# Patient Record
Sex: FEMALE | Race: WHITE | NOT HISPANIC OR LATINO | Employment: PART TIME | URBAN - METROPOLITAN AREA
[De-identification: names, ages, dates, MRNs, and addresses within clinical notes are randomized per-mention and may not be internally consistent; named-entity substitution may affect disease eponyms.]

---

## 2018-01-22 ENCOUNTER — OFFICE VISIT CONVERTED (OUTPATIENT)
Dept: CARDIOLOGY | Facility: CLINIC | Age: 73
End: 2018-01-22
Attending: INTERNAL MEDICINE

## 2018-01-22 ENCOUNTER — CONVERSION ENCOUNTER (OUTPATIENT)
Dept: CARDIOLOGY | Facility: CLINIC | Age: 73
End: 2018-01-22

## 2018-07-25 ENCOUNTER — OFFICE VISIT CONVERTED (OUTPATIENT)
Dept: CARDIOLOGY | Facility: CLINIC | Age: 73
End: 2018-07-25
Attending: INTERNAL MEDICINE

## 2019-02-21 ENCOUNTER — HOSPITAL ENCOUNTER (OUTPATIENT)
Dept: OTHER | Facility: HOSPITAL | Age: 74
Discharge: HOME | End: 2019-02-21
Attending: INTERNAL MEDICINE

## 2019-02-21 LAB — ERYTHROCYTE [SEDIMENTATION RATE] IN BLOOD: 6 MM/H (ref 0–30)

## 2019-02-24 LAB
C3 SERPL-MCNC: 153 MG/DL (ref 88–201)
C4 SERPL-MCNC: 24 MG/DL (ref 10–40)
CONV ANA IGG BY ELISA: NORMAL
RHEUMATOID FACT SERPL-ACNC: <10 IU/ML (ref 0–14)

## 2019-05-08 ENCOUNTER — OFFICE VISIT CONVERTED (OUTPATIENT)
Dept: CARDIOLOGY | Facility: CLINIC | Age: 74
End: 2019-05-08
Attending: INTERNAL MEDICINE

## 2019-05-22 ENCOUNTER — CONVERSION ENCOUNTER (OUTPATIENT)
Dept: CARDIOLOGY | Facility: CLINIC | Age: 74
End: 2019-05-22
Attending: INTERNAL MEDICINE

## 2019-11-11 ENCOUNTER — OFFICE VISIT CONVERTED (OUTPATIENT)
Dept: CARDIOLOGY | Facility: CLINIC | Age: 74
End: 2019-11-11
Attending: NURSE PRACTITIONER

## 2019-11-11 ENCOUNTER — CONVERSION ENCOUNTER (OUTPATIENT)
Dept: CARDIOLOGY | Facility: CLINIC | Age: 74
End: 2019-11-11

## 2020-04-06 ENCOUNTER — HOSPITAL ENCOUNTER (OUTPATIENT)
Dept: ULTRASOUND IMAGING | Facility: HOSPITAL | Age: 75
Discharge: HOME | End: 2020-04-06
Attending: NURSE PRACTITIONER

## 2020-04-09 ENCOUNTER — OFFICE VISIT CONVERTED (OUTPATIENT)
Dept: ONCOLOGY | Facility: HOSPITAL | Age: 75
End: 2020-04-09
Attending: INTERNAL MEDICINE

## 2020-04-09 ENCOUNTER — HOSPITAL ENCOUNTER (OUTPATIENT)
Dept: ONCOLOGY | Facility: HOSPITAL | Age: 75
Discharge: HOME | End: 2020-04-09
Attending: INTERNAL MEDICINE

## 2020-04-09 ENCOUNTER — OFFICE VISIT CONVERTED (OUTPATIENT)
Dept: OTHER | Facility: HOSPITAL | Age: 75
End: 2020-04-09
Attending: SURGERY

## 2020-04-13 LAB — ERBB2 GENE DP TISS QL FISH: NORMAL

## 2020-04-15 ENCOUNTER — HOSPITAL ENCOUNTER (OUTPATIENT)
Dept: MRI IMAGING | Facility: HOSPITAL | Age: 75
Discharge: HOME | End: 2020-04-15
Attending: SURGERY

## 2020-04-15 LAB
CREAT BLD-MCNC: 0.7 MG/DL (ref 0.6–1.4)
GFR SERPLBLD BASED ON 1.73 SQ M-ARVRAT: >60 ML/MIN/{1.73_M2}

## 2020-04-16 ENCOUNTER — OFFICE VISIT CONVERTED (OUTPATIENT)
Dept: ONCOLOGY | Facility: HOSPITAL | Age: 75
End: 2020-04-16
Attending: INTERNAL MEDICINE

## 2020-04-21 ENCOUNTER — HOSPITAL ENCOUNTER (OUTPATIENT)
Dept: PERIOP | Facility: HOSPITAL | Age: 75
Setting detail: HOSPITAL OUTPATIENT SURGERY
Discharge: HOME | End: 2020-04-21
Attending: SURGERY

## 2020-04-29 ENCOUNTER — OFFICE VISIT CONVERTED (OUTPATIENT)
Dept: ONCOLOGY | Facility: HOSPITAL | Age: 75
End: 2020-04-29
Attending: NURSE PRACTITIONER

## 2020-04-30 ENCOUNTER — OFFICE VISIT CONVERTED (OUTPATIENT)
Dept: ONCOLOGY | Facility: HOSPITAL | Age: 75
End: 2020-04-30
Attending: INTERNAL MEDICINE

## 2020-04-30 ENCOUNTER — HOSPITAL ENCOUNTER (OUTPATIENT)
Dept: ONCOLOGY | Facility: HOSPITAL | Age: 75
Discharge: HOME | End: 2020-04-30
Attending: INTERNAL MEDICINE

## 2020-04-30 ENCOUNTER — OFFICE VISIT CONVERTED (OUTPATIENT)
Dept: SURGERY | Facility: CLINIC | Age: 75
End: 2020-04-30
Attending: SURGERY

## 2020-04-30 ENCOUNTER — CONVERSION ENCOUNTER (OUTPATIENT)
Dept: SURGERY | Facility: CLINIC | Age: 75
End: 2020-04-30

## 2020-04-30 LAB
ALBUMIN SERPL-MCNC: 4 G/DL (ref 3.5–5)
ALBUMIN/GLOB SERPL: 1.4 {RATIO} (ref 1.4–2.6)
ALP SERPL-CCNC: 59 U/L (ref 43–160)
ALT SERPL-CCNC: 13 U/L (ref 10–40)
ANION GAP SERPL CALC-SCNC: 10 MMOL/L (ref 8–19)
AST SERPL-CCNC: 18 U/L (ref 15–50)
BASOPHILS # BLD AUTO: 0.03 10*3/UL (ref 0–0.2)
BASOPHILS NFR BLD AUTO: 0.6 % (ref 0–3)
BILIRUB SERPL-MCNC: 0.43 MG/DL (ref 0.2–1.3)
BUN SERPL-MCNC: 17 MG/DL (ref 5–25)
BUN/CREAT SERPL: 27 {RATIO} (ref 6–20)
CALCIUM SERPL-MCNC: 10.1 MG/DL (ref 8.7–10.4)
CALCIUM SPEC-SCNC: 10.1 MG/DL (ref 8.7–10.4)
CHLORIDE SERPL-SCNC: 102 MMOL/L (ref 99–111)
CONV ABS IMM GRAN: 0.01 10*3/UL (ref 0–0.54)
CONV CO2: 30 MMOL/L (ref 22–32)
CONV EOSINOPHILS PERCENT BY MANUAL COUNT: 2.4 % (ref 0–7)
CONV IMMATURE GRAN: 0.2 % (ref 0–0.4)
CONV TOTAL PROTEIN: 6.8 G/DL (ref 6.3–8.2)
CREAT UR-MCNC: 0.64 MG/DL (ref 0.5–0.9)
EOSINOPHIL # BLD MANUAL: 0.12 10*3/UL (ref 0–0.7)
ERYTHROCYTE [DISTWIDTH] IN BLOOD BY AUTOMATED COUNT: 12.4 % (ref 11.5–14.5)
ERYTHROCYTE [DISTWIDTH] IN BLOOD BY AUTOMATED COUNT: 44.1 FL
GFR SERPLBLD BASED ON 1.73 SQ M-ARVRAT: >60 ML/MIN/{1.73_M2}
GLOBULIN UR ELPH-MCNC: 2.8 G/DL (ref 2–3.5)
GLUCOSE SERPL-MCNC: 99 MG/DL (ref 65–99)
HBA1C MFR BLD: 13.2 G/DL (ref 12–16)
HCT VFR BLD AUTO: 40.5 % (ref 37–47)
LYMPHOCYTES # BLD AUTO: 1.45 10*3/UL (ref 1–5)
LYMPHOCYTES NFR BLD AUTO: 28.9 % (ref 20–45)
MCH RBC QN AUTO: 31.2 PG (ref 27–31)
MCHC RBC AUTO-ENTMCNC: 32.6 G/DL (ref 33–37)
MCV RBC AUTO: 95.7 FL (ref 81–99)
MONOCYTES # BLD AUTO: 0.44 10*3/UL (ref 0.2–1.2)
MONOCYTES NFR BLD MANUAL: 8.8 % (ref 3–10)
NEUTROPHILS # BLD AUTO: 2.97 10*3/UL (ref 2–8)
NEUTROPHILS NFR BLD MANUAL: 59.1 % (ref 30–85)
OSMOLALITY SERPL CALC.SUM OF ELEC: 288 MOSM/KG (ref 273–304)
PLATELET # BLD AUTO: 223 10*3/UL (ref 130–400)
PMV BLD AUTO: 9.3 FL (ref 7.4–10.4)
POTASSIUM SERPL-SCNC: 3.9 MMOL/L (ref 3.5–5.3)
RBC MORPH BLD: 4.23 10*6/UL (ref 4.2–5.4)
SODIUM SERPL-SCNC: 138 MMOL/L (ref 135–147)
WBC # BLD AUTO: 5.02 10*3/UL (ref 4.8–10.8)

## 2020-05-01 ENCOUNTER — HOSPITAL ENCOUNTER (OUTPATIENT)
Dept: OTHER | Facility: HOSPITAL | Age: 75
Setting detail: RECURRING SERIES
Discharge: STILL ADMITTED OR ADMINISTRATIVE | End: 2020-07-10
Attending: INTERNAL MEDICINE

## 2020-05-18 ENCOUNTER — HOSPITAL ENCOUNTER (OUTPATIENT)
Dept: CARDIOLOGY | Facility: HOSPITAL | Age: 75
Discharge: HOME | End: 2020-05-18
Attending: INTERNAL MEDICINE

## 2020-05-22 ENCOUNTER — OFFICE VISIT CONVERTED (OUTPATIENT)
Dept: ONCOLOGY | Facility: HOSPITAL | Age: 75
End: 2020-05-22
Attending: INTERNAL MEDICINE

## 2020-05-22 LAB
ALBUMIN SERPL-MCNC: 3.7 G/DL (ref 3.5–5)
ALBUMIN/GLOB SERPL: 1.7 {RATIO} (ref 1.4–2.6)
ALP SERPL-CCNC: 54 U/L (ref 43–160)
ALT SERPL-CCNC: 28 U/L (ref 10–40)
ANION GAP SERPL CALC-SCNC: 10 MMOL/L (ref 8–19)
AST SERPL-CCNC: 23 U/L (ref 15–50)
BASOPHILS # BLD AUTO: 0.05 10*3/UL (ref 0–0.2)
BASOPHILS NFR BLD AUTO: 1.1 % (ref 0–3)
BILIRUB SERPL-MCNC: 0.52 MG/DL (ref 0.2–1.3)
BUN SERPL-MCNC: 13 MG/DL (ref 5–25)
BUN/CREAT SERPL: 20 {RATIO} (ref 6–20)
CALCIUM SERPL-MCNC: 9.9 MG/DL (ref 8.7–10.4)
CALCIUM SPEC-SCNC: 9.7 MG/DL (ref 8.7–10.4)
CHLORIDE SERPL-SCNC: 100 MMOL/L (ref 99–111)
CONV ABS IMM GRAN: 0.02 10*3/UL (ref 0–0.54)
CONV CO2: 30 MMOL/L (ref 22–32)
CONV EOSINOPHILS PERCENT BY MANUAL COUNT: 1.5 % (ref 0–7)
CONV IMMATURE GRAN: 0.4 % (ref 0–0.4)
CONV TOTAL PROTEIN: 5.9 G/DL (ref 6.3–8.2)
CREAT UR-MCNC: 0.65 MG/DL (ref 0.5–0.9)
EOSINOPHIL # BLD MANUAL: 0.07 10*3/UL (ref 0–0.7)
ERYTHROCYTE [DISTWIDTH] IN BLOOD BY AUTOMATED COUNT: 13.9 % (ref 11.5–14.5)
ERYTHROCYTE [DISTWIDTH] IN BLOOD BY AUTOMATED COUNT: 47.6 FL
GFR SERPLBLD BASED ON 1.73 SQ M-ARVRAT: >60 ML/MIN/{1.73_M2}
GLOBULIN UR ELPH-MCNC: 2.2 G/DL (ref 2–3.5)
GLUCOSE SERPL-MCNC: 101 MG/DL (ref 65–99)
HBA1C MFR BLD: 10.8 G/DL (ref 12–16)
HCT VFR BLD AUTO: 32.7 % (ref 37–47)
LYMPHOCYTES # BLD AUTO: 0.94 10*3/UL (ref 1–5)
LYMPHOCYTES NFR BLD AUTO: 20.6 % (ref 20–45)
MCH RBC QN AUTO: 31.7 PG (ref 27–31)
MCHC RBC AUTO-ENTMCNC: 33 G/DL (ref 33–37)
MCV RBC AUTO: 95.9 FL (ref 81–99)
MONOCYTES # BLD AUTO: 0.47 10*3/UL (ref 0.2–1.2)
MONOCYTES NFR BLD MANUAL: 10.3 % (ref 3–10)
NEUTROPHILS # BLD AUTO: 3.02 10*3/UL (ref 2–8)
NEUTROPHILS NFR BLD MANUAL: 66.1 % (ref 30–85)
OSMOLALITY SERPL CALC.SUM OF ELEC: 284 MOSM/KG (ref 273–304)
PLATELET # BLD AUTO: 245 10*3/UL (ref 130–400)
PMV BLD AUTO: 9.1 FL (ref 7.4–10.4)
POTASSIUM SERPL-SCNC: 3.4 MMOL/L (ref 3.5–5.3)
RBC MORPH BLD: 3.41 10*6/UL (ref 4.2–5.4)
SODIUM SERPL-SCNC: 137 MMOL/L (ref 135–147)
WBC # BLD AUTO: 4.57 10*3/UL (ref 4.8–10.8)

## 2020-05-27 ENCOUNTER — OFFICE VISIT CONVERTED (OUTPATIENT)
Dept: CARDIOLOGY | Facility: CLINIC | Age: 75
End: 2020-05-27
Attending: INTERNAL MEDICINE

## 2020-06-12 ENCOUNTER — OFFICE VISIT CONVERTED (OUTPATIENT)
Dept: ONCOLOGY | Facility: HOSPITAL | Age: 75
End: 2020-06-12
Attending: INTERNAL MEDICINE

## 2020-06-12 ENCOUNTER — HOSPITAL ENCOUNTER (OUTPATIENT)
Dept: OTHER | Facility: HOSPITAL | Age: 75
Discharge: HOME | End: 2020-06-12
Attending: INTERNAL MEDICINE

## 2020-06-12 LAB
ALBUMIN SERPL-MCNC: 3.9 G/DL (ref 3.5–5)
ALBUMIN/GLOB SERPL: 2 {RATIO} (ref 1.4–2.6)
ALP SERPL-CCNC: 59 U/L (ref 43–160)
ALT SERPL-CCNC: 23 U/L (ref 10–40)
ANION GAP SERPL CALC-SCNC: 8 MMOL/L (ref 8–19)
AST SERPL-CCNC: 23 U/L (ref 15–50)
BASOPHILS # BLD AUTO: 0.03 10*3/UL (ref 0–0.2)
BASOPHILS NFR BLD AUTO: 0.6 % (ref 0–3)
BILIRUB SERPL-MCNC: 0.55 MG/DL (ref 0.2–1.3)
BNP SERPL-MCNC: 703 PG/ML (ref 0–900)
BUN SERPL-MCNC: 17 MG/DL (ref 5–25)
BUN/CREAT SERPL: 27 {RATIO} (ref 6–20)
CALCIUM SERPL-MCNC: 9.9 MG/DL (ref 8.7–10.4)
CALCIUM SPEC-SCNC: 9.8 MG/DL (ref 8.7–10.4)
CHLORIDE SERPL-SCNC: 99 MMOL/L (ref 99–111)
CONV ABS IMM GRAN: 0.01 10*3/UL (ref 0–0.54)
CONV CO2: 29 MMOL/L (ref 22–32)
CONV EOSINOPHILS PERCENT BY MANUAL COUNT: 0.2 % (ref 0–7)
CONV IMMATURE GRAN: 0.2 % (ref 0–0.4)
CONV TOTAL PROTEIN: 5.9 G/DL (ref 6.3–8.2)
CREAT UR-MCNC: 0.62 MG/DL (ref 0.5–0.9)
EOSINOPHIL # BLD MANUAL: 0.01 10*3/UL (ref 0–0.7)
ERYTHROCYTE [DISTWIDTH] IN BLOOD BY AUTOMATED COUNT: 15.8 % (ref 11.5–14.5)
ERYTHROCYTE [DISTWIDTH] IN BLOOD BY AUTOMATED COUNT: 56.4 FL
GFR SERPLBLD BASED ON 1.73 SQ M-ARVRAT: >60 ML/MIN/{1.73_M2}
GLOBULIN UR ELPH-MCNC: 2 G/DL (ref 2–3.5)
GLUCOSE SERPL-MCNC: 105 MG/DL (ref 65–99)
HBA1C MFR BLD: 10.1 G/DL (ref 12–16)
HCT VFR BLD AUTO: 31 % (ref 37–47)
LYMPHOCYTES # BLD AUTO: 0.87 10*3/UL (ref 1–5)
LYMPHOCYTES NFR BLD AUTO: 18.4 % (ref 20–45)
MCH RBC QN AUTO: 32.2 PG (ref 27–31)
MCHC RBC AUTO-ENTMCNC: 32.6 G/DL (ref 33–37)
MCV RBC AUTO: 98.7 FL (ref 81–99)
MONOCYTES # BLD AUTO: 0.61 10*3/UL (ref 0.2–1.2)
MONOCYTES NFR BLD MANUAL: 12.9 % (ref 3–10)
NEUTROPHILS # BLD AUTO: 3.21 10*3/UL (ref 2–8)
NEUTROPHILS NFR BLD MANUAL: 67.7 % (ref 30–85)
OSMOLALITY SERPL CALC.SUM OF ELEC: 278 MOSM/KG (ref 273–304)
PLATELET # BLD AUTO: 209 10*3/UL (ref 130–400)
PMV BLD AUTO: 9.2 FL (ref 7.4–10.4)
POTASSIUM SERPL-SCNC: 3.2 MMOL/L (ref 3.5–5.3)
RBC MORPH BLD: 3.14 10*6/UL (ref 4.2–5.4)
SODIUM SERPL-SCNC: 133 MMOL/L (ref 135–147)
WBC # BLD AUTO: 4.74 10*3/UL (ref 4.8–10.8)

## 2020-07-02 ENCOUNTER — OFFICE VISIT CONVERTED (OUTPATIENT)
Dept: ONCOLOGY | Facility: HOSPITAL | Age: 75
End: 2020-07-02
Attending: INTERNAL MEDICINE

## 2020-07-02 LAB
ALBUMIN SERPL-MCNC: 3.7 G/DL (ref 3.5–5)
ALBUMIN/GLOB SERPL: 1.9 {RATIO} (ref 1.4–2.6)
ALP SERPL-CCNC: 62 U/L (ref 43–160)
ALT SERPL-CCNC: 20 U/L (ref 10–40)
ANION GAP SERPL CALC-SCNC: 10 MMOL/L (ref 8–19)
AST SERPL-CCNC: 23 U/L (ref 15–50)
BASOPHILS # BLD AUTO: 0.04 10*3/UL (ref 0–0.2)
BASOPHILS NFR BLD AUTO: 1 % (ref 0–3)
BILIRUB SERPL-MCNC: 0.53 MG/DL (ref 0.2–1.3)
BUN SERPL-MCNC: 13 MG/DL (ref 5–25)
BUN/CREAT SERPL: 25 {RATIO} (ref 6–20)
CALCIUM SERPL-MCNC: 9.8 MG/DL (ref 8.7–10.4)
CALCIUM SPEC-SCNC: 9.6 MG/DL (ref 8.7–10.4)
CHLORIDE SERPL-SCNC: 98 MMOL/L (ref 99–111)
CONV ABS IMM GRAN: 0.01 10*3/UL (ref 0–0.54)
CONV CO2: 27 MMOL/L (ref 22–32)
CONV EOSINOPHILS PERCENT BY MANUAL COUNT: 0 % (ref 0–7)
CONV IMMATURE GRAN: 0.2 % (ref 0–0.4)
CONV TOTAL PROTEIN: 5.7 G/DL (ref 6.3–8.2)
CREAT UR-MCNC: 0.53 MG/DL (ref 0.5–0.9)
EOSINOPHIL # BLD MANUAL: 0 10*3/UL (ref 0–0.7)
ERYTHROCYTE [DISTWIDTH] IN BLOOD BY AUTOMATED COUNT: 16.9 % (ref 11.5–14.5)
ERYTHROCYTE [DISTWIDTH] IN BLOOD BY AUTOMATED COUNT: 61.2 FL
GFR SERPLBLD BASED ON 1.73 SQ M-ARVRAT: >60 ML/MIN/{1.73_M2}
GLOBULIN UR ELPH-MCNC: 2 G/DL (ref 2–3.5)
GLUCOSE SERPL-MCNC: 102 MG/DL (ref 65–99)
HBA1C MFR BLD: 9.4 G/DL (ref 12–16)
HCT VFR BLD AUTO: 29.1 % (ref 37–47)
LYMPHOCYTES # BLD AUTO: 1.01 10*3/UL (ref 1–5)
LYMPHOCYTES NFR BLD AUTO: 24 % (ref 20–45)
MCH RBC QN AUTO: 32.3 PG (ref 27–31)
MCHC RBC AUTO-ENTMCNC: 32.3 G/DL (ref 33–37)
MCV RBC AUTO: 100 FL (ref 81–99)
MONOCYTES # BLD AUTO: 0.59 10*3/UL (ref 0.2–1.2)
MONOCYTES NFR BLD MANUAL: 14 % (ref 3–10)
NEUTROPHILS # BLD AUTO: 2.56 10*3/UL (ref 2–8)
NEUTROPHILS NFR BLD MANUAL: 60.8 % (ref 30–85)
OSMOLALITY SERPL CALC.SUM OF ELEC: 274 MOSM/KG (ref 273–304)
PLATELET # BLD AUTO: 195 10*3/UL (ref 130–400)
PMV BLD AUTO: 9.3 FL (ref 7.4–10.4)
POTASSIUM SERPL-SCNC: 3.4 MMOL/L (ref 3.5–5.3)
RBC MORPH BLD: 2.91 10*6/UL (ref 4.2–5.4)
SODIUM SERPL-SCNC: 132 MMOL/L (ref 135–147)
WBC # BLD AUTO: 4.21 10*3/UL (ref 4.8–10.8)

## 2020-07-24 ENCOUNTER — HOSPITAL ENCOUNTER (OUTPATIENT)
Dept: OTHER | Facility: HOSPITAL | Age: 75
Discharge: HOME | End: 2020-07-24
Attending: INTERNAL MEDICINE

## 2020-07-24 ENCOUNTER — HOSPITAL ENCOUNTER (OUTPATIENT)
Dept: OTHER | Facility: HOSPITAL | Age: 75
Setting detail: RECURRING SERIES
Discharge: STILL ADMITTED OR ADMINISTRATIVE | End: 2020-07-28
Attending: INTERNAL MEDICINE

## 2020-07-24 ENCOUNTER — OFFICE VISIT CONVERTED (OUTPATIENT)
Dept: ONCOLOGY | Facility: HOSPITAL | Age: 75
End: 2020-07-24
Attending: INTERNAL MEDICINE

## 2020-07-24 LAB
ALBUMIN SERPL-MCNC: 3.5 G/DL (ref 3.5–5)
ALBUMIN/GLOB SERPL: 2.2 {RATIO} (ref 1.4–2.6)
ALP SERPL-CCNC: 62 U/L (ref 43–160)
ALT SERPL-CCNC: 17 U/L (ref 10–40)
ANION GAP SERPL CALC-SCNC: 12 MMOL/L (ref 8–19)
AST SERPL-CCNC: 23 U/L (ref 15–50)
BASOPHILS # BLD AUTO: 0.05 10*3/UL (ref 0–0.2)
BASOPHILS NFR BLD AUTO: 1.2 % (ref 0–3)
BILIRUB SERPL-MCNC: 0.45 MG/DL (ref 0.2–1.3)
BUN SERPL-MCNC: 28 MG/DL (ref 5–25)
BUN/CREAT SERPL: 30 {RATIO} (ref 6–20)
CALCIUM SERPL-MCNC: 9.8 MG/DL (ref 8.7–10.4)
CHLORIDE SERPL-SCNC: 97 MMOL/L (ref 99–111)
CONV ABS IMM GRAN: 0.01 10*3/UL (ref 0–0.54)
CONV CO2: 26 MMOL/L (ref 22–32)
CONV EOSINOPHILS PERCENT BY MANUAL COUNT: 0.2 % (ref 0–7)
CONV IMMATURE GRAN: 0.2 % (ref 0–0.4)
CONV TOTAL PROTEIN: 5.1 G/DL (ref 6.3–8.2)
CREAT UR-MCNC: 0.93 MG/DL (ref 0.5–0.9)
EOSINOPHIL # BLD MANUAL: 0.01 10*3/UL (ref 0–0.7)
ERYTHROCYTE [DISTWIDTH] IN BLOOD BY AUTOMATED COUNT: 17.6 % (ref 11.5–14.5)
ERYTHROCYTE [DISTWIDTH] IN BLOOD BY AUTOMATED COUNT: 66.1 FL
GFR SERPLBLD BASED ON 1.73 SQ M-ARVRAT: >60 ML/MIN/{1.73_M2}
GLOBULIN UR ELPH-MCNC: 1.6 G/DL (ref 2–3.5)
GLUCOSE SERPL-MCNC: 102 MG/DL (ref 65–99)
HBA1C MFR BLD: 9.2 G/DL (ref 12–16)
HCT VFR BLD AUTO: 29.3 % (ref 37–47)
LYMPHOCYTES # BLD AUTO: 1.22 10*3/UL (ref 1–5)
LYMPHOCYTES NFR BLD AUTO: 28.2 % (ref 20–45)
MCH RBC QN AUTO: 32.1 PG (ref 27–31)
MCHC RBC AUTO-ENTMCNC: 31.4 G/DL (ref 33–37)
MCV RBC AUTO: 102.1 FL (ref 81–99)
MONOCYTES # BLD AUTO: 0.55 10*3/UL (ref 0.2–1.2)
MONOCYTES NFR BLD MANUAL: 12.7 % (ref 3–10)
NEUTROPHILS # BLD AUTO: 2.49 10*3/UL (ref 2–8)
NEUTROPHILS NFR BLD MANUAL: 57.5 % (ref 30–85)
OSMOLALITY SERPL CALC.SUM OF ELEC: 278 MOSM/KG (ref 273–304)
PLATELET # BLD AUTO: 185 10*3/UL (ref 130–400)
PMV BLD AUTO: 9.7 FL (ref 7.4–10.4)
POTASSIUM SERPL-SCNC: 4.1 MMOL/L (ref 3.5–5.3)
RBC MORPH BLD: 2.87 10*6/UL (ref 4.2–5.4)
SODIUM SERPL-SCNC: 131 MMOL/L (ref 135–147)
WBC # BLD AUTO: 4.33 10*3/UL (ref 4.8–10.8)

## 2020-07-29 ENCOUNTER — HOSPITAL ENCOUNTER (OUTPATIENT)
Dept: MRI IMAGING | Facility: HOSPITAL | Age: 75
Discharge: HOME | End: 2020-07-29
Attending: INTERNAL MEDICINE

## 2020-07-31 ENCOUNTER — HOSPITAL ENCOUNTER (OUTPATIENT)
Dept: OTHER | Facility: HOSPITAL | Age: 75
Discharge: HOME | End: 2020-07-31
Attending: INTERNAL MEDICINE

## 2020-07-31 ENCOUNTER — OFFICE VISIT CONVERTED (OUTPATIENT)
Dept: ONCOLOGY | Facility: HOSPITAL | Age: 75
End: 2020-07-31
Attending: INTERNAL MEDICINE

## 2020-08-05 ENCOUNTER — OFFICE VISIT CONVERTED (OUTPATIENT)
Dept: CARDIOLOGY | Facility: CLINIC | Age: 75
End: 2020-08-05
Attending: INTERNAL MEDICINE

## 2020-08-13 ENCOUNTER — OFFICE VISIT CONVERTED (OUTPATIENT)
Dept: SURGERY | Facility: CLINIC | Age: 75
End: 2020-08-13
Attending: SURGERY

## 2020-08-13 ENCOUNTER — HOSPITAL ENCOUNTER (OUTPATIENT)
Dept: CARDIOLOGY | Facility: HOSPITAL | Age: 75
Discharge: HOME | End: 2020-08-13
Attending: INTERNAL MEDICINE

## 2020-08-13 ENCOUNTER — HOSPITAL ENCOUNTER (OUTPATIENT)
Dept: PREADMISSION TESTING | Facility: HOSPITAL | Age: 75
Discharge: HOME | End: 2020-08-13
Attending: SURGERY

## 2020-08-15 LAB — SARS-COV-2 RNA SPEC QL NAA+PROBE: NOT DETECTED

## 2020-08-18 ENCOUNTER — HOSPITAL ENCOUNTER (OUTPATIENT)
Dept: PERIOP | Facility: HOSPITAL | Age: 75
Setting detail: HOSPITAL OUTPATIENT SURGERY
Discharge: HOME | End: 2020-08-18
Attending: SURGERY

## 2020-08-26 ENCOUNTER — OFFICE VISIT CONVERTED (OUTPATIENT)
Dept: SURGERY | Facility: CLINIC | Age: 75
End: 2020-08-26
Attending: SURGERY

## 2020-09-08 ENCOUNTER — HOSPITAL ENCOUNTER (OUTPATIENT)
Dept: PHYSICAL THERAPY | Facility: CLINIC | Age: 75
Setting detail: RECURRING SERIES
Discharge: STILL ADMITTED OR ADMINISTRATIVE | End: 2021-01-10
Attending: SURGERY

## 2020-09-10 ENCOUNTER — HOSPITAL ENCOUNTER (OUTPATIENT)
Dept: RADIATION ONCOLOGY | Facility: HOSPITAL | Age: 75
Setting detail: RECURRING SERIES
Discharge: STILL ADMITTED OR ADMINISTRATIVE | End: 2020-12-02
Attending: RADIOLOGY

## 2020-09-24 ENCOUNTER — OFFICE VISIT CONVERTED (OUTPATIENT)
Dept: RADIATION ONCOLOGY | Facility: HOSPITAL | Age: 75
End: 2020-09-24
Attending: INTERNAL MEDICINE

## 2020-09-24 ENCOUNTER — HOSPITAL ENCOUNTER (OUTPATIENT)
Dept: OTHER | Facility: HOSPITAL | Age: 75
Discharge: HOME | End: 2020-09-24
Attending: INTERNAL MEDICINE

## 2020-09-28 ENCOUNTER — OFFICE VISIT CONVERTED (OUTPATIENT)
Dept: CARDIOLOGY | Facility: CLINIC | Age: 75
End: 2020-09-28
Attending: INTERNAL MEDICINE

## 2020-09-28 ENCOUNTER — CONVERSION ENCOUNTER (OUTPATIENT)
Dept: CARDIOLOGY | Facility: CLINIC | Age: 75
End: 2020-09-28

## 2020-09-29 ENCOUNTER — HOSPITAL ENCOUNTER (OUTPATIENT)
Dept: OTHER | Facility: HOSPITAL | Age: 75
Setting detail: RECURRING SERIES
Discharge: HOME | End: 2020-12-28
Attending: INTERNAL MEDICINE

## 2020-09-29 LAB
ALBUMIN SERPL-MCNC: 3.9 G/DL (ref 3.5–5)
ALBUMIN/GLOB SERPL: 1.9 {RATIO} (ref 1.4–2.6)
ALP SERPL-CCNC: 69 U/L (ref 43–160)
ALT SERPL-CCNC: 6 U/L (ref 10–40)
ANION GAP SERPL CALC-SCNC: 11 MMOL/L (ref 8–19)
AST SERPL-CCNC: 14 U/L (ref 15–50)
BASOPHILS # BLD AUTO: 0.02 10*3/UL (ref 0–0.2)
BASOPHILS NFR BLD AUTO: 0.7 % (ref 0–3)
BILIRUB SERPL-MCNC: 0.76 MG/DL (ref 0.2–1.3)
BUN SERPL-MCNC: 12 MG/DL (ref 5–25)
BUN/CREAT SERPL: 18 {RATIO} (ref 6–20)
CALCIUM SERPL-MCNC: 9.8 MG/DL (ref 8.7–10.4)
CHLORIDE SERPL-SCNC: 103 MMOL/L (ref 99–111)
CONV ABS IMM GRAN: 0.01 10*3/UL (ref 0–0.54)
CONV CO2: 29 MMOL/L (ref 22–32)
CONV EOSINOPHILS PERCENT BY MANUAL COUNT: 3.9 % (ref 0–7)
CONV IMMATURE GRAN: 0.3 % (ref 0–0.4)
CONV TOTAL PROTEIN: 6 G/DL (ref 6.3–8.2)
CREAT UR-MCNC: 0.65 MG/DL (ref 0.5–0.9)
EOSINOPHIL # BLD MANUAL: 0.12 10*3/UL (ref 0–0.7)
ERYTHROCYTE [DISTWIDTH] IN BLOOD BY AUTOMATED COUNT: 13.7 % (ref 11.5–14.5)
ERYTHROCYTE [DISTWIDTH] IN BLOOD BY AUTOMATED COUNT: 53 FL
GFR SERPLBLD BASED ON 1.73 SQ M-ARVRAT: >60 ML/MIN/{1.73_M2}
GLOBULIN UR ELPH-MCNC: 2.1 G/DL (ref 2–3.5)
GLUCOSE SERPL-MCNC: 95 MG/DL (ref 65–99)
HBA1C MFR BLD: 9.9 G/DL (ref 12–16)
HCT VFR BLD AUTO: 31.6 % (ref 37–47)
LYMPHOCYTES # BLD AUTO: 0.85 10*3/UL (ref 1–5)
LYMPHOCYTES NFR BLD AUTO: 27.8 % (ref 20–45)
MCH RBC QN AUTO: 32.7 PG (ref 27–31)
MCHC RBC AUTO-ENTMCNC: 31.3 G/DL (ref 33–37)
MCV RBC AUTO: 104.3 FL (ref 81–99)
MONOCYTES # BLD AUTO: 0.29 10*3/UL (ref 0.2–1.2)
MONOCYTES NFR BLD MANUAL: 9.5 % (ref 3–10)
NEUTROPHILS # BLD AUTO: 1.77 10*3/UL (ref 2–8)
NEUTROPHILS NFR BLD MANUAL: 57.8 % (ref 30–85)
OSMOLALITY SERPL CALC.SUM OF ELEC: 288 MOSM/KG (ref 273–304)
PATHOLOGY REVIEW: NORMAL
PLATELET # BLD AUTO: 135 10*3/UL (ref 130–400)
PMV BLD AUTO: 9.5 FL (ref 7.4–10.4)
POTASSIUM SERPL-SCNC: 3.8 MMOL/L (ref 3.5–5.3)
RBC MORPH BLD: 3.03 10*6/UL (ref 4.2–5.4)
SODIUM SERPL-SCNC: 139 MMOL/L (ref 135–147)
WBC # BLD AUTO: 3.06 10*3/UL (ref 4.8–10.8)

## 2020-10-20 ENCOUNTER — OFFICE VISIT CONVERTED (OUTPATIENT)
Dept: RADIATION ONCOLOGY | Facility: HOSPITAL | Age: 75
End: 2020-10-20
Attending: INTERNAL MEDICINE

## 2020-10-20 LAB
ALBUMIN SERPL-MCNC: 3.7 G/DL (ref 3.5–5)
ALBUMIN/GLOB SERPL: 1.6 {RATIO} (ref 1.4–2.6)
ALP SERPL-CCNC: 89 U/L (ref 43–160)
ALT SERPL-CCNC: 7 U/L (ref 10–40)
ANION GAP SERPL CALC-SCNC: 8 MMOL/L (ref 8–19)
AST SERPL-CCNC: 19 U/L (ref 15–50)
BASOPHILS # BLD AUTO: 0.02 10*3/UL (ref 0–0.2)
BASOPHILS NFR BLD AUTO: 0.7 % (ref 0–3)
BILIRUB SERPL-MCNC: 0.81 MG/DL (ref 0.2–1.3)
BUN SERPL-MCNC: 16 MG/DL (ref 5–25)
BUN/CREAT SERPL: 22 {RATIO} (ref 6–20)
CALCIUM SERPL-MCNC: 9.7 MG/DL (ref 8.7–10.4)
CHLORIDE SERPL-SCNC: 101 MMOL/L (ref 99–111)
CONV ABS IMM GRAN: 0 10*3/UL (ref 0–0.54)
CONV CO2: 30 MMOL/L (ref 22–32)
CONV EOSINOPHILS PERCENT BY MANUAL COUNT: 8.6 % (ref 0–7)
CONV IMMATURE GRAN: 0 % (ref 0–0.4)
CONV TOTAL PROTEIN: 6 G/DL (ref 6.3–8.2)
CREAT UR-MCNC: 0.72 MG/DL (ref 0.5–0.9)
EOSINOPHIL # BLD MANUAL: 0.26 10*3/UL (ref 0–0.7)
ERYTHROCYTE [DISTWIDTH] IN BLOOD BY AUTOMATED COUNT: 14.5 % (ref 11.5–14.5)
ERYTHROCYTE [DISTWIDTH] IN BLOOD BY AUTOMATED COUNT: 55 FL
GFR SERPLBLD BASED ON 1.73 SQ M-ARVRAT: >60 ML/MIN/{1.73_M2}
GLOBULIN UR ELPH-MCNC: 2.3 G/DL (ref 2–3.5)
GLUCOSE SERPL-MCNC: 120 MG/DL (ref 65–99)
HBA1C MFR BLD: 10.2 G/DL (ref 12–16)
HCT VFR BLD AUTO: 32.7 % (ref 37–47)
LYMPHOCYTES # BLD AUTO: 0.62 10*3/UL (ref 1–5)
LYMPHOCYTES NFR BLD AUTO: 20.6 % (ref 20–45)
MCH RBC QN AUTO: 32.2 PG (ref 27–31)
MCHC RBC AUTO-ENTMCNC: 31.2 G/DL (ref 33–37)
MCV RBC AUTO: 103.2 FL (ref 81–99)
MONOCYTES # BLD AUTO: 0.3 10*3/UL (ref 0.2–1.2)
MONOCYTES NFR BLD MANUAL: 10 % (ref 3–10)
NEUTROPHILS # BLD AUTO: 1.81 10*3/UL (ref 2–8)
NEUTROPHILS NFR BLD MANUAL: 60.1 % (ref 30–85)
OSMOLALITY SERPL CALC.SUM OF ELEC: 284 MOSM/KG (ref 273–304)
PLATELET # BLD AUTO: 95 10*3/UL (ref 130–400)
PMV BLD AUTO: 9.9 FL (ref 7.4–10.4)
POTASSIUM SERPL-SCNC: 3.1 MMOL/L (ref 3.5–5.3)
RBC MORPH BLD: 3.17 10*6/UL (ref 4.2–5.4)
SODIUM SERPL-SCNC: 136 MMOL/L (ref 135–147)
WBC # BLD AUTO: 3.01 10*3/UL (ref 4.8–10.8)

## 2020-11-10 ENCOUNTER — OFFICE VISIT CONVERTED (OUTPATIENT)
Dept: RADIATION ONCOLOGY | Facility: HOSPITAL | Age: 75
End: 2020-11-10
Attending: INTERNAL MEDICINE

## 2020-11-10 LAB
ALBUMIN SERPL-MCNC: 3.5 G/DL (ref 3.5–5)
ALBUMIN/GLOB SERPL: 1.2 {RATIO} (ref 1.4–2.6)
ALP SERPL-CCNC: 96 U/L (ref 43–160)
ALT SERPL-CCNC: 11 U/L (ref 10–40)
ANION GAP SERPL CALC-SCNC: 13 MMOL/L (ref 8–19)
AST SERPL-CCNC: 28 U/L (ref 15–50)
BASOPHILS # BLD AUTO: 0.03 10*3/UL (ref 0–0.2)
BASOPHILS NFR BLD AUTO: 1.1 % (ref 0–3)
BILIRUB SERPL-MCNC: 1.36 MG/DL (ref 0.2–1.3)
BILIRUB SERPL-MCNC: 1.42 MG/DL (ref 0.2–1.3)
BUN SERPL-MCNC: 14 MG/DL (ref 5–25)
BUN/CREAT SERPL: 17 {RATIO} (ref 6–20)
CALCIUM SERPL-MCNC: 10.7 MG/DL (ref 8.7–10.4)
CHLORIDE SERPL-SCNC: 98 MMOL/L (ref 99–111)
CONV ABS IMM GRAN: 0 10*3/UL (ref 0–0.2)
CONV BILI, CONJUGATED: 0.5 MG/DL (ref 0–0.6)
CONV CO2: 28 MMOL/L (ref 22–32)
CONV IMMATURE GRAN: 0 % (ref 0–1.8)
CONV TOTAL PROTEIN: 6.5 G/DL (ref 6.3–8.2)
CONV UNCONJUGATED BILIRUBIN: 0.9 MG/DL (ref 0–1.1)
CREAT UR-MCNC: 0.83 MG/DL (ref 0.5–0.9)
DEPRECATED RDW RBC AUTO: 54.6 FL (ref 36.4–46.3)
EOSINOPHIL # BLD AUTO: 0.37 10*3/UL (ref 0–0.7)
EOSINOPHIL # BLD AUTO: 13.3 % (ref 0–7)
ERYTHROCYTE [DISTWIDTH] IN BLOOD BY AUTOMATED COUNT: 15.1 % (ref 11.7–14.4)
GFR SERPLBLD BASED ON 1.73 SQ M-ARVRAT: >60 ML/MIN/{1.73_M2}
GLOBULIN UR ELPH-MCNC: 3 G/DL (ref 2–3.5)
GLUCOSE SERPL-MCNC: 88 MG/DL (ref 65–99)
HCT VFR BLD AUTO: 36 % (ref 37–47)
HGB BLD-MCNC: 11.4 G/DL (ref 12–16)
LYMPHOCYTES # BLD AUTO: 0.62 10*3/UL (ref 1–5)
LYMPHOCYTES NFR BLD AUTO: 22.2 % (ref 20–45)
MCH RBC QN AUTO: 31.5 PG (ref 27–31)
MCHC RBC AUTO-ENTMCNC: 31.7 G/DL (ref 33–37)
MCV RBC AUTO: 99.4 FL (ref 81–99)
MONOCYTES # BLD AUTO: 0.4 10*3/UL (ref 0.2–1.2)
MONOCYTES NFR BLD AUTO: 14.3 % (ref 3–10)
NEUTROPHILS # BLD AUTO: 1.37 10*3/UL (ref 2–8)
NEUTROPHILS NFR BLD AUTO: 49.1 % (ref 30–85)
NRBC CBCN: 0 % (ref 0–0.7)
OSMOLALITY SERPL CALC.SUM OF ELEC: 280 MOSM/KG (ref 273–304)
PLATELET # BLD AUTO: 64 10*3/UL (ref 130–400)
PMV BLD AUTO: 11 FL (ref 9.4–12.3)
POTASSIUM SERPL-SCNC: 3.6 MMOL/L (ref 3.5–5.3)
RBC # BLD AUTO: 3.62 10*6/UL (ref 4.2–5.4)
SODIUM SERPL-SCNC: 135 MMOL/L (ref 135–147)
WBC # BLD AUTO: 2.79 10*3/UL (ref 4.8–10.8)

## 2020-11-16 ENCOUNTER — CONVERSION ENCOUNTER (OUTPATIENT)
Dept: CARDIOLOGY | Facility: CLINIC | Age: 75
End: 2020-11-16
Attending: INTERNAL MEDICINE

## 2020-11-17 ENCOUNTER — OFFICE VISIT CONVERTED (OUTPATIENT)
Dept: RADIATION ONCOLOGY | Facility: HOSPITAL | Age: 75
End: 2020-11-17
Attending: NURSE PRACTITIONER

## 2020-11-17 LAB
ALBUMIN SERPL-MCNC: 3.4 G/DL (ref 3.5–5)
ALBUMIN/GLOB SERPL: 1.4 {RATIO} (ref 1.4–2.6)
ALP SERPL-CCNC: 87 U/L (ref 43–160)
ALT SERPL-CCNC: 7 U/L (ref 10–40)
ANION GAP SERPL CALC-SCNC: 8 MMOL/L (ref 8–19)
APPEARANCE UR: CLEAR
AST SERPL-CCNC: 21 U/L (ref 15–50)
BASOPHILS # BLD AUTO: 0.02 10*3/UL (ref 0–0.2)
BASOPHILS NFR BLD AUTO: 1 % (ref 0–3)
BILIRUB SERPL-MCNC: 0.91 MG/DL (ref 0.2–1.3)
BILIRUB UR QL: ABNORMAL
BUN SERPL-MCNC: 12 MG/DL (ref 5–25)
BUN/CREAT SERPL: 17 {RATIO} (ref 6–20)
CALCIUM SERPL-MCNC: 10.7 MG/DL (ref 8.7–10.4)
CHLORIDE SERPL-SCNC: 105 MMOL/L (ref 99–111)
COLOR UR: ABNORMAL
CONV ABS IMM GRAN: 0 10*3/UL (ref 0–0.54)
CONV BACTERIA: NEGATIVE
CONV CO2: 27 MMOL/L (ref 22–32)
CONV COLLECTION SOURCE (UA): ABNORMAL
CONV CRYSTALS: ABNORMAL /[HPF]
CONV EOSINOPHILS PERCENT BY MANUAL COUNT: 10.9 % (ref 0–7)
CONV IMMATURE GRAN: 0 % (ref 0–0.4)
CONV TOTAL PROTEIN: 5.8 G/DL (ref 6.3–8.2)
CONV UROBILINOGEN IN URINE BY AUTOMATED TEST STRIP: 1 {EHRLICHU}/DL (ref 0.1–1)
CREAT UR-MCNC: 0.7 MG/DL (ref 0.5–0.9)
EOSINOPHIL # BLD MANUAL: 0.22 10*3/UL (ref 0–0.7)
ERYTHROCYTE [DISTWIDTH] IN BLOOD BY AUTOMATED COUNT: 15.5 % (ref 11.5–14.5)
ERYTHROCYTE [DISTWIDTH] IN BLOOD BY AUTOMATED COUNT: 57.1 FL
FERRITIN SERPL-MCNC: 60 NG/ML (ref 10–200)
FOLATE SERPL-MCNC: 11.5 NG/ML (ref 4.8–20)
GFR SERPLBLD BASED ON 1.73 SQ M-ARVRAT: >60 ML/MIN/{1.73_M2}
GLOBULIN UR ELPH-MCNC: 2.4 G/DL (ref 2–3.5)
GLUCOSE SERPL-MCNC: 105 MG/DL (ref 65–99)
GLUCOSE UR QL: NEGATIVE MG/DL
HBA1C MFR BLD: 11.1 G/DL (ref 12–16)
HCT VFR BLD AUTO: 36.1 % (ref 37–47)
HGB UR QL STRIP: NEGATIVE
IRON SATN MFR SERPL: 17 % (ref 20–55)
IRON SERPL-MCNC: 69 UG/DL (ref 60–170)
KETONES UR QL STRIP: NEGATIVE MG/DL
LEUKOCYTE ESTERASE UR QL STRIP: ABNORMAL
LYMPHOCYTES # BLD AUTO: 0.62 10*3/UL (ref 1–5)
LYMPHOCYTES NFR BLD AUTO: 30.7 % (ref 20–45)
MCH RBC QN AUTO: 30.9 PG (ref 27–31)
MCHC RBC AUTO-ENTMCNC: 30.7 G/DL (ref 33–37)
MCV RBC AUTO: 100.6 FL (ref 81–99)
MONOCYTES # BLD AUTO: 0.26 10*3/UL (ref 0.2–1.2)
MONOCYTES NFR BLD MANUAL: 12.9 % (ref 3–10)
MUCOUS THREADS URNS QL MICRO: ABNORMAL
NEUTROPHILS # BLD AUTO: 0.9 10*3/UL (ref 2–8)
NEUTROPHILS NFR BLD MANUAL: 44.5 % (ref 30–85)
NITRITE UR QL STRIP: NEGATIVE
OSMOLALITY SERPL CALC.SUM OF ELEC: 282 MOSM/KG (ref 273–304)
PH UR STRIP.AUTO: 5.5 [PH] (ref 5–8)
PLATELET # BLD AUTO: 66 10*3/UL (ref 130–400)
PMV BLD AUTO: 11.2 FL (ref 7.4–10.4)
POTASSIUM SERPL-SCNC: 3.7 MMOL/L (ref 3.5–5.3)
PROT UR QL: ABNORMAL MG/DL
RBC #/AREA URNS HPF: ABNORMAL /[HPF]
RBC MORPH BLD: 3.59 10*6/UL (ref 4.2–5.4)
SODIUM SERPL-SCNC: 136 MMOL/L (ref 135–147)
SP GR UR: 1.04 (ref 1–1.03)
SQUAMOUS SPT QL MICRO: ABNORMAL /[HPF]
T4 FREE SERPL-MCNC: 1.1 NG/DL (ref 0.9–1.8)
TIBC SERPL-MCNC: 402 UG/DL (ref 245–450)
TRANSFERRIN SERPL-MCNC: 281 MG/DL (ref 250–380)
TSH SERPL-ACNC: 3.2 M[IU]/L (ref 0.27–4.2)
VIT B12 SERPL-MCNC: 468 PG/ML (ref 211–911)
WBC # BLD AUTO: 2.02 10*3/UL (ref 4.8–10.8)
WBC #/AREA URNS HPF: ABNORMAL /[HPF]

## 2020-12-01 ENCOUNTER — OFFICE VISIT CONVERTED (OUTPATIENT)
Dept: RADIATION ONCOLOGY | Facility: HOSPITAL | Age: 75
End: 2020-12-01
Attending: INTERNAL MEDICINE

## 2020-12-01 LAB
ALBUMIN SERPL-MCNC: 3.2 G/DL (ref 3.5–5)
ALBUMIN/GLOB SERPL: 1.4 {RATIO} (ref 1.4–2.6)
ALP SERPL-CCNC: 102 U/L (ref 43–160)
ALT SERPL-CCNC: 4 U/L (ref 10–40)
ANION GAP SERPL CALC-SCNC: 8 MMOL/L (ref 8–19)
AST SERPL-CCNC: 18 U/L (ref 15–50)
BASOPHILS # BLD AUTO: 0.02 10*3/UL (ref 0–0.2)
BASOPHILS NFR BLD AUTO: 1.2 % (ref 0–3)
BILIRUB SERPL-MCNC: 0.96 MG/DL (ref 0.2–1.3)
BUN SERPL-MCNC: 14 MG/DL (ref 5–25)
BUN/CREAT SERPL: 22 {RATIO} (ref 6–20)
CALCIUM SERPL-MCNC: 10.2 MG/DL (ref 8.7–10.4)
CHLORIDE SERPL-SCNC: 106 MMOL/L (ref 99–111)
CONV ABS IMM GRAN: 0 10*3/UL (ref 0–0.54)
CONV CO2: 27 MMOL/L (ref 22–32)
CONV EOSINOPHILS PERCENT BY MANUAL COUNT: 6.4 % (ref 0–7)
CONV IMMATURE GRAN: 0 % (ref 0–0.4)
CONV TOTAL PROTEIN: 5.5 G/DL (ref 6.3–8.2)
CREAT UR-MCNC: 0.64 MG/DL (ref 0.5–0.9)
EOSINOPHIL # BLD MANUAL: 0.11 10*3/UL (ref 0–0.7)
ERYTHROCYTE [DISTWIDTH] IN BLOOD BY AUTOMATED COUNT: 16.8 % (ref 11.5–14.5)
ERYTHROCYTE [DISTWIDTH] IN BLOOD BY AUTOMATED COUNT: 61 FL
GFR SERPLBLD BASED ON 1.73 SQ M-ARVRAT: >60 ML/MIN/{1.73_M2}
GLOBULIN UR ELPH-MCNC: 2.3 G/DL (ref 2–3.5)
GLUCOSE SERPL-MCNC: 88 MG/DL (ref 65–99)
HBA1C MFR BLD: 10.4 G/DL (ref 12–16)
HCT VFR BLD AUTO: 33 % (ref 37–47)
LYMPHOCYTES # BLD AUTO: 0.57 10*3/UL (ref 1–5)
LYMPHOCYTES NFR BLD AUTO: 32.9 % (ref 20–45)
MCH RBC QN AUTO: 31.5 PG (ref 27–31)
MCHC RBC AUTO-ENTMCNC: 31.5 G/DL (ref 33–37)
MCV RBC AUTO: 100 FL (ref 81–99)
MONOCYTES # BLD AUTO: 0.24 10*3/UL (ref 0.2–1.2)
MONOCYTES NFR BLD MANUAL: 13.9 % (ref 3–10)
NEUTROPHILS # BLD AUTO: 0.79 10*3/UL (ref 2–8)
NEUTROPHILS NFR BLD MANUAL: 45.6 % (ref 30–85)
OSMOLALITY SERPL CALC.SUM OF ELEC: 284 MOSM/KG (ref 273–304)
PLATELET # BLD AUTO: 63 10*3/UL (ref 130–400)
PMV BLD AUTO: 10 FL (ref 7.4–10.4)
POTASSIUM SERPL-SCNC: 3.6 MMOL/L (ref 3.5–5.3)
RBC MORPH BLD: 3.3 10*6/UL (ref 4.2–5.4)
SODIUM SERPL-SCNC: 137 MMOL/L (ref 135–147)
WBC # BLD AUTO: 1.73 10*3/UL (ref 4.8–10.8)

## 2020-12-22 ENCOUNTER — OFFICE VISIT CONVERTED (OUTPATIENT)
Dept: RADIATION ONCOLOGY | Facility: HOSPITAL | Age: 75
End: 2020-12-22
Attending: INTERNAL MEDICINE

## 2020-12-22 LAB
BASOPHILS # BLD AUTO: 0.02 10*3/UL (ref 0–0.2)
BASOPHILS NFR BLD AUTO: 1.2 % (ref 0–3)
CONV ABS IMM GRAN: 0 10*3/UL (ref 0–0.54)
CONV EOSINOPHILS PERCENT BY MANUAL COUNT: 3.6 % (ref 0–7)
CONV IMMATURE GRAN: 0 % (ref 0–0.4)
EOSINOPHIL # BLD MANUAL: 0.06 10*3/UL (ref 0–0.7)
ERYTHROCYTE [DISTWIDTH] IN BLOOD BY AUTOMATED COUNT: 18.3 % (ref 11.5–14.5)
ERYTHROCYTE [DISTWIDTH] IN BLOOD BY AUTOMATED COUNT: 69.1 FL
HBA1C MFR BLD: 9.2 G/DL (ref 12–16)
HCT VFR BLD AUTO: 30.1 % (ref 37–47)
LYMPHOCYTES # BLD AUTO: 0.42 10*3/UL (ref 1–5)
LYMPHOCYTES NFR BLD AUTO: 25.3 % (ref 20–45)
MCH RBC QN AUTO: 31 PG (ref 27–31)
MCHC RBC AUTO-ENTMCNC: 30.6 G/DL (ref 33–37)
MCV RBC AUTO: 101.3 FL (ref 81–99)
MONOCYTES # BLD AUTO: 0.21 10*3/UL (ref 0.2–1.2)
MONOCYTES NFR BLD MANUAL: 12.7 % (ref 3–10)
NEUTROPHILS # BLD AUTO: 0.95 10*3/UL (ref 2–8)
NEUTROPHILS NFR BLD MANUAL: 57.2 % (ref 30–85)
PLATELET # BLD AUTO: 67 10*3/UL (ref 130–400)
PMV BLD AUTO: 11.2 FL (ref 7.4–10.4)
RBC MORPH BLD: 2.97 10*6/UL (ref 4.2–5.4)
WBC # BLD AUTO: 1.66 10*3/UL (ref 4.8–10.8)

## 2020-12-29 ENCOUNTER — HOSPITAL ENCOUNTER (OUTPATIENT)
Dept: OTHER | Facility: HOSPITAL | Age: 75
Setting detail: RECURRING SERIES
Discharge: HOME | End: 2021-03-29
Attending: INTERNAL MEDICINE

## 2020-12-29 ENCOUNTER — OFFICE VISIT CONVERTED (OUTPATIENT)
Dept: RADIATION ONCOLOGY | Facility: HOSPITAL | Age: 75
End: 2020-12-29
Attending: INTERNAL MEDICINE

## 2020-12-29 LAB
ALBUMIN SERPL-MCNC: 3.2 G/DL (ref 3.5–5)
ALBUMIN/GLOB SERPL: 1.3 {RATIO} (ref 1.4–2.6)
ALP SERPL-CCNC: 160 U/L (ref 43–160)
ALT SERPL-CCNC: 7 U/L (ref 10–40)
ANION GAP SERPL CALC-SCNC: 6 MMOL/L (ref 8–19)
AST SERPL-CCNC: 18 U/L (ref 15–50)
BASOPHILS # BLD AUTO: 0.02 10*3/UL (ref 0–0.2)
BASOPHILS NFR BLD AUTO: 0.8 % (ref 0–3)
BILIRUB SERPL-MCNC: 1.49 MG/DL (ref 0.2–1.3)
BUN SERPL-MCNC: 19 MG/DL (ref 5–25)
BUN/CREAT SERPL: 22 {RATIO} (ref 6–20)
CALCIUM SERPL-MCNC: 9.7 MG/DL (ref 8.7–10.4)
CHLORIDE SERPL-SCNC: 100 MMOL/L (ref 99–111)
CONV ABS IMM GRAN: 0 10*3/UL (ref 0–0.54)
CONV CO2: 31 MMOL/L (ref 22–32)
CONV EOSINOPHILS PERCENT BY MANUAL COUNT: 2.5 % (ref 0–7)
CONV IMMATURE GRAN: 0 % (ref 0–0.4)
CONV TOTAL PROTEIN: 5.7 G/DL (ref 6.3–8.2)
CREAT UR-MCNC: 0.87 MG/DL (ref 0.5–0.9)
EOSINOPHIL # BLD MANUAL: 0.06 10*3/UL (ref 0–0.7)
ERYTHROCYTE [DISTWIDTH] IN BLOOD BY AUTOMATED COUNT: 18.5 % (ref 11.5–14.5)
ERYTHROCYTE [DISTWIDTH] IN BLOOD BY AUTOMATED COUNT: 69.1 FL
GFR SERPLBLD BASED ON 1.73 SQ M-ARVRAT: >60 ML/MIN/{1.73_M2}
GLOBULIN UR ELPH-MCNC: 2.5 G/DL (ref 2–3.5)
GLUCOSE SERPL-MCNC: 111 MG/DL (ref 65–99)
HBA1C MFR BLD: 9.2 G/DL (ref 12–16)
HCT VFR BLD AUTO: 29.4 % (ref 37–47)
LYMPHOCYTES # BLD AUTO: 0.43 10*3/UL (ref 1–5)
LYMPHOCYTES NFR BLD AUTO: 17.7 % (ref 20–45)
MCH RBC QN AUTO: 31.6 PG (ref 27–31)
MCHC RBC AUTO-ENTMCNC: 31.3 G/DL (ref 33–37)
MCV RBC AUTO: 101 FL (ref 81–99)
MONOCYTES # BLD AUTO: 0.27 10*3/UL (ref 0.2–1.2)
MONOCYTES NFR BLD MANUAL: 11.1 % (ref 3–10)
NEUTROPHILS # BLD AUTO: 1.65 10*3/UL (ref 2–8)
NEUTROPHILS NFR BLD MANUAL: 67.9 % (ref 30–85)
OSMOLALITY SERPL CALC.SUM OF ELEC: 281 MOSM/KG (ref 273–304)
PLATELET # BLD AUTO: 72 10*3/UL (ref 130–400)
PMV BLD AUTO: 10.9 FL (ref 7.4–10.4)
POTASSIUM SERPL-SCNC: 3.3 MMOL/L (ref 3.5–5.3)
RBC MORPH BLD: 2.91 10*6/UL (ref 4.2–5.4)
SODIUM SERPL-SCNC: 134 MMOL/L (ref 135–147)
WBC # BLD AUTO: 2.43 10*3/UL (ref 4.8–10.8)

## 2021-01-19 ENCOUNTER — OFFICE VISIT CONVERTED (OUTPATIENT)
Dept: RADIATION ONCOLOGY | Facility: HOSPITAL | Age: 76
End: 2021-01-19
Attending: INTERNAL MEDICINE

## 2021-01-19 LAB
ALBUMIN SERPL-MCNC: 3.3 G/DL (ref 3.5–5)
ALBUMIN/GLOB SERPL: 1.4 {RATIO} (ref 1.4–2.6)
ALP SERPL-CCNC: 180 U/L (ref 43–160)
ALT SERPL-CCNC: 7 U/L (ref 10–40)
ANION GAP SERPL CALC-SCNC: 8 MMOL/L (ref 8–19)
AST SERPL-CCNC: 21 U/L (ref 15–50)
BASOPHILS # BLD AUTO: 0.02 10*3/UL (ref 0–0.2)
BASOPHILS NFR BLD AUTO: 1 % (ref 0–3)
BILIRUB SERPL-MCNC: 1.56 MG/DL (ref 0.2–1.3)
BUN SERPL-MCNC: 24 MG/DL (ref 5–25)
BUN/CREAT SERPL: 31 {RATIO} (ref 6–20)
CALCIUM SERPL-MCNC: 10.3 MG/DL (ref 8.7–10.4)
CHLORIDE SERPL-SCNC: 100 MMOL/L (ref 99–111)
CONV ABS IMM GRAN: 0 10*3/UL (ref 0–0.54)
CONV CO2: 28 MMOL/L (ref 22–32)
CONV EOSINOPHILS PERCENT BY MANUAL COUNT: 4.5 % (ref 0–7)
CONV IMMATURE GRAN: 0 % (ref 0–0.4)
CONV TOTAL PROTEIN: 5.7 G/DL (ref 6.3–8.2)
CREAT UR-MCNC: 0.78 MG/DL (ref 0.5–0.9)
EOSINOPHIL # BLD MANUAL: 0.09 10*3/UL (ref 0–0.7)
ERYTHROCYTE [DISTWIDTH] IN BLOOD BY AUTOMATED COUNT: 18.1 % (ref 11.5–14.5)
ERYTHROCYTE [DISTWIDTH] IN BLOOD BY AUTOMATED COUNT: 67 FL
FERRITIN SERPL-MCNC: 45 NG/ML (ref 10–200)
FOLATE SERPL-MCNC: 11.2 NG/ML (ref 4.8–20)
GFR SERPLBLD BASED ON 1.73 SQ M-ARVRAT: >60 ML/MIN/{1.73_M2}
GLOBULIN UR ELPH-MCNC: 2.4 G/DL (ref 2–3.5)
GLUCOSE SERPL-MCNC: 108 MG/DL (ref 65–99)
HBA1C MFR BLD: 8.9 G/DL (ref 12–16)
HCT VFR BLD AUTO: 28.8 % (ref 37–47)
IRON SATN MFR SERPL: 8 % (ref 20–55)
IRON SERPL-MCNC: 42 UG/DL (ref 60–170)
LYMPHOCYTES # BLD AUTO: 0.44 10*3/UL (ref 1–5)
LYMPHOCYTES NFR BLD AUTO: 21.8 % (ref 20–45)
MCH RBC QN AUTO: 31.3 PG (ref 27–31)
MCHC RBC AUTO-ENTMCNC: 30.9 G/DL (ref 33–37)
MCV RBC AUTO: 101.4 FL (ref 81–99)
MONOCYTES # BLD AUTO: 0.27 10*3/UL (ref 0.2–1.2)
MONOCYTES NFR BLD MANUAL: 13.4 % (ref 3–10)
NEUTROPHILS # BLD AUTO: 1.2 10*3/UL (ref 2–8)
NEUTROPHILS NFR BLD MANUAL: 59.3 % (ref 30–85)
OSMOLALITY SERPL CALC.SUM OF ELEC: 279 MOSM/KG (ref 273–304)
PLATELET # BLD AUTO: 58 10*3/UL (ref 130–400)
PMV BLD AUTO: 11.2 FL (ref 7.4–10.4)
POTASSIUM SERPL-SCNC: 3.9 MMOL/L (ref 3.5–5.3)
RBC MORPH BLD: 2.84 10*6/UL (ref 4.2–5.4)
SODIUM SERPL-SCNC: 132 MMOL/L (ref 135–147)
TIBC SERPL-MCNC: 496 UG/DL (ref 245–450)
TRANSFERRIN SERPL-MCNC: 347 MG/DL (ref 250–380)
VIT B12 SERPL-MCNC: 595 PG/ML (ref 211–911)
WBC # BLD AUTO: 2.02 10*3/UL (ref 4.8–10.8)

## 2021-01-25 ENCOUNTER — OFFICE VISIT CONVERTED (OUTPATIENT)
Dept: CARDIOLOGY | Facility: CLINIC | Age: 76
End: 2021-01-25
Attending: INTERNAL MEDICINE

## 2021-01-27 ENCOUNTER — OFFICE VISIT CONVERTED (OUTPATIENT)
Dept: SURGERY | Facility: CLINIC | Age: 76
End: 2021-01-27
Attending: SURGERY

## 2021-02-08 ENCOUNTER — HOSPITAL ENCOUNTER (OUTPATIENT)
Dept: PREADMISSION TESTING | Facility: HOSPITAL | Age: 76
Discharge: HOME | End: 2021-02-08
Attending: SURGERY

## 2021-02-09 LAB — SARS-COV-2 RNA SPEC QL NAA+PROBE: NOT DETECTED

## 2021-02-18 ENCOUNTER — OFFICE VISIT CONVERTED (OUTPATIENT)
Dept: RADIATION ONCOLOGY | Facility: HOSPITAL | Age: 76
End: 2021-02-18
Attending: INTERNAL MEDICINE

## 2021-03-15 ENCOUNTER — CONVERSION ENCOUNTER (OUTPATIENT)
Dept: CARDIOLOGY | Facility: CLINIC | Age: 76
End: 2021-03-15

## 2021-03-15 ENCOUNTER — OFFICE VISIT CONVERTED (OUTPATIENT)
Dept: CARDIOLOGY | Facility: CLINIC | Age: 76
End: 2021-03-15
Attending: INTERNAL MEDICINE

## 2021-03-22 ENCOUNTER — HOSPITAL ENCOUNTER (OUTPATIENT)
Dept: PREADMISSION TESTING | Facility: HOSPITAL | Age: 76
Discharge: HOME | End: 2021-03-22
Attending: SURGERY

## 2021-03-25 LAB — SARS-COV-2 RNA SPEC QL NAA+PROBE: NOT DETECTED

## 2021-03-26 ENCOUNTER — HOSPITAL ENCOUNTER (OUTPATIENT)
Dept: PERIOP | Facility: HOSPITAL | Age: 76
Setting detail: HOSPITAL OUTPATIENT SURGERY
Discharge: HOME | End: 2021-03-26
Attending: SURGERY

## 2021-04-01 ENCOUNTER — OFFICE VISIT CONVERTED (OUTPATIENT)
Dept: SURGERY | Facility: CLINIC | Age: 76
End: 2021-04-01
Attending: SURGERY

## 2021-05-11 NOTE — PROCEDURES
"   Procedure Note      Patient Name: Yareli Phillips   Patient ID: [id number]   Sex: Female   YOB: 1945    Primary Care Provider: Evangelina HOLLEY    Visit Date: November 16, 2020    Provider: Amos Vazquez MD   Location: [facility]   Location Address: [address] [place] [address], [address]   GINGER Foreman  [id number]   Location Phone: [phone]          FINAL REPORT   TRANSTHORACIC ECHOCARDIOGRAM REPORT    Diagnosis: Congestive heart failure, shortness of breath.   Height: 5'6\" Weight: 237 B/P: 136/92 BSA: 2.2   Tech: BNS   MEASUREMENTS:  RVID (Diastole) : RVID. (NORMAL: 0.7 to 2.4 cm max)   LVID (Systole): 4.1 cm (Diastole): 5.3 cm . (NORMAL: 3.7 - 5.4 cm)   Posterior Wall Thickness (Diastole): 0.9 cm. (NORMAL: 0.8 - 1.1 cm)   Septal Thickness (Diastole): 0.8 cm. (NORMAL: 0.7 - 1.2 cm)   LAID (Systole): 4.0 cm. (NORMAL: 1.9 - 3.8 cm)   Aortic Root Diameter (Diastole): 3.1 cm. (NORMAL: 2.0 - 3.7 cm)   COMMENTS:  The patient underwent 2-D, M-Mode, and Doppler examination, including pulse-wave, continuous-wave, and color-flow analysis; the study is technically adequate.   FINDINGS:  MITRAL VALVE: Mild fibrocalcific changes noted.   AORTIC VALVE: Normal with three cusps. Normal central closure. No evidence of any obstruction.   TRICUSPID VALVE: Normal.   PULMONIC VALVE: Normal.   LEFT ATRIUM: Enlarged. No masses seen. LA volume is 42 mL/m2.   AORTIC ROOT: Normal in size and motion.   LEFT VENTRICLE: The left ventricular chamber size is normal. The left ventricular wall thickness is normal. The left ventricular systolic function is reduced with an estimated EF of 45%. No significant regional wall motion abnormalities are identified.   RIGHT ATRIUM: Normal.   RIGHT VENTRICLE: Normal size and function.   PERICARDIUM: No effusion.   INFERIOR VENA CAVA: Diameter is 2.4 cm with less than 50% reduction with inspiration.   DOPPLER: Pulse-wave, continuous wave, and color-flow Doppler evaluation was performed. E/A " ratio is Afib. DT= 127 msec. IVRT is 67 msec. E/E' is 8. There is moderate mitral valve regurgitation present. There is tricuspid regurgitation with estimated pulmonary artery systolic pressure of 45 mmHg.   Faxed: 11/17/2020      CONCLUSION:  1.  Left ventricular chamber size is normal. The left ventricular wall thickness is normal. The left ventricular        systolic function is reduced with an estimated EF of 45%. No significant regional wall motion abnormalities        are identified.   2.  Moderate mitral valve regurgitation.   3.  Tricuspid regurgitation with estimated pulmonary artery systolic pressure of 45 mmHg.   4.  The LV function is slightly worse compared to echocardiogram of August 2020.    cc:  MD Amos Schultz MD, [facility]  PM/pap    This note was transcribed by Evie Palafox.  pap/pm  The above service was transcribed by Evie Palafox, and I attest to the accuracy of the note.  PM                 Electronically Signed by: Ekta Palafox-, Other -Author on November 17, 2020 02:53:50 PM  Electronically Co-signed by: Amos Vazquez MD -Reviewer on November 21, 2020 05:34:51 PM

## 2021-05-11 NOTE — H&P
History and Physical      Patient Name: Yareli Phillips   Patient ID: [id number]   Sex: Female   YOB: 1945    Primary Care Provider: Evangelina HOLLEY    Visit Date: August 26, 2020    Provider: Selena Dempsey MD   Location: [facility]   Location Address: [address] [place] [address]  [id number]   Location Phone: [phone]          Chief Complaint  · Follow Up Surgery      History Of Present Illness  Yareli Phillips is a 75 year old /White female who is here today for follow up of: breast conserving therapy with sentinel node.   She is doing well-status post surgery. Pathology was negative margins and negative nodes.       Past Medical History  Breast cancer         Past Surgical History  Port Placement         Medication List  amlodipine 2.5 mg oral tablet; carvedilol 25 mg oral tablet; Centrum Silver oral; Citracal + D3 (calcium phos) 250 mg calcium- 500 unit oral tablet,chewable; Eliquis 5 mg oral tablet; Fiber Therapy 500 mg oral tablet; gabapentin 300 mg oral capsule; glucosamine-chondroitin oral; hydrochlorothiazide 12.5 mg oral tablet; hydrochlorothiazide 12.5 mg oral capsule; lisinopril-hydrochlorothiazide 10-12.5 mg oral tablet; metoprolol succinate 50 mg oral tablet extended release 24 hr; triamcinolone acetonide 0.1 % topical cream         Allergy List  NO KNOWN DRUG ALLERGIES       Allergies Reconciled  Social History  Tobacco (Never)         Review of Systems  · Constitutional  o Denies  o : fever, chills  · Eyes  o Denies  o : yellowish discoloration of the eyes, eye pain  · HENT  o Denies  o : difficulty swallowing, hoarseness  · Breasts  o * See HPI  · Cardiovascular  o Denies  o : chest pain on exertion, irregular heart beats  · Respiratory  o Denies  o : shortness of breath, cough  · Gastrointestinal  o Denies  o : nausea, vomiting, diarrhea, constipation  · Integument  o Admits  o : see HPI for surgical incision healing  · Neurologic  o Denies  o :  "tingling or numbness, loss of balance  · Musculoskeletal  o Denies  o : joint pain, back pain  · Endocrine  o Denies  o : weight gain, weight loss  · All Others Negative      Vitals  Date Time BP Position Site L\R Cuff Size HR RR TEMP (F) WT  HT  BMI kg/m2 BSA m2 O2 Sat HC       08/26/2020 10:42 AM       15  232lbs 16oz 5'  6\" 37.61 2.22           Physical Examination  · Constitutional  o Appearance  o : well developed/well nourished patient in no apparent distress  · Respiratory  o Inspection of Chest  o : equal breaths bilaterally  · Gastrointestinal  o Abdominal Examination  o : soft/nontender, nondistended, no organomegaly appreciated  · Post Surgical Incision  o Surgical wound  o : healing well, no erythema          Assessment  · Postoperative Exam Following Surgery     V67.00/Z09      Plan  · Orders  o RADIATION ONCOLOGY CONSULT (RADON) - V67.00/Z09 - 09/02/2020  · Medications  o Medications have been Reconciled  o Transition of Care or Provider Policy  · Instructions  o Return to office with any issues.  o Consult rad onc for therapy  o F/U in 4 weeks.            Electronically Signed by: Selena Dempsey MD -Author on August 26, 2020 10:53:39 AM  "

## 2021-05-11 NOTE — H&P
History and Physical      Patient Name: Yareli Phillips   Patient ID: [id number]   Sex: Female   YOB: 1945    Primary Care Provider: Evangelina HOLLEY   Referring Provider: Evangelina HOLLEY    Visit Date: April 30, 2020    Provider: Selena Dempsey MD   Location: [facility]   Location Address: [address] [place] [address]  [id number]   Location Phone: [phone]          Chief Complaint  · Follow Up Surgery      History Of Present Illness  Yareli Phillips is a 74 year old /White female who is here today for follow up of: Central Venous Access Catheter Placement -Right Side.   She is doing well-status post surgery.       Past Surgical History  Port Placement         Medication List  amlodipine 2.5 mg oral tablet; Centrum Silver oral; Citracal + D3 (calcium phos) 250 mg calcium- 500 unit oral tablet,chewable; Eliquis 5 mg oral tablet; Fiber Therapy 500 mg oral tablet; gabapentin 300 mg oral capsule; glucosamine-chondroitin oral; hydrochlorothiazide 12.5 mg oral tablet; hydrochlorothiazide 12.5 mg oral capsule; lisinopril-hydrochlorothiazide 10-12.5 mg oral tablet; metoprolol succinate 50 mg oral tablet extended release 24 hr; triamcinolone acetonide 0.1 % topical cream         Allergy List  NO KNOWN DRUG ALLERGIES       Allergies Reconciled  Social History  Tobacco (Never)         Review of Systems  · Constitutional  o Denies  o : fever, chills  · Eyes  o Denies  o : yellowish discoloration of the eyes, eye pain  · HENT  o Denies  o : difficulty swallowing, hoarseness  · Cardiovascular  o Denies  o : chest pain on exertion, irregular heart beats  · Respiratory  o Denies  o : shortness of breath, cough  · Gastrointestinal  o Denies  o : nausea, vomiting, diarrhea, constipation  · Integument  o Admits  o : see HPI for surgical incision healing  · Neurologic  o Denies  o : tingling or numbness, loss of balance  · Musculoskeletal  o Denies  o : joint pain, back  "pain  · Endocrine  o Denies  o : weight gain, weight loss  · All Others Negative      Vitals  Date Time BP Position Site L\R Cuff Size HR RR TEMP (F) WT  HT  BMI kg/m2 BSA m2 O2 Sat HC       04/30/2020 09:01 AM       14  238lbs 0oz 5'  6\" 38.41 2.24           Physical Examination  · Constitutional  o Appearance  o : well developed/well nourished patient in no apparent distress  · Respiratory  o Inspection of Chest  o : equal breaths bilaterally  · Gastrointestinal  o Abdominal Examination  o : soft/nontender, nondistended, no organomegaly appreciated  · Post Surgical Incision  o Surgical wound  o : healing well, no erythema          Assessment  · Postoperative Exam Following Surgery     V67.00/Z09      Plan  · Medications  o Medications have been Reconciled  o Transition of Care or Provider Policy  · Instructions  o Return to office with any issues.  o F/U PRN            Electronically Signed by: Selena Dempsey MD -Author on April 30, 2020 09:09:09 AM  "

## 2021-05-11 NOTE — PROCEDURES
"   Procedure Note      Patient Name: Yareli Phillips   Patient ID: [id number]   Sex: Female   YOB: 1945    Primary Care Provider: Evangelina HOLLEY   Referring Provider: Evangelina HOLLEY    Visit Date: August 5, 2020    Provider: Amos Vazquez MD   Location: [facility]   Location Address: [address] [place] [address], [address]   GINGER Foreman  [id number]   Location Phone: [phone]          FINAL REPORT   TRANSTHORACIC ECHOCARDIOGRAM REPORT    Diagnosis: Atrial Fibrillation   Height: 5'6\" Weight: 223 B/P: 90/62 BSA: 2.1   Tech: StemSaveTW   MEASUREMENTS:  RVID (Diastole) : RVID. (NORMAL: 0.7 to 2.4 cm max)   LVID (Systole): 3.8 cm (Diastole): 5.5 cm . (NORMAL: 3.7 - 5.4 cm)   Posterior Wall Thickness (Diastole): 1.0 cm. (NORMAL: 0.8 - 1.1 cm)   Septal Thickness (Diastole): 1.0 cm. (NORMAL: 0.7 - 1.2 cm)   LAID (Systole): 3.6 cm. (NORMAL: 1.9 - 3.8 cm)   Aortic Root Diameter (Diastole): 2.9 cm. (NORMAL: 2.0 - 3.7 cm)   COMMENTS:  COMMENT: The patient underwent 2-D, M-Mode, and Doppler examination, including pulse-wave, continuous-wave, and color-flow Doppler analysis; the study is technically adequate. The following was observed:   FINDINGS:  MITRAL VALVE: Mild fibrocalcific changes noted.   AORTIC VALVE: Normal with three cusps. Normal central closure. No evidence of any obstruction.   TRICUSPID VALVE: Normal.   PULMONIC VALVE: Normal.   LEFT ATRIUM: Enlarged, no masses seen. LA volume is 37 mL/m2.   AORTIC ROOT: Normal in size and motion.   LEFT VENTRICLE: The left ventricular chamber size is borderline enlarged. The left ventricular wall thickness is normal. The left ventricular systolic function is mildly reduced with an estimated ejection fraction of 50%. No significant regional wall motion abnormalities are identified.   RIGHT ATRIUM: Enlarged.   RIGHT VENTRICLE: Prominent.   PERICARDIUM: No effusion.   INFERIOR VENA CAVA: Diameter is 2.1 cm with less than 50% reduction with inspiration. "   DOPPLER: Pulse-wave, continuous wave, and color-flow Doppler evaluation was performed. E/A ratio is AFIB. DT= 173 msec. IVRT is 67 msec. E/e' is 10. There is moderate mitral valve regurgitation present. There is tricuspid regurgitation with estimated pulmonary artery systolic pressure of 45 mmHg.   Faxed: 08/06/2020      CONCLUSION:  1.  Mildly enlarged left ventricular chamber size with mild reduction in left ventricular systolic function with an        estimated ejection fraction of 50% with mild global hypokinesis.   2.  Moderate mitral valve regurgitation.   3.  Tricuspid regurgitation with mild pulmonary artery hypertension.  Estimated pulmonary artery systolic        pressure of 45 mmHg.       Amos Vazquez MD, FACC  PM/pap    This note was transcribed by Evie Palafox.  pap/pm  The above service was transcribed by Evie Palafox, and I attest to the accuracy of the note.  PM                 Electronically Signed by: Ekta Palafox-, Other -Author on August 6, 2020 10:31:07 AM  Electronically Co-signed by: Amos Vazquez MD -Reviewer on August 6, 2020 01:58:01 PM

## 2021-05-11 NOTE — H&P
History and Physical      Patient Name: Yareli Phillips   Patient ID: [id number]   Sex: Female   YOB: 1945    Primary Care Provider: Evangelina HOLLEY    Visit Date: August 13, 2020    Provider: Selena Dempsey MD   Location: [facility]   Location Address: [address] [place] [address]  [id number]   Location Phone: [phone]          Chief Complaint  · Breast Cancer  · Pre-Surgical History and Physical Examination for [facility]  · Consents for Surgery      History Of Present Illness  Yareli Phillips is a 75 year old /White female who is referred from Jocelyn Camp MD ;   Palpation-guided or Image Guided needle biopsy:    * Image guided needle biospy performed. Results from the needle guided biopsy are IDC with ER/KY + and her 2 + with ki 55%.          She has completed several of her chemotherapy sessions but is no longer able to tolerate them so a followup MRI was done and lesion had decreased in size from 3.2 to 2.8 and she still has  an area that needs to be excised about 1 cm anterior to the mass.  She would like to try for a lumpectomy.  We discussed all surgery options. We will leave the port for Herceptin therapy.       Past Medical History  Breast cancer         Past Surgical History  Port Placement         Medication List  amlodipine 2.5 mg oral tablet; Centrum Silver oral; Citracal + D3 (calcium phos) 250 mg calcium- 500 unit oral tablet,chewable; Eliquis 5 mg oral tablet; Fiber Therapy 500 mg oral tablet; gabapentin 300 mg oral capsule; glucosamine-chondroitin oral; hydrochlorothiazide 12.5 mg oral tablet; hydrochlorothiazide 12.5 mg oral capsule; lisinopril-hydrochlorothiazide 10-12.5 mg oral tablet; metoprolol succinate 50 mg oral tablet extended release 24 hr; triamcinolone acetonide 0.1 % topical cream         Allergy List  NO KNOWN DRUG ALLERGIES       Allergies Reconciled  Social History  Tobacco (Never)         Review of  "Systems  · Constitutional  o Denies  o : fever, chills  · Breasts  o * See HPI  · Cardiovascular  o Denies  o : chest pain, cardiac murmurs, irregular heart beats, dyspnea on exertion  · Integument  o Denies  o : rash, itching, new skin lesions  · Heme-Lymph  o Denies  o : easy bleeding, easy bruising, lymph node enlargement or tenderness      Vitals  Date Time BP Position Site L\R Cuff Size HR RR TEMP (F) WT  HT  BMI kg/m2 BSA m2 O2 Sat        08/13/2020 08:27 AM       16  222lbs 16oz 5'  6\" 35.99 2.17           Physical Examination  · Constitutional  o Appearance  o : A well-nourished, well-developed patient who ambulates without difficulty, Alert and Oriented X3.  · Respiratory  o Respiratory Effort  o : breathing unlabored  o Inspection of Chest  o : breaths equal bilaterally  · Breasts  o Inspection of Breasts  o : developmental state normal for age  o Palpation of Breasts, Axillae  o : no masses or tenderness noted on palpation, biopsy site well healed, no LAD          Assessment  · Malignant neoplasm of upper-outer quadrant of left breast in female, estrogen receptor positive       Malignant neoplasm of upper-outer quadrant of left female breast     174.4/C50.412  Estrogen receptor positive status [ER+]     174.4/Z17.0  · Preoperative Examination     V72.83      Plan  · Orders  o General Surgery Order (GENOR) - - 08/13/2020  o Lymphedema Clinic Consult (Order_PT/OT Consult for Bioimpedence Lymph fluid analysis,Pre-op and retest Post-op, every 3 mo. Yr. 1-3, 6 mo. Yr. 4-5, annually year 6+) (LYMCL) - - 08/13/2020  o Needle localization, percutaneous, for wire placement, 1st lesion, mammographic guidance [facility] ([id number]) - 174.4/C50.412, 174.4/Z17.0 - 08/18/2020  o Needle localization, percutaneous, for wire placement, 1st lesion, ultrasound guidance [facility] ([id number]) - 174.4/C50.412, 174.4/Z17.0 - 08/18/2020  o [facility] Pre-Op Covid-19 Screening ([id number]) - 174.4/C50.412, 174.4/Z17.0 - 08/13/2020  o General Surgery Order " (GENOR) - 174.4/C50.412, 174.4/Z17.0 - 08/18/2020  o [place] Node(Left) Identification (Detection) [[id number]] ([id number]) - 174.4/C50.412, 174.4/Z17.0 - 08/18/2020  · Medications  o Medications have been Reconciled  o Transition of Care or Provider Policy  · Instructions  o DISCUSSION:  o The patient has a primary breast cancer confirmed by biopsy. I have reviewed the radiographic studies and pathology report. I have recommended a surgical procedure to the patient as a option in treatment. Other options were discussed in detail with ample time to answer questions.  o PLAN:  o Schedule operation  o ****Surgical Treatment Addendum****  o Breast Conserving Therapy: Offered  o [place] node dissection offfered and will perform as a part of surgery.  o Reconstructive/Plastice Surgery referral offered prior to surgery and patient declined. Reason: bct  o Handouts Provided-Pre-Procedure Instructions including date and time and location of procedure.  o ****Surgical Orders****  o ****Patient Status****  o RISK AND BENEFITS:  o Consent for surgery: Given these options, the patient has verbally expressed an understanding of the risks of surgery and finds these risks acceptable. We will proceed with surgery as soon as possible.  o Possible risks, complications, benefits, and alternatives to surgical or invasive procedure have been explained to patient and/or legal guardian.  o O.R. PREP: Per protocol  o IV: Per Anesthesia  o Please sign permit for: Left breast needle localized lumpectomy with sentinel node identification and biopsy  o Kefzol 2 grams IV on call to OR.  o The above History and Physical Examination has been completed within 30 days of admission.            Electronically Signed by: Selena Dempsey MD -Author on August 13, 2020 08:47:41 AM

## 2021-05-11 NOTE — PROCEDURES
"   Procedure Note      Patient Name: Yareli Phillips   Patient ID: [id number]   Sex: Female   YOB: 1945    Primary Care Provider: Evangelina HOLLEY    Visit Date: March 15, 2021    Provider: Amos Vazquez MD   Location: [facility]   Location Address: [address] [place] [address], [address]   GINGER Foreman  [id number]   Location Phone: [phone]          FINAL REPORT   TRANSTHORACIC ECHOCARDIOGRAM REPORT    Diagnosis: CHF (Congestive Heart Failure)   Height: 5'6\" Weight: 201 B/P: 116/54 BSA: 2.0   Tech: BNS   MEASUREMENTS:  RVID (Diastole) : RVID. (NORMAL: 0.7 to 2.4 cm max)   LVID (Systole): 3.3 cm (Diastole): 4.3 cm . (NORMAL: 3.7 - 5.4 cm)   Posterior Wall Thickness (Diastole): 0.9 cm. (NORMAL: 0.8 - 1.1 cm)   Septal Thickness (Diastole): 1.0 cm. (NORMAL: 0.7 - 1.2 cm)   LAID (Systole): 4.2 cm. (NORMAL: 1.9 - 3.8 cm)   Aortic Root Diameter (Diastole): 3.1 cm. (NORMAL: 2.0 - 3.7 cm)   COMMENTS:  The patient underwent 2-D, M-Mode, and Doppler examination, including pulse-wave, continuous-wave, and color-flow analysis; the study is technically adequate.   FINDINGS:  MITRAL VALVE: Mild mitral valve prolapse with mild mitral valve regurgitation noted.   AORTIC VALVE: Normal with three cusps. Normal central closure. No evidence of any obstruction.   TRICUSPID VALVE: Normal.   PULMONIC VALVE: Normal.   LEFT ATRIUM: Enlarged, no masses seen. LA volume is 45 mL/m2.   AORTIC ROOT: Normal in size and motion.   LEFT VENTRICLE: The left ventricular chamber size is normal. The left ventricular wall thickness is normal. The left ventricular systolic function is normal with an estimated EF of 55%. No significant regional wall motion abnormalities are identified.   RIGHT ATRIUM: Enlarged.   RIGHT VENTRICLE: Normal size and function.   PERICARDIUM: No significant effusion noted.   INFERIOR VENA CAVA: Diameter is 2.4 cm with less than 50% reduction with inspiration.   DOPPLER: Pulse-wave, continuous wave, and color-flow " Doppler evaluation was performed. DT= 239 msec. IVRT is 56 msec. E/E' is 10. There is moderate mitral valve regurgitation present. There is tricuspid regurgitation with estimated pulmonary artery systolic pressure of 45 mmHg.   Faxed: 03/19/2021      CONCLUSION:  1.  Left ventricular chamber size is normal. The left ventricular wall thickness is normal. The left ventricular        systolic function is normal with an estimated EF of 55%. No significant regional wall motion abnormalities        are identified.   2.  Left ventricular diastolic dysfunction.  3.  Moderate mitral valve regurgitation with significant left atrial enlargement.  4.  Tricuspid regurgitation with estimated pulmonary artery systolic pressure of 45 mmHg with right atrial        enlargement.  5.  Compared to the previous echocardiogram, the LV function is slightly improved and the mitral regurgitation        and pulmonary hypertension is similar.      Amos Vazquez MD, [facility]  PM/pap                   Electronically Signed by: Ekta Palafox-, Other -Author on March 19, 2021 09:48:30 AM  Electronically Co-signed by: Amos Vazquez MD -Reviewer on March 19, 2021 02:51:33 PM

## 2021-05-11 NOTE — H&P
History and Physical      Patient Name: Yareli Phillips   Patient ID: [id number]   Sex: Female   YOB: 1945    Primary Care Provider: Evangelina HOLLEY    Visit Date: April 1, 2021    Provider: Selena Dempsey MD   Location: [facility]   Location Address: [address] [place] [address]  [id number]   Location Phone: [phone]          Chief Complaint  · Follow Up Surgery      History Of Present Illness  Yareli Phillips is a 75 year old /White female who is here today for follow up of: port removal.   She is doing well-status post surgery.       Past Medical History  Breast cancer         Past Surgical History  Port Placement         Medication List  amlodipine 2.5 mg oral tablet; carvedilol 25 mg oral tablet; Centrum Silver oral; Citracal + D3 (calcium phos) 250 mg calcium- 500 unit oral tablet,chewable; Eliquis 5 mg oral tablet; Fiber Therapy 500 mg oral tablet; gabapentin 300 mg oral capsule; glucosamine-chondroitin oral; hydrochlorothiazide 12.5 mg oral tablet; hydrochlorothiazide 12.5 mg oral capsule; Lasix 40 mg oral tablet; lisinopril-hydrochlorothiazide 10-12.5 mg oral tablet; losartan 25 mg oral tablet; metoprolol succinate 50 mg oral tablet extended release 24 hr; spironolactone 25 mg oral tablet; triamcinolone acetonide 0.1 % topical cream         Allergy List  NO KNOWN DRUG ALLERGIES       Allergies Reconciled  Family Medical History  *No Known Family History         Social History  Tobacco (Never)         Review of Systems  · Constitutional  o Denies  o : fever, chills  · Eyes  o Denies  o : yellowish discoloration of the eyes, eye pain  · HENT  o Denies  o : difficulty swallowing, hoarseness  · Cardiovascular  o Denies  o : chest pain on exertion, irregular heart beats  · Respiratory  o Denies  o : shortness of breath, cough  · Gastrointestinal  o Denies  o : nausea, vomiting, diarrhea, constipation  · Integument  o Admits  o : see HPI for surgical  incision healing  · Neurologic  o Denies  o : tingling or numbness, loss of balance  · Musculoskeletal  o Denies  o : joint pain, back pain  · Endocrine  o Denies  o : weight gain, weight loss  · All Others Negative      Physical Examination  · Constitutional  o Appearance  o : well developed/well nourished patient in no apparent distress  · Respiratory  o Inspection of Chest  o : equal breaths bilaterally  · Gastrointestinal  o Abdominal Examination  o : soft/nontender, nondistended, no organomegaly appreciated  · Post Surgical Incision  o Surgical wound  o : healing well, no erythema          Assessment  · Postoperative Exam Following Surgery     V67.00/Z09      Plan  · Medications  o Medications have been Reconciled  o Transition of Care or Provider Policy  · Instructions  o Return to office with any issues.  o F/U PRN            Electronically Signed by: Selena Dempsey MD -Author on April 1, 2021 09:49:33 AM

## 2021-05-11 NOTE — H&P
History and Physical      Patient Name: Yareli Phillips   Patient ID: [id number]   Sex: Female   YOB: 1945    Primary Care Provider: Evangelina HOLLEY   Referring Provider: Evangelina HOLLEY    Visit Date: April 9, 2020    Provider: Selena Dempsey MD   Location: [facility]   Location Address: [facility]  [place]  [id number]   Location Phone: [phone]          Chief Complaint  · Breast Cancer  · Pre-Surgical History and Physical Examination for [facility]  · Consents for Surgery      History Of Present Illness  Yareli Phillips is a 74 year old /White female who is referred from Evangelina HOLLEY ; she had a 2.7 x 2.5 cm mass at 3 oclcock located very posteriorly it was biopsied and returned as:   Palpation-guided or Image Guided needle biopsy:    * Image guided needle biospy performed. Results from the needle guided biopsy are IDC ER/CO moderately positive with Her 2 pending and her ki 67 is 55%.   Breast Cancer Staging:  * T 2 ;N 0 ;M 0       Medication List  amlodipine 2.5 mg oral tablet; Centrum Silver oral; Citracal + D3 (calcium phos) 250 mg calcium- 500 unit oral tablet,chewable; Eliquis 5 mg oral tablet; Fiber Therapy 500 mg oral tablet; gabapentin 300 mg oral capsule; glucosamine-chondroitin oral; hydrochlorothiazide 12.5 mg oral tablet; hydrochlorothiazide 12.5 mg oral capsule; lisinopril-hydrochlorothiazide 10-12.5 mg oral tablet; metoprolol succinate 50 mg oral tablet extended release 24 hr; triamcinolone acetonide 0.1 % topical cream         Social History  Tobacco (Never)         Review of Systems  · Constitutional  o Denies  o : fever, chills  · Breasts  o * See HPI  · Cardiovascular  o Denies  o : chest pain, cardiac murmurs, irregular heart beats, dyspnea on exertion  · Integument  o Denies  o : rash, itching, new skin lesions  · Heme-Lymph  o Denies  o : easy bleeding, easy bruising, lymph node enlargement or  "tenderness      Vitals  Date Time BP Position Site L\R Cuff Size HR RR TEMP (F) WT  HT  BMI kg/m2 BSA m2 O2 Sat HC       04/09/2020 01:43 PM       16  238lbs 0oz 5'  6\" 38.41 2.24           Physical Examination  · Constitutional  o Appearance  o : A well-nourished, well-developed patient who ambulates without difficulty, Alert and Oriented X3.  · Respiratory  o Respiratory Effort  o : breathing unlabored  o Inspection of Chest  o : breaths equal bilaterally  · Breasts  o Inspection of Breasts  o : developmental state normal for age  o Palpation of Breasts, Axillae  o : no masses or tenderness noted on palpation on left, her biopsy site is well healed and has a palpable hematoma, no skin ornipple lesions, no bulky LAD          Assessment  · Malignant neoplasm of upper-outer quadrant of left breast in female, estrogen receptor positive       Malignant neoplasm of upper-outer quadrant of left female breast     174.4/C50.412  Estrogen receptor positive status [ER+]     174.4/Z17.0  · Preoperative Examination     V72.83      Plan  · Orders  o General Surgery Order (GENOR) - - 04/09/2020  o Lymphedema Clinic Consult (Order_PT/OT Consult for Bioimpedence Lymph fluid analysis,Pre-op and retest Post-op, every 3 mo. Yr. 1-3, 6 mo. Yr. 4-5, annually year 6+) (LYMCL) - 174.4/C50.412, 174.4/Z17.0 - 04/09/2020  o MRI Breast Bilateral WITH and WITHOUT Contrast [facility] (05914) - 174.4/C50.412, 174.4/Z17.0 - 04/15/2020  o [place] Node(Left) Identification (Detection) [[id number]] (96033) - 174.4/C50.412, 174.4/Z17.0 - 04/21/2020  o Needle localization, percutaneous, for wire placement, 1st lesion, ultrasound guidance [facility] (00152) - 174.4/C50.412, 174.4/Z17.0 - 04/21/2020  o Needle localization, percutaneous, for wire placement, 1st lesion, mammographic guidance [facility] (63218) - 174.4/C50.412, 174.4/Z17.0 - 04/21/2020  · Medications  o Medications have been Reconciled  o Transition of Care or Provider " Policy  · Instructions  o DISCUSSION:  o The patient has a primary breast cancer confirmed by biopsy. I have reviewed the radiographic studies and pathology report. I have recommended a surgical procedure to the patient as a option in treatment. Other options were discussed in detail with ample time to answer questions.  o PLAN:  o Schedule operation  o ****Surgical Treatment Addendum****  o Breast Conserving Therapy: Offered  o [place] node dissection offfered and will perform as a part of surgery.  o Reconstructive/Plastice Surgery referral offered prior to surgery and patient declined. Reason: wants bct  o Handouts Provided-Pre-Procedure Instructions including date and time and location of procedure.  o ****Surgical Orders****  o ****Patient Status****  o RISK AND BENEFITS:  o Consent for surgery: Given these options, the patient has verbally expressed an understanding of the risks of surgery and finds these risks acceptable. We will proceed with surgery as soon as possible.  o Possible risks, complications, benefits, and alternatives to surgical or invasive procedure have been explained to patient and/or legal guardian.  o O.R. PREP: Per protocol  o IV: Per Anesthesia  o Please sign permit for: Left breast needle localized lumpectomy with sentinel node identification and biopsy  o PRE- OP MEDICATION ORDER:  o Kefzol 2 grams IV on call to OR.  o The above History and Physical Examination has been completed within 30 days of admission.            Electronically Signed by: Selena Dempsey MD -Author on April 9, 2020 02:23:53 PM

## 2021-05-11 NOTE — H&P
"   History and Physical      Patient Name: Yareli Phillips   Patient ID: [id number]   Sex: Female   YOB: 1945    Primary Care Provider: Evangelina HOLLEY    Visit Date: January 27, 2021    Provider: Selena Dempsey MD   Location: [facility]   Location Address: [address] [place] [address]  [id number]   Location Phone: [phone]          Chief Complaint  · Outpatient History & Physical / Surgical Orders  · No longer needs port      History Of Present Illness  Very pleasant lady who presents with a port that is no longer needed. Requeset Port removed.       Past Medical History  Breast cancer         Past Surgical History  Port Placement         Medication List  amlodipine 2.5 mg oral tablet; carvedilol 25 mg oral tablet; Centrum Silver oral; Citracal + D3 (calcium phos) 250 mg calcium- 500 unit oral tablet,chewable; Eliquis 5 mg oral tablet; Fiber Therapy 500 mg oral tablet; gabapentin 300 mg oral capsule; glucosamine-chondroitin oral; hydrochlorothiazide 12.5 mg oral tablet; hydrochlorothiazide 12.5 mg oral capsule; Lasix 40 mg oral tablet; lisinopril-hydrochlorothiazide 10-12.5 mg oral tablet; losartan 25 mg oral tablet; metoprolol succinate 50 mg oral tablet extended release 24 hr; spironolactone 25 mg oral tablet; triamcinolone acetonide 0.1 % topical cream         Allergy List  NO KNOWN DRUG ALLERGIES       Allergies Reconciled  Family Medical History  *No Known Family History         Social History  Tobacco (Never)         Review of Systems  · Cardiovascular  o Denies  o : chest pain on exertion, shortness of breath, lower extremity swelling  · Respiratory  o Denies  o : wheezing, chronic cough, coughing up blood  · Gastrointestinal  o Denies  o : chronic abdominal pain, reflux symptoms, diarrhea      Vitals  Date Time BP Position Site L\R Cuff Size HR RR TEMP (F) WT  HT  BMI kg/m2 BSA m2 O2 Sat FR L/min FiO2 HC       01/27/2021 09:48 AM       16  201lbs 0oz 5'  6\" " 32.44 2.06             Physical Examination  · Constitutional  o Appearance  o : well developed/well nourished patient in no apparent distress  · Respiratory  o Inspection of Chest  o : port present with no evidence of erythema or drainage  o Auscultation of Lungs  o : equal breaths bilaterally          Assessment  · Port-A-Cath in place     V45.89/Z95.828      Plan  · Orders  o [facility] Pre-Op Covid-19 Screening (58854) - V45.89/Z95.828 - 02/08/2021  o PowerPort/Port Removal (91783) - V45.89/Z95.828 - 02/12/2021  · Medications  o Medications have been Reconciled  o Transition of Care or Provider Policy  · Instructions  o [facility]  o Handouts Provided  o PLAN: Proceed with a port removal. Patient understands risks/benefits and is willing to proceed. Understands risk include, but are not limited to, bleeding,infection, damage to surrounding structures.  o ****Surgical Orders****  o RISK AND BENEFITS:  o Consent for surgery: Given these options, the patient has verbally expressed an understanding of the risks of surgery and finds these risks acceptable. We will proceed with surgery as soon as possible.  o PREP: Per protocol  o IV: Per Anesthesia  o Please sign permit for: Port [place]  o The above History and Physical Examination has been completed within 30 days of admission.  o ****Patient Status****  o Kefzol 2 gm iv preop  o Outpatient            Electronically Signed by: Selena Dempsey MD -Author on January 27, 2021 09:59:57 AM

## 2021-05-13 ENCOUNTER — HOSPITAL ENCOUNTER (OUTPATIENT)
Dept: RADIATION ONCOLOGY | Facility: HOSPITAL | Age: 76
Discharge: HOME | End: 2021-05-13
Attending: NURSE PRACTITIONER

## 2021-05-14 VITALS — WEIGHT: 233 LBS | BODY MASS INDEX: 37.45 KG/M2 | RESPIRATION RATE: 15 BRPM | HEIGHT: 66 IN

## 2021-05-14 VITALS
WEIGHT: 237 LBS | HEART RATE: 80 BPM | SYSTOLIC BLOOD PRESSURE: 136 MMHG | BODY MASS INDEX: 38.09 KG/M2 | HEIGHT: 66 IN | DIASTOLIC BLOOD PRESSURE: 92 MMHG

## 2021-05-14 VITALS — HEIGHT: 66 IN | BODY MASS INDEX: 31.34 KG/M2 | WEIGHT: 195 LBS | RESPIRATION RATE: 16 BRPM

## 2021-05-14 VITALS
HEART RATE: 70 BPM | HEIGHT: 66 IN | BODY MASS INDEX: 31.5 KG/M2 | DIASTOLIC BLOOD PRESSURE: 66 MMHG | SYSTOLIC BLOOD PRESSURE: 118 MMHG | WEIGHT: 196 LBS

## 2021-05-14 VITALS
SYSTOLIC BLOOD PRESSURE: 116 MMHG | HEART RATE: 76 BPM | WEIGHT: 201 LBS | BODY MASS INDEX: 32.3 KG/M2 | DIASTOLIC BLOOD PRESSURE: 54 MMHG | HEIGHT: 66 IN

## 2021-05-14 VITALS — BODY MASS INDEX: 32.3 KG/M2 | HEIGHT: 66 IN | RESPIRATION RATE: 16 BRPM | WEIGHT: 201 LBS

## 2021-05-14 NOTE — PROGRESS NOTES
Progress Note      Patient Name: Yareli Phillips   Patient ID: [id number]   Sex: Female   YOB: 1945    Primary Care Provider: Evangelina HOLLEY    Visit Date: January 25, 2021    Provider: Amos Vazquez MD   Location: [facility]   Location Address: [address] [place] [address], [facility]   [place]  [id number]   Location Phone: [phone]          Chief Complaint     Swelling in her feet.   Passing out episode after chemotherapy.  Shortness of breath.       History Of Present Illness  Yareli Phillips is a 75 year old /White female with breast cancer who is undergoing chemotherapy and had a spell of passing out after chemotherapy. Her chemotherapy has been discontinued because of decreasing LV function and side effects from chemotherapy. She is going to be started on anastrozole starting tonight. She states that her shortness of breath is moderate and occurs with mild to moderate exertion and intermittently she will have pedal edema. Her blood pressure is running on the low side.   PAST MEDICAL HISTORY: Chronic atrial fibrillation; chronic congestive heart failure with reduced ejection fraction; hypertension; osteoarthritis; breast cancer.   PSYCHOSOCIAL HISTORY: Denies alcohol and tobacco use.   CURRENT MEDICATIONS: Gabapentin 300 mg daily; Eliquis 5 mg b.i.d.; carvedilol 25 mg b.i.d.; furosemide 40 mg daily; spironolactone 25 mg daily; losartan 25 mg q.h.s.; anastrozole 1 mg daily; Tylenol Arthritis two tablets b.i.d. The dosage and frequency of the medications were reviewed with the patient.      ALLERGIES: No known drug allergies.       Review of Systems  · Cardiovascular  o Admits  o : swelling (feet, ankles, hands), shortness of breath while walking or lying flat  o Denies  o : palpitations (fast, fluttering, or skipping beats), chest pain or angina pectoris   · Respiratory  o Denies  o : chronic or frequent cough      Vitals  Date Time BP Position Site L\R Cuff Size HR RR TEMP (F) WT   "HT  BMI kg/m2 BSA m2 O2 Sat FR L/min FiO2 HC       01/25/2021 01:35 /54 Sitting    76 - R   201lbs 0oz 5'  6\" 32.44 2.06             Physical Examination  · Constitutional  o Appearance  o : Awake, alert, in no acute distress.  · Eyes  o Conjunctivae  o : Conjunctivae normal.  · Ears, Nose, Mouth and Throat  o Oral Cavity  o :   § Oral Mucosa  § : Normal.  · Neck  o Inspection/Palpation  o : No JVD. Good carotid upstroke. No bruits noted.   · Respiratory  o Respiratory  o : Clear to percussion and auscultation. Good respiratory effort.  · Cardiovascular  o Heart  o : PMI not well felt. S1, irregular. S2, normal.. No S3. No S4. 1/6 systolic murmur at the apex. Negative diastolic murmur.  o Peripheral Vascular System  o :   § Extremities  § : Good femoral pulses. Adequate pedal pulses. Trace to 1+ edema.  · Gastrointestinal  o Abdominal Examination  o : Soft. No masses or tenderness felt. No hepatosplenomegaly. Abdominal aorta is not palpable.  · Labs  o Labs  o : Chemistry panel is normal. BUN 31, creatinine 0.94. NT-proBNP has gone down to 732, which is an improvement.           Assessment     1.  Chronic combined congestive heart failure with reduced ejection fraction, class III.  2.  Chronic permanent atrial fibrillation, rate controlled.   3.  Hypertensive heart disease, currently running low blood pressure.          Plan     1.  Make spironolactone 25 mg b.i.d. and keep the furosemide 40 mg once a day.   2.  Reduce the losartan to 25 mg 1/2 tablet at bedtime instead of a whole tablet a day.   3.  Two weeks of blood pressure log starting a week from today.   4.  Obtain BMP and BNP in 1 month.   5.  Office visit in 6-8 weeks with a followup echocardiogram at that time.       Amos Vazquez MD, [facility]  PM/rt             Electronically Signed by: Fiona Onofre-, Other -Author on February 7, 2021 04:12:23 PM  Electronically Co-signed by: Amos Vazquez MD -Reviewer on February 14, 2021 " 09:56:39 PM

## 2021-05-14 NOTE — PROGRESS NOTES
"   Progress Note      Patient Name: Yareli Phillips   Patient ID: [id number]   Sex: Female   YOB: 1945    Primary Care Provider: Evangelina HOLLEY    Visit Date: September 28, 2020    Provider: Amos Vazquez MD   Location: [facility]   Location Address: [address] [place] [address], [address]   [place]  [id number]   Location Phone: [phone]          Chief Complaint  · Pedal edema with episodes of shortness of breath.      History Of Present Illness  Yareli Phillips is a 75 year old /White female who continues to complain of pedal edema. She states it started with her chemotherapy for breast cancer, and diuretics have not seemed to help. She has intermittent shortness of breath, mostly when there is humidity but that does not seem to be any worse. She denies any chest pain. No palpitations, dizziness, syncope, PND, or orthopnea.   PAST MEDICAL HISTORY: Chronic atrial fibrillation; Chronic diastolic heart failure; Hypertension; Osteoarthritis.   PSYCHOSOCIAL HISTORY: Denies alcohol use.   CURRENT MEDICATIONS: Carvedilol 25 mg b.i.d.; spironolactone 25 mg daily; furosemide 40 mg daily; gabapentin 300 mg b.i.d.; meloxicam 15 mg 1/2 pill p.r.n. and she said none in a long time; Eliquis 5 mg b.i.d.; Tylenol 650 mg p.r.n.       Review of Systems  · Cardiovascular  o Admits  o : swelling (feet, ankles, hands), shortness of breath while walking or lying flat  o Denies  o : palpitations (fast, fluttering, or skipping beats), chest pain or angina pectoris   · Respiratory  o Denies  o : chronic or frequent cough, asthma or wheezing      Vitals  Date Time BP Position Site L\R Cuff Size HR RR TEMP (F) WT  HT  BMI kg/m2 BSA m2 O2 Sat FR L/min FiO2 HC       09/28/2020 11:07 /92 Sitting    80 - R   237lbs 0oz 5'  6\" 38.25 2.24             Physical Examination  · Constitutional  o Appearance  o : Awake, alert, obese female, in no acute distress.   · Eyes  o Conjunctivae  o : Conjunctivae " normal.  · Ears, Nose, Mouth and Throat  o Oral Cavity  o :   § Oral Mucosa  § : Normal.  · Neck  o Jugular Veins  o : No JVD. Good carotid upstroke. No bruits noted.  · Respiratory  o Respiratory  o : Good respiratory effort. Clear to percussion and auscultation.  · Cardiovascular  o Heart  o : PMI not well felt. S1, irregular. S2, normal.. No S3. No S4. 1/6 systolic murmur at the apex. Negative diastolic murmur.   o Peripheral Vascular System  o :   § Extremities  § : Good femoral pulses. Adequate pedal pulses. Trace to +1 edema of lower extremities.  · Gastrointestinal  o Abdominal Examination  o : Soft. No tenderness or masses felt. No hepatosplenomegaly. Abdominal aorta is not palpable.  · EKG  o EKG  o : Performed in the office today.  o Indications  o : Atrial arrhythmia.  o Results  o : She is in Afib at a rate of 80. Cannot exclude anteroinferior MI. Prolonged QT interval.  o Comparison  o : Unchanged compared to the EKG of August 2020.   · Labs  o Labs  o : BNP 2100, which is slightly up for her. BUN 17, creatinine 1.4.   · Diagnostic Testing  o Diagnostic Testing  o : VELE 08/13/2020 was reviewed as negative for DVT. She does have some left saphenofemoral incompetence/reflux.           Assessment     1.  Pedal edema.  2.  Shortness of breath, intermittent.  3.  Atrial fibrillation, chronic, rate controlled.    4.  Chronic diastolic heart failure, class II-III.  5.  Hypertension, fair control.  6.  Breast cancer, currently doing radiation therapy and due to start another round of chemotherapy.       Plan     1.  Increase Lasix to 40 mg b.i.d.  2.  Check a BMP and BrNP in 2 weeks.  3.  Follow up in 4 months with labs or earlier if needed.        KISHAN Vance/Amos Vazquez MD, FACC  JF:PM:vm               Electronically Signed by: Lisette Cherry-, Other -Author on October 1, 2020 09:20:51 AM  Electronically Co-signed by: KISHAN Barroso -Reviewer on October 1, 2020 09:54:58  AM  Electronically Co-signed by: Amos Vazquez MD -Reviewer on October 1, 2020 06:56:03 PM

## 2021-05-14 NOTE — PROGRESS NOTES
Progress Note      Patient Name: Yareli Phillpis   Patient ID: [id number]   Sex: Female   YOB: 1945    Primary Care Provider: Evangelina HOLLEY   Referring Provider: Evangelina HOLLEY    Visit Date: November 11, 2019    Provider: KISAHN Barroso   Location: [facility]   Location Address: [address] [place] [address], [facility]   [place]  [id number]   Location Phone: [phone]          Chief Complaint  · Pedal edema       History Of Present Illness  REFERRING PROVIDER: Evangelina HOLLEY   Yareli Phillips is a 74-year-old /White female who states her pedal edema is better since adding the HCTZ. She denies any chest pain or pressure. No palpitations. She does have shortness of breath sometimes when she walks up the stairs; it is mild and long term for her. She denies any dizziness, syncope, PND or orthopnea. Cardiac-wise she is feeing better. She lost 4 pounds since her last visit.   PAST MEDICAL HISTORY: Positive for chronic atrial fibrillation, chronic diastolic heart failure, hypertension and osteoarthritis.   FAMILY HISTORY: Positive for diabetes, hypertension and heart disease.   PSYCHO/SOCIAL HISTORY: Positive for mood changes and depression. She never used alcohol or tobacco.   CURRENT MEDICATIONS: include Eliquis 5 mg b.i.d.; Amlodipine 2.5 mg daily; Lisinopril/HCT 10/12.5 mg daily; Gabapentin 300 mg b.i.d.; HCTZ 12.5 mg 5 days a week; Metoprolol ER 50 mg 1-1/2 tablets daily. The dosage and frequency of the medications were reviewed with the patient.       Review of Systems  · Cardiovascular  o Admits  o : swelling (feet, ankles, hands)  o Denies  o : palpitations (fast, fluttering, or skipping beats), shortness of breath while walking or lying flat, chest pain or angina pectoris   · Respiratory  o Denies  o : chronic or frequent cough, asthma or wheezing      Vitals  Date Time BP Position Site L\R Cuff Size HR RR TEMP (F) WT  HT  BMI kg/m2 BSA m2 O2 Sat HC      "  11/11/2019 11:01 /84 Sitting    92 - R   226lbs 0oz 5'  6\" 36.48 2.18           Physical Examination  · Constitutional  o Appearance  o : Awake, alert, in no acute distress, obese female.  · Eyes  o Conjunctivae  o : Conjunctivae normal.  · Ears, Nose, Mouth and Throat  o Oral Cavity  o :   § Oral Mucosa  § : Normal  · Neck  o Thyroid  o : No thyromegaly.  o Jugular Veins  o : No JVD. Good carotid upstroke. No bruits noted.  · Respiratory  o Respiratory  o : Clear to percussion and auscultation. Good respiratory effort.  · Cardiovascular  o Heart  o : PMI is not well felt. S1 irregular. S2 normal.. No S3. No S4. There is a 1/6 systolic murmur at the apex. Negative diastolic murmurs.  o Peripheral Vascular System  o :   § Extremities  § : Good femoral pulses, good pedal pulses, no pedal edema  · Gastrointestinal  o Abdominal Examination  o : Soft, obese. No masses or tenderness felt. No hepatosplenomegaly. Abdominal aorta not palpable.     Labs:  , TRG 78, HDL 64, LDL 80 without any statins.  Potassium was 4.1.           Assessment     1.  Pedal edema improving.  2.  Chronic atrial fibrillation.  3.  Chronic diastolic heart failure, Stage II, stable.  4.  Obesity.  5.  Shortness of breath stable.       Plan     1.  Continue current medications.  2.  Will follow up in 6 months or earlier if needed.    Gregoria roland/belkis           This note was transcribed by Izzy George.  belkis/deonte  The above service was transcribed by Izzy George, and I attest to the accuracy of the note.  DEONTE                 Electronically Signed by: Izzy George-, -Author on November 14, 2019 06:58:42 AM  Electronically Co-signed by: KISHAN Barroso -Reviewer on November 14, 2019 09:45:29 AM  "

## 2021-05-14 NOTE — PROGRESS NOTES
Progress Note      Patient Name: Yareli Phillips   Patient ID: [id number]   Sex: Female   YOB: 1945    Primary Care Provider: Evangelina HOLLEY   Referring Provider: Evangelina HOLLEY    Visit Date: May 27, 2020    Provider: Amos Vazquez MD   Location: [facility]   Location Address: [address] [place] [address], [address]   GINGER Foreman  [id number]   Location Phone: [phone]          Chief Complaint  · Follow-up of shortness of breath   · Breast cancer treatment      History Of Present Illness  REFERRING PROVIDER: Evangelina HOLLEY and Jocelyn Camp MD   Yareli Phillips is a 74-year-old female with a history of chronic permanent atrial fibrillation and hypertension, who has had moderate shortness of breath on mild to moderate exertion. She has been found to have breast cancer and is undergoing chemotherapy and may need surgery. Dr. Camp, the oncologist, called me to adjust her medications. Her blood pressure has been running on the low side. She denies chest pain, dizziness or syncope.   PAST MEDICAL HISTORY: Positive for chronic atrial fibrillation, chronic diastolic heart failure, hypertension and osteoarthritis.   FAMILY HISTORY: Positive for diabetes, hypertension and heart disease.   PSYCHO/SOCIAL HISTORY: No history of mood changes or depression. She never used alcohol or tobacco.   CURRENT MEDICATIONS: include Metoprolol ER 50 mg 1-1/2 tablets at night; Gabapentin 300 mg b.i.d; Lisinopril/HCT 10/12.5 mg 2 tablets daily; Eliquis 5 mg b.i.d. Her Amlodipine and extra HCTZ have been on hold. The dosage and frequency of the medications were reviewed with the patient.       Review of Systems  · Cardiovascular  o Admits  o : palpitations (fast, fluttering, or skipping beats), swelling (feet, ankles, hands), shortness of breath while walking or lying flat  o Denies  o : chest pain or angina pectoris   · Respiratory  o Denies  o : chronic or frequent cough, asthma or  "wheezing      Vitals  Date Time BP Position Site L\R Cuff Size HR RR TEMP (F) WT  HT  BMI kg/m2 BSA m2 O2 Sat HC       05/27/2020 12:55 /60 Sitting    99 - R   229lbs 0oz 5'  6\" 36.96 2.2           Physical Examination  · Constitutional  o Appearance  o : Awake, alert, in no acute distress, obese female.  · Eyes  o Conjunctivae  o : Conjunctivae normal.  · Ears, Nose, Mouth and Throat  o Oral Cavity  o :   § Oral Mucosa  § : Normal  · Neck  o Thyroid  o : No thyromegaly.  o Jugular Veins  o : No JVD. Good carotid upstroke. No bruits noted.  · Respiratory  o Respiratory  o : Clear to percussion and auscultation. Good respiratory effort.  · Cardiovascular  o Heart  o : PMI is not well felt. S1 irregular. S2 normal.. No S3. No S4. There is a 1/6 systolic murmur at the apex. Negative diastolic murmurs.  o Peripheral Vascular System  o :   § Extremities  § : Good femoral pulses, good pedal pulses, no pedal edema  · Gastrointestinal  o Abdominal Examination  o : Soft, obese. No masses or tenderness felt. No hepatosplenomegaly. Abdominal aorta not palpable.     EKG was performed in the office today.  Indication:       atrial fibrillation.  Results:          atrial fibrillation with moderate ventricular response of 92 beats per minute, PVCs, incomplete                        left bundle branch block.  Comparison:    Compared to the previous EKG, the heart rate is slightly faster.    Her most recent CBC:  Hemoglobin 10.8, slightly on the low side; normal white count; normal platelets.  Chemistry profile is normal, except for slightly low potassium of 3.4.    Recent echocardiogram done at [facility] on 05/18/2020 revealed mildly reduced left ventricular ejection fraction 45-50% with mild left ventricular enlargement and bi-atrial enlargement with mild mitral valve regurgitation.           Assessment     1.  Chronic permanent atrial fibrillation, with moderately rapid ventricular response.  2.  Hypertensive " heart disease, with slight decrease in left ventricular ejection fraction.  3.  Chronic diastolic heart failure, Class II to III.  4.  Breast cancer, underdoing chemotherapy with potential cardiotoxic drugs.       Plan     1.  Discontinue the Metoprolol ER.  2.  Start Carvedilol 12.5 mg b.i.d.  3.  Reduce the Lisinopril/HCT to the following schedule:  If her systolic is 120 or higher, she will take 1-1/2 of       the Lisinopril/HCT, and if it is less than 120, she will take only 1.    4.  She will get a CMP and BrNP in 2 weeks and office visit in August with an echocardiogram at that time to       assess left ventricular function.  5.  She will maintain a blood pressure log for 2 weeks starting Sunday.  6.  Further management decisions contingent upon the response to the above changes.    Amos Vazquez M.D., [facility]  pmm/dmd           This note was transcribed by Izzy George.  dmd/pmm  The above service was transcribed by Izzy George, and I attest to the accuracy of the note.  PMM               Electronically Signed by: Izzy George-, -Author on June 4, 2020 06:02:30 AM  Electronically Co-signed by: Amos Vazquez MD -Reviewer on June 20, 2020 10:02:28 PM

## 2021-05-14 NOTE — PROGRESS NOTES
"   Progress Note      Patient Name: Yareli Phillips   Patient ID: [id number]   Sex: Female   YOB: 1945    Primary Care Provider: Evangelina HOLLEY    Visit Date: March 15, 2021    Provider: Amos Vazquez MD   Location: [facility]   Location Address: [address] [place] [address], [address]   GINGER Foreman  [id number]   Location Phone: [phone]          Chief Complaint     Followup of CHF.       History Of Present Illness  REFERRING PROVIDER: Evangelina HOLLEY   Yareli Phillips is a 75 year old /White female undergoing chemotherapy for her breast cancer who had some changes in her medication last visit. She is feeling a lot better. Her shortness of breath is present, but improved. She denies chest pain, palpitations, dizziness, or syncope.   PAST MEDICAL HISTORY: Breast cancer; Chronic atrial fibrillation; Chronic combined congestive heart failure with reduced ejection fraction; Hypertension; Osteoarthritis.   PSYCHOSOCIAL HISTORY: Denies alcohol or tobacco use.   CURRENT MEDICATIONS: Eliquis 5 mg b.i.d.; carvedilol 25 mg b.i.d.; spironolactone 25 mg b.i.d.; losartan 25 mg 1/2 tablet at night; gabapentin 300 mg daily; anastrozole 1 mg daily; furosemide 40 mg b.i.d.; Tylenol Arthritis, etc.      ALLERGIES: Demerol.       Review of Systems  · Cardiovascular  o Admits  o : swelling (feet, ankles, hands), shortness of breath while walking or lying flat  o Denies  o : palpitations (fast, fluttering, or skipping beats), chest pain or angina pectoris   · Respiratory  o Denies  o : chronic or frequent cough      Vitals  Date Time BP Position Site L\R Cuff Size HR RR TEMP (F) WT  HT  BMI kg/m2 BSA m2 O2 Sat FR L/min FiO2 HC       03/15/2021 12:51 /66 Sitting    70 - R   195lbs 16oz 5'  6\" 31.63 2.03             Physical Examination  · Constitutional  o Appearance  o : Awake, alert, in no acute distress.  · Respiratory  o Respiratory  o : Clear to percussion and auscultation. Good respiratory " effort.  · Cardiovascular  o Heart  o : PMI not well felt. S1, irregular. S2, normal. No S3. No S4. 1/6 systolic murmur at the apex. Negative diastolic murmur.   o Peripheral Vascular System  o :   § Extremities  § : Good femoral pulses. Adequate pedal pulses. Trace to 1+ edema.  · Gastrointestinal  o Abdominal Examination  o : Soft. No masses or tenderness felt. No hepatosplenomegaly. Abdominal aorta is not palpable.  · Labs  o Labs  o : CBC is normal, except for a slightly low hemoglobin of 10.3. Chemistry panel is normal. HDL 37, LDL 60, triglycerides 43. NT-proBNP was 1050.  · Echocardiogram  o Echocardiogram  o : Echocardiogram results were reviewed with the patient. Her LV function is slightly better compared to November 2020.          Assessment     1.  Congestive heart failure, chronic combined with mild reduction in left ventricular systolic function, improved.  2.  Hypertension, controlled.  3.  Chronic atrial fibrillation with rate control.  4.  Hyperlipidemia, at goal with dietary management.       Plan     1.  She will continue the losartan 25 mg half-a-tablet at bedtime.  2.  She will continue the carvedilol, Eliquis, and spironolactone at their current dosage.  3.  Encouraged her to walk as much as possible.  4.  Follow up in 6-8 months with CBC, chemistry panel, CMP, proBNP, and thyroid panel just before her next        visit.        Amos Vazquez MD, [facility]  PM:vm             Electronically Signed by: Lisette Cherry-, Other -Author on March 20, 2021 10:28:25 AM  Electronically Co-signed by: Amos Vazquez MD -Reviewer on March 22, 2021 07:47:14 AM

## 2021-05-14 NOTE — PROGRESS NOTES
Progress Note      Patient Name: Yareli Phillips   Patient ID: [id number]   Sex: Female   YOB: 1945    Primary Care Provider: Evangelina HOLLEY   Referring Provider: Evangelina HOLLEY    Visit Date: August 5, 2020    Provider: Amos Vazquez MD   Location: [facility]   Location Address: [address] [place] [address], [address]   GINGER Foreman  [id number]   Location Phone: [phone]          Chief Complaint  · Leg pain      History Of Present Illness  REFERRING PROVIDER: Evangelina HOLLEY   Yareli Phillips is a 75-year-old white female who complains of pain in her lower legs. She said it started when she was on chemotherapy, but she is off the chemotherapy now and continues to complain of swelling. Now she is having pain in the back of her right leg. She was given Lasix 20 mg for the last five days with no improvement. She gets short of breath. It is mild to moderate with moderate exertion. That is slightly worse. Denies any chest pain, palpitations, dizziness, syncope, PND, or orthopnea. She states her blood pressures are not low when she is at home, but does not have any readings for us. She is anticipating having surgery for her breast cancer in the near future. She had bleeding hemorrhoids when she was on chemotherapy but that has resolved now.   PAST MEDICAL HISTORY: Chronic atrial fibrillation; chronic diastolic heart failure; hypertension; osteoarthritis.   FAMILY HISTORY: Positive for diabetes, hypertension, and heart disease.   PSYCHOSOCIAL HISTORY: No history of mood change or depression. She does not drink alcohol and does not use tobacco.   CURRENT MEDICATIONS: include Eliquis 5 mg b.i.d.; Lisinopril/HCTZ 20/25 mg daily; Carvedilol 12.5 mg b.i.d.; Lasix 20 mg which she has just completed; Gabapentin 300 mg b.i.d.; potassium 10 mEq b.i.d.; Prochlorperazine 10 mg p.r.n.; Meloxicam 15 mg 1/2 tablet maybe once a week. The dosage and frequency of the medications were reviewed with  "the patient.       Review of Systems  · Cardiovascular  o Admits  o : swelling (feet, ankles, hands), shortness of breath while walking or lying flat  o Denies  o : palpitations (fast, fluttering, or skipping beats), chest pain or angina pectoris   · Respiratory  o Admits  o : asthma or wheezing  o Denies  o : chronic or frequent cough      Vitals  Date Time BP Position Site L\R Cuff Size HR RR TEMP (F) WT  HT  BMI kg/m2 BSA m2 O2 Sat        08/05/2020 10:20 AM 90/62 Sitting    80 - R   222lbs 16oz 5'  6\" 35.99 2.17           Physical Examination  · Constitutional  o Appearance  o : Awake, alert, in no acute distress, obese female, ambulates with a cane.   · Eyes  o Conjunctivae  o : Conjunctivae normal.  · Ears, Nose, Mouth and Throat  o Oral Cavity  o :   § Oral Mucosa  § : Normal.  · Neck  o Jugular Veins  o : No JVD. Good carotid upstroke. No bruits noted.  · Respiratory  o Respiratory  o : Good respiratory effort. Clear to percussion and auscultation.  · Cardiovascular  o Heart  o : PMI is not well felt. S1 irregular. S2 normal.. No S3. No S4. 1/6 systolic murmur at the apex. Negative diastolic murmur.  o Peripheral Vascular System  o :   § Extremities  § : Good femoral and adequate pedal pulses. Trace to +1 pedal edema in the lower legs with negative erythema on the right.  · Gastrointestinal  o Abdominal Examination  o : Soft. No tenderness or masses felt. No hepatosplenomegaly. Abdominal aorta is not palpable.  · EKG  o EKG  o : Reviewed.   o Indications  o : Atrial fibrillation.  o Results  o : In atrial fibrillation, rate of 77. Cannot rule out previous myocardial infarction, age indeterminate.   o Comparison  o : Essentially unchanged from EKG of May 2020.  · Labs  o Labs  o : On 07/30/2020, sodium 134, total cholesterol 132, triglyceride 133, LDL 62, HDL 43, BNP 1020. She had labs on 07/24/2020 where her hemoglobin was 9.2 with hematocrit of 293.     Echocardiogram was ordered and reviewed. "           Assessment     1.  Pedal edema.  2.  Shortness of breath.   3.  Chronic atrial fibrillation.   4.  Chronic diastolic heart failure, stage II.   5.  Mitral valve regurgitation.   6.  Shortness of breath.  7.  Anemia.       Plan     1.  Stop Lisinopril/HCTZ.   2.  Increase Carvedilol to 25 mg b.i.d.  3.  Start Lasix 40 mg once a day.   4.  Check a BMP, BRNP, and a CBC in two weeks.   5.  Will do a VELE to evaluate for possible DVT.  6.  Follow up in six weeks.       KISHAN Vance/Amos Vazquez MD, [facility]  DEONTE/KENNY/juliana    This note was transcribed by Evie Palafox. DEONTE/KENNY/juliana  The above service was transcribed by Evie Palafox on my behalf and I attest to the accuracy of the note.  DEONTE/PM             Electronically Signed by: Ekta Palafox-, Other -Author on August 6, 2020 01:29:12 PM  Electronically Co-signed by: KISHAN Barroso -Reviewer on August 7, 2020 09:38:14 AM  Electronically Co-signed by: Amos Vazquez MD -Reviewer on August 9, 2020 04:33:54 PM

## 2021-05-15 VITALS — RESPIRATION RATE: 16 BRPM | HEIGHT: 66 IN | BODY MASS INDEX: 35.84 KG/M2 | WEIGHT: 223 LBS

## 2021-05-15 VITALS — HEIGHT: 66 IN | WEIGHT: 238 LBS | BODY MASS INDEX: 38.25 KG/M2 | RESPIRATION RATE: 14 BRPM

## 2021-05-15 VITALS
WEIGHT: 223 LBS | SYSTOLIC BLOOD PRESSURE: 90 MMHG | DIASTOLIC BLOOD PRESSURE: 62 MMHG | HEIGHT: 66 IN | HEART RATE: 80 BPM | BODY MASS INDEX: 35.84 KG/M2

## 2021-05-15 VITALS
DIASTOLIC BLOOD PRESSURE: 80 MMHG | WEIGHT: 230 LBS | HEIGHT: 66 IN | BODY MASS INDEX: 36.96 KG/M2 | HEART RATE: 78 BPM | SYSTOLIC BLOOD PRESSURE: 134 MMHG

## 2021-05-15 VITALS
SYSTOLIC BLOOD PRESSURE: 105 MMHG | HEART RATE: 99 BPM | WEIGHT: 229 LBS | BODY MASS INDEX: 36.8 KG/M2 | HEIGHT: 66 IN | DIASTOLIC BLOOD PRESSURE: 60 MMHG

## 2021-05-15 VITALS
WEIGHT: 226 LBS | DIASTOLIC BLOOD PRESSURE: 84 MMHG | HEIGHT: 66 IN | HEART RATE: 92 BPM | SYSTOLIC BLOOD PRESSURE: 108 MMHG | BODY MASS INDEX: 36.32 KG/M2

## 2021-05-15 VITALS — WEIGHT: 238 LBS | RESPIRATION RATE: 16 BRPM | BODY MASS INDEX: 38.25 KG/M2 | HEIGHT: 66 IN

## 2021-05-16 VITALS
WEIGHT: 230 LBS | DIASTOLIC BLOOD PRESSURE: 84 MMHG | BODY MASS INDEX: 38.32 KG/M2 | HEART RATE: 80 BPM | SYSTOLIC BLOOD PRESSURE: 122 MMHG | HEIGHT: 65 IN

## 2021-05-16 VITALS
BODY MASS INDEX: 38.25 KG/M2 | DIASTOLIC BLOOD PRESSURE: 72 MMHG | WEIGHT: 238 LBS | HEART RATE: 72 BPM | SYSTOLIC BLOOD PRESSURE: 100 MMHG | HEIGHT: 66 IN

## 2021-05-28 VITALS
TEMPERATURE: 98 F | SYSTOLIC BLOOD PRESSURE: 117 MMHG | OXYGEN SATURATION: 99 % | WEIGHT: 230.6 LBS | BODY MASS INDEX: 35.12 KG/M2 | DIASTOLIC BLOOD PRESSURE: 83 MMHG | SYSTOLIC BLOOD PRESSURE: 109 MMHG | HEIGHT: 66 IN | SYSTOLIC BLOOD PRESSURE: 86 MMHG | BODY MASS INDEX: 37.75 KG/M2 | RESPIRATION RATE: 20 BRPM | RESPIRATION RATE: 18 BRPM | SYSTOLIC BLOOD PRESSURE: 140 MMHG | TEMPERATURE: 98.1 F | HEART RATE: 83 BPM | RESPIRATION RATE: 18 BRPM | HEART RATE: 85 BPM | HEART RATE: 80 BPM | DIASTOLIC BLOOD PRESSURE: 61 MMHG | SYSTOLIC BLOOD PRESSURE: 98 MMHG | BODY MASS INDEX: 38.25 KG/M2 | WEIGHT: 217.59 LBS | WEIGHT: 223.99 LBS | TEMPERATURE: 97.9 F | BODY MASS INDEX: 37.22 KG/M2 | RESPIRATION RATE: 18 BRPM | OXYGEN SATURATION: 97 % | TEMPERATURE: 96.6 F | DIASTOLIC BLOOD PRESSURE: 64 MMHG | BODY MASS INDEX: 36.29 KG/M2 | DIASTOLIC BLOOD PRESSURE: 67 MMHG | RESPIRATION RATE: 17 BRPM | SYSTOLIC BLOOD PRESSURE: 104 MMHG | HEART RATE: 78 BPM | OXYGEN SATURATION: 97 % | WEIGHT: 233.91 LBS | WEIGHT: 238 LBS | OXYGEN SATURATION: 96 % | OXYGEN SATURATION: 98 % | TEMPERATURE: 97.3 F | OXYGEN SATURATION: 99 % | BODY MASS INDEX: 36.15 KG/M2 | HEART RATE: 88 BPM | RESPIRATION RATE: 18 BRPM | DIASTOLIC BLOOD PRESSURE: 57 MMHG | TEMPERATURE: 97 F | WEIGHT: 224.87 LBS | DIASTOLIC BLOOD PRESSURE: 37 MMHG | HEART RATE: 128 BPM

## 2021-05-28 VITALS
OXYGEN SATURATION: 97 % | RESPIRATION RATE: 18 BRPM | DIASTOLIC BLOOD PRESSURE: 67 MMHG | TEMPERATURE: 97.6 F | HEART RATE: 76 BPM | BODY MASS INDEX: 31.49 KG/M2 | SYSTOLIC BLOOD PRESSURE: 117 MMHG | WEIGHT: 195.11 LBS

## 2021-05-28 VITALS
RESPIRATION RATE: 18 BRPM | WEIGHT: 216.05 LBS | BODY MASS INDEX: 35.94 KG/M2 | TEMPERATURE: 98.4 F | HEART RATE: 104 BPM | SYSTOLIC BLOOD PRESSURE: 94 MMHG | TEMPERATURE: 98.3 F | TEMPERATURE: 97.1 F | OXYGEN SATURATION: 97 % | SYSTOLIC BLOOD PRESSURE: 96 MMHG | WEIGHT: 203.93 LBS | OXYGEN SATURATION: 97 % | RESPIRATION RATE: 18 BRPM | WEIGHT: 231.26 LBS | WEIGHT: 193.34 LBS | RESPIRATION RATE: 20 BRPM | HEART RATE: 72 BPM | RESPIRATION RATE: 18 BRPM | WEIGHT: 222.66 LBS | DIASTOLIC BLOOD PRESSURE: 69 MMHG | SYSTOLIC BLOOD PRESSURE: 131 MMHG | DIASTOLIC BLOOD PRESSURE: 54 MMHG | BODY MASS INDEX: 31.21 KG/M2 | BODY MASS INDEX: 33.91 KG/M2 | DIASTOLIC BLOOD PRESSURE: 84 MMHG | TEMPERATURE: 96.7 F | HEART RATE: 76 BPM | SYSTOLIC BLOOD PRESSURE: 119 MMHG | DIASTOLIC BLOOD PRESSURE: 43 MMHG | TEMPERATURE: 98.1 F | WEIGHT: 227.07 LBS | OXYGEN SATURATION: 95 % | OXYGEN SATURATION: 99 % | SYSTOLIC BLOOD PRESSURE: 115 MMHG | DIASTOLIC BLOOD PRESSURE: 49 MMHG | SYSTOLIC BLOOD PRESSURE: 88 MMHG | DIASTOLIC BLOOD PRESSURE: 60 MMHG | TEMPERATURE: 97.8 F | TEMPERATURE: 96.5 F | OXYGEN SATURATION: 96 % | SYSTOLIC BLOOD PRESSURE: 113 MMHG | RESPIRATION RATE: 20 BRPM | RESPIRATION RATE: 20 BRPM | DIASTOLIC BLOOD PRESSURE: 52 MMHG | HEART RATE: 77 BPM | BODY MASS INDEX: 37.33 KG/M2 | OXYGEN SATURATION: 98 % | HEART RATE: 69 BPM | HEART RATE: 86 BPM | WEIGHT: 210.1 LBS | BODY MASS INDEX: 34.87 KG/M2 | HEART RATE: 94 BPM | RESPIRATION RATE: 24 BRPM | BODY MASS INDEX: 32.91 KG/M2 | OXYGEN SATURATION: 95 % | BODY MASS INDEX: 36.65 KG/M2

## 2021-05-28 NOTE — PROGRESS NOTES
Patient: YANDEL PHILLIPS     Acct: [id number]     Report: #[id number]  UNIT #: [id number]     : 1945    Encounter Date:2020  PRIMARY CARE: WILLIAM ROSS  ***Signed***  --------------------------------------------------------------------------------------------------------------------  NURSE INTAKE      Visit Type      Established Patient Visit            Chief Complaint      BREAST CANCER (HERE FOR MRI RESULTS)            Referring Provider/Copies To      Referring Provider:  ASHLIE DE LA TORRE MD      Primary Care Provider:  WILLIAM ROSS      Copies To:   WILLIAM ROSS            History and Present Illness      Past Oncology Illness History      Ms. Phillips is a 74-year-old female who was referred to me by Dr. Simon at     the patient was diagnosed with invasive ductal carcinoma of the left breast.            The breast mass was initially noted on screening mammogram.  Diagnostic mammo    gram and ultrasound confirmed a 2.7 x 2.5 cm mass at the 3 o'clock position.      She underwent biopsy on 2020.  Pathology was positive for invasive ductal     carcinoma, estrogen receptor positive (75%, moderate), progesterone receptor     positive (40%, moderate) HER-2 equivocal (2+ by IHC), HER-2 FISH positive, Ki-67    55%.            ECHO Global Hypokinesis 45-50%            Cycle 1 of neoadjuvant TCH on 2020 -AUC of 4      Cycle 2 of TCH on 20 (60mg/kg Taxol)       Cycle 3 on 2020      Cycle 4 on 2020 - further chemotherapy was halted due to patient     intolerance.            Breast MRI on 2020: IMPRESSION:      Decreased size of mass in the outer central left breast, consistent with     biopsy-proven malignancy.  Recommend surgical/oncologic management.             Amount of non mass enhancement surrounding the mass has decreased with a     persistent 5 mm focus of non mass enhancement 1 cm anterior to the mass.  If the    patient has breast conserving surgery, wide  anterior margin could be obtained or    MRI guided biopsy could be performed to evaluate for possible multifocal     disease.            HPI - Oncology Interim      Patient comes in today to discuss the plan for future treatment.  She was with     her sister-in-law.  Both describe how she had a terrible time the last few weeks    after her fourth cycle of chemotherapy.  She continues to not tolerate it well     despite dose reductions.  She has laid in bed for significant amount of time.      She also has some worsening swelling in her feet and increased pain.  She feels     he is more short of breath but this may be due to fatigue.            She has an appointment with her cardiologist next Wednesday.  He recently     doubled her lisinopril and plans to get an echo.            ECOG Performance Status      1            Most Recent Lab Findings      Laboratory Tests      20 08:51            20 08:55            PAST, FAMILY   Past Medical History      Past Medical History:  Heart Attack, Hypertension      Hematology/Oncology (F):  Breast Cancer            Past Surgical History      VAD Placement            Hernia repair            Family History            Father  from bone marrow cancer, paternal aunt with leukemia      Mother diagnosed with breast cancer at age 68      Sister diagnosed with colon cancer            Social History      Marital Status:        Lives independently:  No            Tobacco Use      Tobacco status:  Never smoker            Alcohol Use      Alcohol intake:  None            Substance Use      Substance use:  Denies use            REVIEW OF SYSTEMS      General:  Admits: Fatigue;          Denies: Appetite Change, Fever, Night Sweats, Weight Gain, Weight Loss      Other      FINGER NAIL CHANGES      Eye:  Denies Blurred Vision, Denies Corrective Lenses, Denies Diplopia, Denies     Vision Changes      ENT:  Denies Headache, Denies Hearing Loss, Denies Hoarseness, Denies  Sore     Throat      Cardiovascular:  Denies Chest Pain, Denies Palpitations      Other      SWELLING IN LEGS AND FEET, THIGHS - PAIN      Respiratory:  Admits: Shortness of Air;          Denies: Cough, Coughing Blood, Productive Cough, Wheezing      Gastrointestinal:  Denies Bloody Stools, Denies Constipation, Denies Diarrhea,     Denies Nausea/Vomiting, Denies Problem Swallowing, Denies Unable to Control     Bowels      Genitourinary:  Denies Blood in Urine, Denies Incontinence, Denies Painful     Urination      Musculoskeletal:  Denies Back Pain, Denies Muscle Pain, Denies Painful Joints      Integumentary:  Denies Itching, Denies Lesions, Denies Rash      Neurologic:  Denies Dizziness, Denies Numbness\Tingling, Denies Seizures      Psychiatric:  Denies Anxiety, Denies Depression      Endocrine:  Denies Cold Intolerance, Denies Heat Intolerance      Hematologic/Lymphatic:  Denies Bruising, Denies Bleeding, Denies Enlarged Lymph     Nodes      Reproductive:  Denies: Menopause, Heavy Periods, Pregnant, Still Menstruating            VITAL SIGNS AND SCORES      Vitals      Weight 223 lbs 15.798 oz / 101.6 kg      Temperature 96.6 F / 35.89 C - Temporal      Pulse 88      Respirations 18      Blood Pressure 104/61 Sitting, Right Arm      Pulse Oximetry 98%, ROOM AIR            Pain Score      Experiencing any pain?:  Yes (SWELLING IN LEGS AND FEET, THIGHS - PAIN)      Pain Scale Used:  Numerical      Pain Intensity:  9            Fatigue Score      Experiencing any fatigue?:  Yes      Fatigue (0-10 scale):  5            EXAM      General: Alert, cooperative, no acute distress, well-appearing for age and     condition      Eyes: Anicteric sclera, PERRLA      HEENT: Oropharynx clear, no exudates      Respiratory: CTAB, normal respiratory effort      Abdomen: Normal active bowel sounds, no tenderness, no distention      Cardiovascular: RRR, no murmur, trace peripheral edema      Skin: Normal tone, no rash, no lesions       Psychiatric: Appropriate affect, intact judgment      Neurologic: No focal sensory or motor deficits, no weakness, numbness, dizziness      Musculoskeletal: Normal muscle strength, normal muscle tone      Extremities: No clubbing, cyanosis, or deformities            PREVENTION      Hx Influenza Vaccination:  Yes      Date Influenza Vaccine Given:  Oct 2, 2019      Influenza Vaccine Declined:  No      2 or More Falls in Past Year?:  No      Fall Past Year with Injury?:  No      Hx Pneumococcal Vaccination:  Yes      Encouraged to follow-up with:  PCP regarding preventative exams.      Chart initiated by      NITISH COLEMAN MA            ALLERGY/MEDS      Allergies      Coded Allergies:             MEPERIDINE (Verified  Allergy, Unknown, HIVES , 7/31/20)           MORPHINE (Verified  Allergy, Unknown, NAUSEA /PASSED OUT, 7/31/20)            Medications      Last Reconciled on 8/9/20 21:58 by ARABLELA FISH      Furosemide (Furosemide) 20 Mg Tablet      20 MG PO QDAY for 5 Days, #5 TAB 0 Refills         Prov: ARABELLA FISH         7/31/20       Lisinopril-HCTZ 20-25 Mg (Lisinopril-HCTZ 20-25 Mg) 1 Each Tablet      1 TAB PO QDAY, #30 TAB 0 Refills         Reported         7/31/20       Carvedilol (Carvedilol) 12.5 Mg Tablet      12.5 MG PO BID, #60 TAB 0 Refills         Reported         7/2/20       Dexamethasone (Decadron) 4 Mg Tablet      8 MG PO QDAY, #6 TAB 4 Refills         Prov: ARABELLA FISH         6/12/20       Potassium Chloride (K-Dur*) 10 Meq Tab.er.prt      20 MEQ PO QDAY, #60 TAB 1 Refill         Prov: ARABELLA FISH         6/12/20       Multivitamin/Iron/Folic Acid (CENTRUM COMPLETE MULTIVIT TAB) Unknown Strength     Tablet      PO QDAY, #30 TAB 0 Refills         Reported         5/1/20       Prochlorperazine Maleate (Prochlorperazine Maleate) 10 Mg Tab      10 MG PO Q6H PRN for NAUSEA AND/OR VOMITING, #60 TAB 3 Refills         Prov: ARABELLA FISH         4/24/20       Ondansetron Odt (ONDANSETRON  ODT) 8 Mg Tab.rapdis      8 MG PO Q8H PRN for NAUSEA, #30 TAB.RAPDIS 4 Refills         Prov: ARABELLA FISH         4/24/20       HYDROcodone-Acetaminophen 5-325 Mg (HYDROcodone-Acetaminophen 5-325 Mg) 1 Each     Tablet      2 TAB PO Q4H PRN for PAIN, #20 TAB         Prov: ASHLIE DE LA TORRE MD         4/21/20       Calcium Citrate (Citracal) 200 Mg Tablet      200 MG PO QDAY for 30 Days, #30 TAB         Reported         4/10/20       Glucosamine/Msm/Chondroitin A (Condrolite) 1 Tab Tablet      1 TAB PO QDAY for 30 Days, #30 TAB         Reported         4/10/20       Metoprolol Succinate (Metoprolol Succinate) 50 Mg Tab.er.24h      75 MG PO HS, #30 TAB.SR.24H 0 Refills         Reported         4/10/20       Gabapentin (Gabapentin) 300 Mg Capsule      300 MG PO HS, #30 CAP 0 Refills         Reported         4/10/20       amLODIPine (amLODIPine) 2.5 Mg Tablet      2.5 MG PO HS, #30 TAB 0 Refills         Reported         4/9/20       Hctz (hydroCHLOROthiazide) 12.5 Mg Capsule      12.5 MG PO QDAY, #30 CAP 0 Refills         Reported         4/9/20       Apixaban (Eliquis) 5 Mg Tablet      5 MG PO BID for 30 Days, #60 TAB         Reported         4/9/20      Medications Reviewed:  Changes made to meds            IMPRESSION/PLAN      Impression      Ms. Phillips is a 75-year-old female with HER-2 positive/hormone receptor positive     breast cancer.            Diagnosis      Notes      New Medications      * Lisinopril-HCTZ 20-25 Mg 1 EACH TABLET: 1 TAB PO QDAY #30      * Furosemide 20 MG TABLET: 20 MG PO QDAY 5 Days #5            Plan      HER-2 positive/hormone receptor positive breast cancer: She completed 4 cycles     of TCH with significant dose reductions but was unable to tolerate further     chemotherapy.  Breast MRI shows slight improvement in the breast mass.  I     recommend the patient proceed to surgical resection with Dr. Simon.  She     will follow-up with me 4 weeks after surgery to discuss further  treatment plan.     She will have an echo next Wednesday at her cardiologist office.  If this is     done then she does not need the echo which was ordered.            Patient Education      Patient Education Provided:  Yes            Electronically signed by ARABELLA FISH  08/09/2020 21:58       Disclaimer: Converted document may not contain table formatting or lab diagrams. Please see [employer] for the authenticated document.

## 2021-05-28 NOTE — PROGRESS NOTES
Patient: YANDEL PHILLIPS     Acct: [id number]     Report: #[id number]  UNIT #: [id number]     : 1945    Encounter Date:2020  PRIMARY CARE: WILLIAM ROSS  ***Signed***  --------------------------------------------------------------------------------------------------------------------  NURSE INTAKE      Visit Type      Established Patient Visit            Chief Complaint      BREAST CANCER; HERE FOR [place]            Referring Provider/Copies To      Referring Provider:  ASHLIE DE LA TORRE MD      Primary Care Provider:  WILLIAM ROSS      Copies To:   WILLIAM ROSS            History and Present Illness      Past Oncology Illness History      Ms. Phillips is a 74-year-old female who was referred to me by Dr. Simon at     the patient was diagnosed with invasive ductal carcinoma of the left breast.            The breast mass was initially noted on screening mammogram.  Diagnostic     mammogram and ultrasound confirmed a 2.7 x 2.5 cm mass at the 3 o'clock     position.  She underwent biopsy on 2020.  Pathology was positive for     invasive ductal carcinoma, estrogen receptor positive (75%, moderate), p    rogesterone receptor positive (40%, moderate) HER-2 equivocal (2+ by IHC), HER-2    FISH positive, Ki-67 55%.            ECHO Global Hypokinesis 45-50%            Cycle 1 of neoadjuvant TCH on 2020 -AUC of 4      Cycle 2 of TCH on 20 (60mg/kg Taxol)       Cycle 3 on 2020      Cycle 4 on 2020            HPI - Oncology Interim      Patient comes in for her fourth cycle of TCH chemotherapy prior to surgery.  She    feels that she is tolerating chemotherapy worse every time.  She is very fatigu    ed.  She has a constant dull headache now.  She also feels that she gets short     of breath and fatigued easily with any activity.  She no longer wants to go to     [facility] because she feels that it would be difficult for her to walk around.      She is lost an additional 5 pounds.  She  also feels that her A. fib is     continuous.  Her heart rate is anywhere from 48-1 32.  At her last cycle of     chemotherapy her gums were bleeding every time she pressure teeth.  This     resolved after about 4 days.  She also had about 4 days of constipation but now     has some diarrhea.  Fortunately she does not complain of any increased     neuropathy but she does asked that I refill her gabapentin which was previously     prescribed by her primary care provider.            ECOG Performance Status      1            Most Recent Lab Findings      Laboratory Tests      20 08:24            PAST, FAMILY   Past Medical History      Past Medical History:  Heart Attack, Hypertension      Hematology/Oncology (F):  Breast Cancer            Past Surgical History      VAD Placement            Hernia repair            Family History            Father  from bone marrow cancer, paternal aunt with leukemia      Mother diagnosed with breast cancer at age 68      Sister diagnosed with colon cancer            Social History      Marital Status:        Lives independently:  No            Tobacco Use      Tobacco status:  Never smoker            Alcohol Use      Alcohol intake:  None            Substance Use      Substance use:  Denies use            REVIEW OF SYSTEMS      General:  Admits: Appetite Change (FORCING SELF TO EAT), Fatigue, Weight Loss;          Denies: Fever, Night Sweats, Weight Gain      Eye:  Denies Blurred Vision, Denies Corrective Lenses, Denies Diplopia, Denies     Vision Changes      Other      BAD WATERY EYES      ENT:  Admits Headache; Denies Hearing Loss, Denies Hoarseness, Denies Sore     Throat      Cardiovascular:  Denies Chest Pain, Denies Palpitations      Respiratory:  Admits: Shortness of Air;          Denies: Cough, Coughing Blood, Productive Cough, Wheezing      Gastrointestinal:  Denies Bloody Stools, Denies Constipation, Denies Diarrhea,     Denies Nausea/Vomiting, Denies Problem  Swallowing, Denies Unable to Control     Bowels      Genitourinary:  Denies Blood in Urine, Denies Incontinence, Denies Painful     Urination      Musculoskeletal:  Denies Back Pain, Denies Muscle Pain; Admits Painful Joints     (HIPS ARE HURTING; DIFFICULT TO WALK)      Integumentary:  Denies Itching, Denies Lesions, Denies Rash      Other      HANDS PEELING AND FEET      Neurologic:  Denies Dizziness, Denies Numbness\Tingling, Denies Seizures      Psychiatric:  Denies Anxiety, Denies Depression      Endocrine:  Denies Cold Intolerance, Denies Heat Intolerance      Hematologic/Lymphatic:  Denies Bruising, Denies Bleeding, Denies Enlarged Lymph     Nodes      Reproductive:  Denies: Menopause, Heavy Periods, Pregnant, Still Menstruating            VITAL SIGNS AND SCORES      Vitals      Weight 224 lbs 13.907 oz / 102.0 kg      Temperature 98.0 F / 36.67 C - Temporal      Pulse 128      Respirations 20      Blood Pressure 109/67 Sitting      Pulse Oximetry 96%, ROOM AIR            Pain Score      Experiencing any pain?:  Yes      Pain Scale Used:  Numerical      Pain Intensity:  6            Fatigue Score      Experiencing any fatigue?:  Yes      Fatigue (0-10 scale):  8            EXAM      General: Alert, cooperative, no acute distress      Eyes: Anicteric sclera, PERRLA      HEENT: Oropharynx clear, no exudates, chemotherapy-induced hair loss      Respiratory: CTAB, normal respiratory effort      Abdomen: Normal active bowel sounds, no tenderness, no distention      Cardiovascular: RRR, no murmur, no peripheral edema      Skin: Normal tone, no rash, no lesions      Psychiatric: Appropriate affect, intact judgment      Neurologic: No focal sensory or motor deficits, no weakness, numbness, dizziness      Musculoskeletal: Normal muscle strength, normal muscle tone      Extremities: No clubbing, cyanosis, or deformities            PREVENTION      Hx Influenza Vaccination:  Yes      Date Influenza Vaccine Given:  Oct 2,  2019      Influenza Vaccine Declined:  No      2 or More Falls Past Year?:  No      Fall Past Year with Injury?:  No      Hx Pneumococcal Vaccination:  Yes      Encouraged to follow-up with:  PCP regarding preventative exams.      Chart initiated by      KYLAH SHERWOOD CMA            ALLERGY/MEDS      Allergies      Coded Allergies:             MEPERIDINE (Verified  Allergy, Unknown, HIVES , 7/2/20)           MORPHINE (Verified  Allergy, Unknown, NAUSEA /PASSED OUT, 7/2/20)            Medications      Last Reconciled on 7/2/20 18:52 by ARABELLA FISH      Carvedilol (Carvedilol) 12.5 Mg Tablet      12.5 MG PO BID, #60 TAB 0 Refills         Reported         7/2/20       Dexamethasone (Decadron) 4 Mg Tablet      8 MG PO QDAY, #6 TAB 4 Refills         Prov: ARABELLA FISH         6/12/20       Potassium Chloride (K-Dur*) 10 Meq Tab.er.prt      20 MEQ PO QDAY, #60 TAB 1 Refill         Prov: ARABELLA FISH         6/12/20       Multivitamin/Iron/Folic Acid (CENTRUM COMPLETE MULTIVIT TAB) Unknown Strength     Tablet      PO QDAY, #30 TAB 0 Refills         Reported         5/1/20       Prochlorperazine Maleate (Prochlorperazine Maleate) 10 Mg Tab      10 MG PO Q6H PRN for NAUSEA AND/OR VOMITING, #60 TAB 3 Refills         Prov: ARABELLA FISH         4/24/20       Ondansetron Odt (ONDANSETRON ODT) 8 Mg Tab.rapdis      8 MG PO Q8H PRN for NAUSEA, #30 TAB.RAPDIS 4 Refills         Prov: ARABELLA FISH         4/24/20       HYDROcodone-Acetaminophen 5-325 Mg (HYDROcodone-Acetaminophen 5-325 Mg) 1 Each     Tablet      2 TAB PO Q4H PRN for PAIN, #20 TAB         Prov: ASHLIE DE LA TORRE MD         4/21/20       Calcium Citrate (Citracal) 200 Mg Tablet      200 MG PO QDAY for 30 Days, #30 TAB         Reported         4/10/20       Glucosamine/Msm/Chondroitin A (Condrolite) 1 Tab Tablet      1 TAB PO QDAY for 30 Days, #30 TAB         Reported         4/10/20       Metoprolol Succinate (Metoprolol Succinate) 50 Mg Tab.er.24h      75 MG  PO HS, #30 TAB.SR.24H 0 Refills         Reported         4/10/20       Gabapentin (Gabapentin) 300 Mg Capsule      300 MG PO HS, #30 CAP 0 Refills         Reported         4/10/20       amLODIPine (amLODIPine) 2.5 Mg Tablet      2.5 MG PO HS, #30 TAB 0 Refills         Reported         4/9/20       Hctz (hydroCHLOROthiazide) 12.5 Mg Capsule      12.5 MG PO QDAY, #30 CAP 0 Refills         Reported         4/9/20       Lisinopril-HCTZ 10-12.5 Mg (Lisinopril-HCTZ 10-12.5 Mg) 1 Each Tablet      1-1.5 TAB PO QDAY, #30 TAB 0 Refills         Reported         4/9/20       Apixaban (Eliquis) 5 Mg Tablet      5 MG PO BID for 30 Days, #60 TAB         Reported         4/9/20      Medications Reviewed:  Changes made to meds            IMPRESSION/PLAN      Impression      Patient is here for her fourth cycle of neoadjuvant chemotherapy with TCH.            Diagnosis      Notes      New Medications      * Carvedilol 12.5 MG TABLET: 12.5 MG PO BID #60      Changed Medications      * Lisinopril-HCTZ 10-12.5 Mg 1 EACH TABLET: 1-1.5 TAB PO QDAY #30         Instructions: TAKE 1.5 TAB IF SYSTOLIC BP IS > 120, TAKE 1 TABLET IF SYSTOLIC       < 120      Discontinued Medications      * Lisinopril-HCTZ 20-25 Mg 1 EACH TABLET: 1 TAB PO QDAY #30            Plan      Hormone receptor positive/HER-2 positive breast cancer: Patient is here for her     fourth cycle of chemotherapy with TCH.  From the beginning her carboplatin was     reduced to an AUC of 4.  After her second cycle I reduced the dose of Taxol as     well to 60 mg per metered squared.  The patient continues to have difficulty     with fatigue and shortness of breath.  We discussed the plan of care going     forward.  The patient really wishes to continue with as much chemotherapy as she    can tolerate because she does not want any risk for this cancer coming back.  I     told her that we would proceed with this for cycle as planned.  The next 2     cycles may be done without  carboplatin if she continues to feel worse.            Chronic neuropathic pain: Patient reports her neuropathy is not significantly     worse.  She does request a refill on her gabapentin which she takes twice a day     at 300 mg.            Patient Education      Patient Education Provided:  Yes            Electronically signed by ARABELLA FISH  07/02/2020 18:52       Disclaimer: Converted document may not contain table formatting or lab diagrams. Please see [employer] for the authenticated document.

## 2021-05-28 NOTE — PROGRESS NOTES
Patient: YANDEL PHILLIPS     Acct: [id number]     Report: #[id number]  UNIT #: [id number]     : 1945    Encounter Date:2020  PRIMARY CARE: WILLIAM ROSS  ***Signed***  --------------------------------------------------------------------------------------------------------------------  NURSE INTAKE      Visit Type      Established Patient Visit            Chief Complaint      BREAST CANCER (HERE FOR [place])            Referring Provider/Copies To      Referring Provider:  ASHLIE DE LA TORRE MD      Primary Care Provider:  WILLIAM ROSS      Copies To:   WILLIAM ROSS            History and Present Illness      Past Oncology Illness History      Ms. Phillips is a 74-year-old female who was referred to me by Dr. Simon at     the patient was diagnosed with invasive ductal carcinoma of the left breast.            The breast mass was initially noted on screening mammogram.  Diagnostic     mammogram and ultrasound confirmed a 2.7 x 2.5 cm mass at the 3 o'clock     position.  She underwent biopsy on 2020.  Pathology was positive for     invasive ductal carcinoma, estrogen receptor positive (75%, moderate),     progesterone receptor positive (40%, moderate) HER-2 equivocal (2+ by IHC), HER-    2 FISH positive, Ki-67 55%.            ECHO Global Hypokinesis 45-50%            Cycle 1 of neoadjuvant TCH on 2020      Cycle 2 of TCH on 20 (60mg/kg Taxol)            HPI - Oncology Interim      Patient states that she has had severe foot pain following chemotherapy.  Feels     like she is walking on rocks.  She was started on gabapentin by her primary care    provider and it helped.  She is increased this from once a day to twice a day.      She also had severe diarrhea for few days following chemotherapy and still     continues to have some loose bowel movements daily.  She is not using Imodium.            I discussed the results of the echo with the patient.  We discussed the risks     and benefits  of proceeding with Herceptin therapy despite some cardiac     dysfunction.            ECOG Performance Status      1            Most Recent Lab Findings      Laboratory Tests      5/22/20 08:44            5/22/20 09:25            REVIEW OF SYSTEMS      General:  Admits: Fatigue;          Denies: Appetite Change, Fever, Night Sweats, Weight Gain, Weight Loss      Eye:  Denies Blurred Vision, Denies Corrective Lenses, Denies Diplopia, Denies     Vision Changes      ENT:  Denies Headache, Denies Hearing Loss, Denies Hoarseness, Denies Sore     Throat      Cardiovascular:  Denies Chest Pain, Denies Palpitations      Respiratory:  Denies: Cough, Coughing Blood, Productive Cough, Shortness of Air,    Wheezing      Gastrointestinal:  Admits Diarrhea; Denies Bloody Stools, Denies Constipation,     Denies Nausea/Vomiting, Denies Problem Swallowing, Denies Unable to Control     Bowels      Genitourinary:  Denies Blood in Urine, Denies Incontinence, Denies Painful     Urination      Musculoskeletal:  Denies Back Pain, Denies Muscle Pain; Admits Painful Joints      Integumentary:  Denies Itching, Denies Lesions, Denies Rash      Neurologic:  Denies Dizziness, Denies Numbness\Tingling, Denies Seizures      Psychiatric:  Denies Anxiety, Denies Depression      Endocrine:  Denies Cold Intolerance, Denies Heat Intolerance      Hematologic/Lymphatic:  Denies Bruising, Denies Bleeding, Denies Enlarged Lymph     Nodes      Reproductive:  Denies: Menopause, Heavy Periods, Pregnant, Still Menstruating            VITAL SIGNS AND SCORES      Pain Score      Experiencing any pain?:  No      Pain Scale Used:  Numerical      Pain Intensity:  0            Fatigue Score      Experiencing any fatigue?:  Yes      Fatigue (0-10 scale):  4            EXAM      General: Alert, cooperative, no acute distress      Eyes: Anicteric sclera, PERRLA      HEENT: Oropharynx clear, no exudates      Respiratory: CTAB, normal respiratory effort      Abdomen:  Normal active bowel sounds, no tenderness, no distention      Cardiovascular: RRR, no murmur, trace lower extremity edema      Skin: Normal tone, no rash, no lesions      Psychiatric: Appropriate affect, intact judgment      Neurologic: No focal sensory or motor deficits, no weakness, numbness, dizziness      Musculoskeletal: Normal muscle strength, normal muscle tone      Extremities: No clubbing, cyanosis, or deformities            PREVENTION      Hx Influenza Vaccination:  Yes      Date Influenza Vaccine Given:  Oct 2, 2019      Influenza Vaccine Declined:  No      2 or More Falls Past Year?:  No      Fall Past Year with Injury?:  No      Hx Pneumococcal Vaccination:  Yes      Encouraged to follow-up with:  PCP regarding preventative exams.      Chart initiated by      NITISH COLEMAN MA            ALLERGY/MEDS      Allergies      Coded Allergies:             MEPERIDINE (Verified  Allergy, Unknown, HIVES , 5/22/20)           MORPHINE (Verified  Allergy, Unknown, NAUSEA /PASSED OUT, 5/22/20)            Medications      Last Reconciled on 5/31/20 23:33 by ARABELLA FISH      Lisinopril-HCTZ 20-25 Mg (Lisinopril-HCTZ 20-25 Mg) 1 Each Tablet      1 TAB PO QDAY, #30 TAB 3 Refills         Prov: ARABELLA FISH         5/22/20       Multivitamin/Iron/Folic Acid (CENTRUM COMPLETE MULTIVIT TAB) Unknown Strength     Tablet      PO QDAY, #30 TAB 0 Refills         Reported         5/1/20       Prochlorperazine Maleate (Prochlorperazine Maleate) 10 Mg Tab      10 MG PO Q6H PRN for NAUSEA AND/OR VOMITING, #60 TAB 3 Refills         Prov: ARABELLA FISH         4/24/20       Ondansetron Odt (ONDANSETRON ODT) 8 Mg Tab.rapdis      8 MG PO Q8H PRN for NAUSEA, #30 TAB.RAPDIS 4 Refills         Prov: ARABELLA FISH         4/24/20       HYDROcodone-Acetaminophen 5-325 Mg (HYDROcodone-Acetaminophen 5-325 Mg) 1 Each     Tablet      2 TAB PO Q4H PRN for PAIN, #20 TAB         Prov: ASHLIE DE LA TORRE MD         4/21/20       Calcium  Citrate (Citracal) 200 Mg Tablet      200 MG PO QDAY for 30 Days, #30 TAB         Reported         4/10/20       Glucosamine/Msm/Chondroitin A (Condrolite) 1 Tab Tablet      1 TAB PO QDAY for 30 Days, #30 TAB         Reported         4/10/20       Metoprolol Succinate (Metoprolol Succinate) 50 Mg Tab.er.24h      75 MG PO HS, #30 TAB.SR.24H 0 Refills         Reported         4/10/20       Gabapentin (Gabapentin) 300 Mg Capsule      300 MG PO HS, #30 CAP 0 Refills         Reported         4/10/20       amLODIPine (amLODIPine) 2.5 Mg Tablet      2.5 MG PO HS, #30 TAB 0 Refills         Reported         4/9/20       Hctz (hydroCHLOROthiazide) 12.5 Mg Capsule      12.5 MG PO QDAY, #30 CAP 0 Refills         Reported         4/9/20       Lisinopril-HCTZ 10-12.5 Mg (Lisinopril-HCTZ 10-12.5 Mg) 1 Each Tablet      1 TAB PO QDAY, #30 TAB 0 Refills         Reported         4/9/20       Apixaban (Eliquis) 5 Mg Tablet      5 MG PO BID for 30 Days, #60 TAB         Reported         4/9/20      Medications Reviewed:  No Changes made to meds            IMPRESSION/PLAN      Impression      74-year-old female with HER-2 positive breast cancer            Diagnosis      High risk medication use - Z79.899            Breast CA - C50.919            Notes      New Medications      * Lisinopril-HCTZ 20-25 Mg 1 EACH TABLET: 1 TAB PO QDAY #30      New Diagnostics      * Echo Complete, 08/18/20         Dx: High risk medication use - Z79.899            Plan      HER-2 positive breast cancer: Patient is here for her second cycle of neoadjuvan    t chemotherapy with TCH.  I initially reduce the dose of the carboplatin to an     AUC of 4.  Today we will also decrease the dose of docetaxel to 60 mg per     metered squared down from 75 due to neuropathic lower extremity pain.  Patient     is also getting Neulasta.  I reviewed her labs and they are adequate to proceed     to chemotherapy.            Foot pain: Likely neuropathic pain secondary to  chemotherapy.  Patient was     started on gabapentin by her primary care provider.  I will decrease the dose of    docetaxel as above.            CHF: Patient's recent echo shows her EF is 45 to 50%.  This puts her at     increased risk for further cardiac complications with Herceptin.  I discussed     this with the patient and we both agreed to proceed with treatment.  On sure     that she gets a repeat echo in 3 months.  I have discussed her case with her     cardiologist, Dr. Vazquez.  We agreed to alter her blood pressure medications.      She will stop amlodipine and stop the individual HCTZ tablet.  She will double     her dose of lisinopril/HCTZ.  This will give her 20 mg of lisinopril a day.  She    will also stay on her dose of metoprolol.  Per Dr. Vazquez she was asked to keep a    recording of her blood pressure 3 times a day and she will follow-up in his     office next week.            Diarrhea: Chemotherapy-induced.  We discussed the use of Imodium.            Hypokalemia: Secondary to diarrhea.  We will give the patient 7 days of oral     potassium chloride.  We will recheck labs when she returns to clinic for her     next cycle.            Patient Education      Patient Education Provided:  Yes            Electronically signed by ARABELLA FISH  05/31/2020 23:34       Disclaimer: Converted document may not contain table formatting or lab diagrams. Please see [employer] for the authenticated document.

## 2021-05-28 NOTE — PROGRESS NOTES
Patient: YANDEL PHILLIPS     Acct: [id number]     Report: #[id number]  UNIT #: [id number]     : 1945    Encounter Date:2020  PRIMARY CARE: WILLIAM ROSS  ***Signed***  --------------------------------------------------------------------------------------------------------------------  NURSE INTAKE      Visit Type      Established Patient Visit            Chief Complaint      BREAST CANCER            Referring Provider/Copies To      Primary Care Provider:  WILLIAM ROSS      Copies To:   WILLIAM ROSS            History and Present Illness      Past Oncology Illness History      Ms. Phillips is a 74-year-old female who was referred to me by Dr. Simon at     the patient was diagnosed with invasive ductal carcinoma of the left breast.            The breast mass was initially noted on screening mammogram.  Diagnostic     mammogram and ultrasound confirmed a 2.7 x 2.5 cm mass at the 3 o'clock     position.  She underwent biopsy on 2020.  Pathology was positive for     invasive ductal carcinoma, estrogen receptor positive (75%, moderate),     progesterone receptor positive (40%, moderate) HER-2 equivocal (2+ by IHC), HER-    2 FISH positive, Ki-67 55%.            ECHO Global Hypokinesis 45-50%            Cycle 1 of neoadjuvant TCH on 2020 -AUC of 4      Cycle 2 of TCH on 20 (60mg/kg Taxol)       Cycle 3 on 2020      Cycle 4 on 2020 - further chemotherapy was halted due to patient     intolerance.            Left breast lumpectomy 2020: Patient had a residual 15 mm tumor, grade 2     with no associated DCIS.  Lymph vascular invasion was present.  Margins are     negative.  4 lymph nodes were examined and all 4 were found to be neck.  Final     stage [id number].            HPI - Oncology Interim      Patient comes in today to follow-up after her lumpectomy.  She tolerated the     procedure fairly well.  There was residual disease present.  She is having some     tenderness  and swelling.  The site appears to have healed well.  She see Yady     and Occupational Therapy on 2020.  She is also has some leg swelling.  [facility], she saw her cardiologist and had a negative Doppler ultrasound.  She is on 2    different heart medications.  She has follow-up with Dr. Vazquez on Monday.  She     is also currently undergoing radiation therapy.            Clinical Staging       [id number]            ECOG Performance Status      1            PAST, FAMILY   Past Medical History      Past Medical History:  Heart Attack, Hypertension      Hematology/Oncology (F):  Breast Cancer            Past Surgical History      VAD Placement            Hernia repair            Family History            Father  from bone marrow cancer, paternal aunt with leukemia      Mother diagnosed with breast cancer at age 68      Sister diagnosed with colon cancer            Social History      Lives independently:  No            Tobacco Use      Tobacco status:  Never smoker            Substance Use      Substance use:  Denies use            REVIEW OF SYSTEMS      General:  Admits: Fatigue;          Denies: Appetite Change, Fever, Night Sweats, Weight Gain, Weight Loss      Eye:  Denies Blurred Vision, Denies Corrective Lenses, Denies Diplopia, Denies     Vision Changes      ENT:  Denies Headache, Denies Hearing Loss, Denies Hoarseness, Denies Sore     Throat      Cardiovascular:  Denies Chest Pain, Denies Palpitations      Other      Lower extremity edema      Respiratory:  Denies: Cough, Coughing Blood, Productive Cough, Shortness of Air,    Wheezing      Gastrointestinal:  Denies Bloody Stools, Denies Constipation, Denies Diarrhea,     Denies Nausea/Vomiting, Denies Problem Swallowing, Denies Unable to Control     Bowels      Genitourinary:  Denies Blood in Urine, Denies Incontinence, Denies Painful     Urination      Musculoskeletal:  Admits Back Pain; Denies Muscle Pain; Admits Painful Joints      Integumentary:   Denies Itching, Denies Lesions, Denies Rash      Neurologic:  Denies Dizziness, Denies Numbness\Tingling, Denies Seizures      Psychiatric:  Denies Anxiety, Denies Depression      Endocrine:  Denies Cold Intolerance, Denies Heat Intolerance      Hematologic/Lymphatic:  Denies Bruising, Denies Bleeding, Denies Enlarged Lymph     Nodes      Reproductive:  Denies: Menopause, Heavy Periods, Pregnant, Still Menstruating            VITAL SIGNS AND SCORES      Vitals      Weight 231 lbs 4.201 oz / 104.9 kg      Temperature 97.1 F / 36.17 C - Temporal      Pulse 86      Respirations 18      Blood Pressure 131/84 Sitting, Right Arm      Pulse Oximetry 99%, ROOM AIR            Pain Score      Experiencing any pain?:  Yes      Pain Scale Used:  Numerical      Pain Intensity:  7            Fatigue Score      Experiencing any fatigue?:  Yes      Fatigue (0-10 scale):  4            EXAM      General: Alert, cooperative, no acute distress      Eyes: Anicteric sclera, PERRLA      Breast: Left breast has well-healed surgical incision site      Respiratory: CTAB, normal respiratory effort      Abdomen: Normal active bowel sounds, no tenderness, no distention      Cardiovascular: RRR, no murmur, no peripheral edema      Skin: Normal tone, no rash, no lesions      Psychiatric: Appropriate affect, intact judgment      Neurologic: No focal sensory or motor deficits, no weakness, numbness, dizziness      Musculoskeletal: Normal muscle strength, normal muscle tone      Extremities: No clubbing, cyanosis, or deformities            PREVENTION      Date Influenza Vaccine Given:  Oct 2, 2019      Influenza Vaccine Declined:  No      2 or More Falls in Past Year?:  No      Fall Past Year with Injury?:  No      Hx Pneumococcal Vaccination:  Yes      Encouraged to follow-up with:  PCP regarding preventative exams.      Chart initiated by      FELECIA MCCULLOUGH            ALLERGY/MEDS      Allergies      Coded Allergies:             MEPERIDINE  (Verified  Allergy, Unknown, HIVES , 9/24/20)           MORPHINE (Verified  Allergy, Unknown, NAUSEA /PASSED OUT, 9/24/20)            Medications      Last Reconciled on 10/4/20 18:25 by ARABELLA FISH      Prochlorperazine Maleate (Prochlorperazine Maleate) 10 Mg Tab      10 MG PO Q6H PRN for NAUSEA AND/OR VOMITING, #60 TAB 5 Refills         Prov: ARABELLA FISH         9/25/20       Ondansetron Odt (ONDANSETRON ODT) 8 Mg Tab.rapdis      8 MG PO Q8H PRN for NAUSEA, #30 TAB.RAPDIS 5 Refills         Prov: ARABELLA FISH         9/25/20       Potassium Chloride (Potassium Chloride) 10 Meq Capsule.er      20 MEQ PO HS, #60 CAP.ER 0 Refills         Reported         8/17/20       Furosemide (Furosemide) 40 Mg Tablet      40 MG PO QDAY, #30 TAB 0 Refills         Reported         8/17/20       Carvedilol (Carvedilol) 25 Mg Tablet      25 MG PO BID, #60 TAB 0 Refills         Reported         8/17/20       Gabapentin (Gabapentin) 300 Mg Capsule      300 MG PO HS, #30 CAP 0 Refills         Reported         4/10/20       Apixaban (Eliquis) 5 Mg Tablet      5 MG PO BID for 30 Days, #60 TAB         Reported         4/9/20      Medications Reviewed:  No Changes made to meds            IMPRESSION/PLAN      Diagnosis      Notes      New Medications      * ONDANSETRON ODT 8 MG TAB.RAPDIS: 8 MG PO Q8H PRN NAUSEA #30      * Prochlorperazine Maleate 10 MG TAB: 10 MG PO Q6H PRN NAUSEA AND/OR VOMITING       #60      Discontinued Medications      * HYDROcodone-Acetaminophen 5-325 Mg 1 EACH TABLET: 2 TAB PO Q4H PRN PAIN #30            Plan      Hormone receptor positive, HER-2 positive left breast cancer: Patient completed     4 cycles of neoadjuvant chemotherapy with TCH.  Did have residual disease after     lumpectomy.  We discussed the risks and benefits of Kadcyla and the patient is     agreeable.            CHF: Patient is followed by Dr. Vazquez who is aware that she has been getting     Herceptin.  She states that he has been doing  periodic echocardiograms and has     had no reduced heart function.  He has changed around her medications.      Fortunately, Kadcyla has less risk of heart failure than Herceptin.            Patient Education      Patient Education Provided:  Yes            Electronically signed by ARABELLA FISH  10/04/2020 18:25       Disclaimer: Converted document may not contain table formatting or lab diagrams. Please see [employer] [facility] for the authenticated document.

## 2021-05-28 NOTE — PROGRESS NOTES
Patient: YANDEL PHILLIPS     Acct: [id number]     Report: #[id number]  UNIT #: [id number]     : 1945    Encounter Date:2020  PRIMARY CARE: WILLIAM ROSS  ***Signed***  --------------------------------------------------------------------------------------------------------------------  NURSE INTAKE      Visit Type      Established Patient Visit            Chief Complaint      BREAST CANCER            Referring Provider/Copies To      Referring Provider:  ASHLIE DE LA TORRE MD      Primary Care Provider:  WILLIAM ROSS      Copies To:   WILLIAM ROSS            History and Present Illness      Past Oncology Illness History      Ms. Phillips is a 74-year-old female who was referred to me by Dr. Simon at     the patient was diagnosed with invasive ductal carcinoma of the left breast.            The breast mass was initially noted on screening mammogram.  Diagnostic     mammogram and ultrasound confirmed a 2.7 x 2.5 cm mass at the 3 o'clock     position.  She underwent biopsy on 2020.  Pathology was positive for     invasive ductal carcinoma, estrogen receptor positive (75%, moderate), progeste    gena receptor positive (40%, moderate) HER-2 equivocal (2+ by IHC), HER-2 FISH     positive, Ki-67 55%.            Cycle 1 of neoadjuvant TCH on 2020            HPI - Oncology Interim      Patient comes in today for cycle 1 of chemotherapy.  We discussed the need for     an echo which is ideally done prior to the start of chemotherapy.  Patient     states that she sees Dr. Vazquez, her local cardiologist.  She had an echo at some    point recently but is unsure when.  We did discuss optimal diet and other     nutritional facts today.  She would like to speak with a nutritionist soon so     that she can get more information.            ECOG Performance Status      0            REVIEW OF SYSTEMS      General:  Denies: Appetite Change, Fatigue, Fever, Night Sweats, Weight Gain,     Weight Loss       Eye:  Denies Blurred Vision, Denies Corrective Lenses, Denies Diplopia, Denies     Vision Changes      ENT:  Denies Headache, Denies Hearing Loss, Denies Hoarseness, Denies Sore     Throat      Cardiovascular:  Denies Chest Pain, Denies Palpitations      Respiratory:  Denies: Cough, Coughing Blood, Productive Cough, Shortness of Air,    Wheezing      Gastrointestinal:  Denies Bloody Stools, Denies Constipation, Denies Diarrhea,     Denies Nausea/Vomiting, Denies Problem Swallowing, Denies Unable to Control     Bowels      Genitourinary:  Denies Blood in Urine, Denies Incontinence, Denies Painful     Urination      Musculoskeletal:  Denies Back Pain, Denies Muscle Pain, Denies Painful Joints      Integumentary:  Denies Itching, Denies Lesions, Denies Rash      Neurologic:  Denies Dizziness, Denies Numbness\Tingling, Denies Seizures      Psychiatric:  Denies Anxiety, Denies Depression      Endocrine:  Denies Cold Intolerance, Denies Heat Intolerance      Hematologic/Lymphatic:  Denies Bruising, Denies Bleeding, Denies Enlarged Lymph     Nodes      Reproductive:  Denies: Menopause, Heavy Periods, Pregnant, Still Menstruating            VITAL SIGNS AND SCORES      Vitals      Weight 233 lbs 14.529 oz / 106.1 kg      Temperature 97.0 F / 36.11 C - Temporal      Pulse 80      Respirations 18      Blood Pressure 140/83 Sitting, Right Arm      Pulse Oximetry 99%, ROOM AIR            Pain Score      Experiencing any pain?:  No      Pain Scale Used:  Numerical      Pain Intensity:  0            Fatigue Score      Experiencing any fatigue?:  No      Fatigue (0-10 scale):  0 (none)            EXAM      General: Alert, cooperative, no acute distress      Eyes: Anicteric sclera, PERRLA      HEENT: Oropharynx clear, no exudates      Respiratory: CTAB, normal respiratory effort      Abdomen: Normal active bowel sounds, no tenderness, no distention      Cardiovascular: RRR, no murmur, no peripheral edema      Skin: Normal tone, no  rash, no lesions      Psychiatric: Appropriate affect, intact judgment      Neurologic: No focal sensory or motor deficits, no weakness, numbness, dizziness      Musculoskeletal: Normal muscle strength, normal muscle tone      Extremities: No clubbing, cyanosis, or deformities            PREVENTION      Hx Influenza Vaccination:  Yes      Date Influenza Vaccine Given:  Oct 2, 2019      2 or More Falls Past Year?:  No      Fall Past Year with Injury?:  No      Hx Pneumococcal Vaccination:  Yes      Encouraged to follow-up with:  PCP regarding preventative exams.      Chart initiated by      NITISH COLEMAN MA            ALLERGY/MEDS      Allergies      Coded Allergies:             MEPERIDINE (Verified  Allergy, Unknown, HIVES , 4/30/20)           MORPHINE (Verified  Allergy, Unknown, NAUSEA /PASSED OUT, 4/30/20)            Medications      Last Reconciled on 5/10/20 22:09 by ARABELLA FISH      Multivitamin/Iron/Folic Acid (CENTRUM COMPLETE MULTIVIT TAB) Unknown Strength     Tablet      PO QDAY, #30 TAB 0 Refills         Reported         5/1/20       Prochlorperazine Maleate (Prochlorperazine Maleate) 10 Mg Tab      10 MG PO Q6H PRN for NAUSEA AND/OR VOMITING, #60 TAB 3 Refills         Prov: ARABELLA FISH         4/24/20       Ondansetron Odt (ONDANSETRON ODT) 8 Mg Tab.rapdis      8 MG PO Q8H PRN for NAUSEA, #30 TAB.RAPDIS 4 Refills         Prov: ARABELLA FISH         4/24/20       HYDROcodone-Acetaminophen 5-325 Mg (HYDROcodone-Acetaminophen 5-325 Mg) 1 Each     Tablet      2 TAB PO Q4H PRN for PAIN, #20 TAB         Prov: ASHLIE DE LA TORRE MD         4/21/20       Calcium Citrate (Citracal) 200 Mg Tablet      200 MG PO QDAY for 30 Days, #30 TAB         Reported         4/10/20       Glucosamine/Msm/Chondroitin A (Condrolite) 1 Tab Tablet      1 TAB PO QDAY for 30 Days, #30 TAB         Reported         4/10/20       Metoprolol Succinate (Metoprolol Succinate) 50 Mg Tab.er.24h      75 MG PO HS, #30 TAB.SR.24H 0  Refills         Reported         4/10/20       Gabapentin (Gabapentin) 300 Mg Capsule      300 MG PO HS, #30 CAP 0 Refills         Reported         4/10/20       amLODIPine (amLODIPine) 2.5 Mg Tablet      2.5 MG PO HS, #30 TAB 0 Refills         Reported         4/9/20       Hctz (hydroCHLOROthiazide) 12.5 Mg Capsule      12.5 MG PO QDAY, #30 CAP 0 Refills         Reported         4/9/20       Lisinopril-HCTZ 10-12.5 Mg (Lisinopril-HCTZ 10-12.5 Mg) 1 Each Tablet      1 TAB PO QDAY, #30 TAB 0 Refills         Reported         4/9/20       Apixaban (Eliquis) 5 Mg Tablet      5 MG PO BID for 30 Days, #60 TAB         Reported         4/9/20      Medications Reviewed:  No Changes made to meds            IMPRESSION/PLAN      Impression      74-year-old female with HER-2 positive breast cancer            Diagnosis      Breast cancer - C50.919            Encounter for chemotherapy management - Z51.11            Notes      New Diagnostics      * CCC CBC With Auto Diff, Routine         Dx: Breast cancer - C50.919      * CCC Comp Metabolic Panel, Routine         Dx: Breast cancer - C50.919      * Echo Follow-Up, SCHEDULED PROCEDURE         Dx: Encounter for chemotherapy management - Z51.11            Plan      HER-2 positive breast cancer: Patient is here for her first cycle of neoadjuvant    TCH.  I reduce the dose of carboplatin to an AUC of 4.  She will also get     Neulasta.  I reviewed the patient's labs and she will proceed to chemotherapy.      However, after review of records from Dr. Vazquez's office her previous ECHO was     more than a year ago.  I will send her for repeat echo at this time and follow-    up with her neck cycle chemotherapy.            Patient Education      Patient Education Provided:  Yes            Electronically signed by ARABELLA FISH  05/10/2020 22:09       Disclaimer: Converted document may not contain table formatting or lab diagrams. Please see [employer] [facility] for the  authenticated document.

## 2021-05-28 NOTE — PROGRESS NOTES
Patient: YANDEL PHILLIPS     Acct: [id number]     Report: #[id number]  UNIT #: [id number]     : 1945    Encounter Date:2020  PRIMARY CARE: WILLIAM ROSS  ***Signed***  --------------------------------------------------------------------------------------------------------------------  NURSE INTAKE      Visit Type      Established Patient Visit            Chief Complaint      BREAST CANCER            Referring Provider/Copies To      Primary Care Provider:  WILLIAM ROSS      Copies To:   WILLIMA ROSS            History and Present Illness      Past Oncology Illness History      Ms. Phillips is a 74-year-old female who was referred to me by Dr. Simon at     the patient was diagnosed with invasive ductal carcinoma of the left breast.            The breast mass was initially noted on screening mammogram.  Diagnostic     mammogram and ultrasound confirmed a 2.7 x 2.5 cm mass at the 3 o'clock     position.  She underwent biopsy on 2020.  Pathology was positive for     invasive ductal carcinoma, estrogen receptor positive (75%, moderate),     progesterone receptor positive (40%, moderate) HER-2 equivocal (2+ by IHC), HER-    2 FISH positive, Ki-67 55%.            ECHO Global Hypokinesis 45-50%            Cycle 1 of neoadjuvant TCH on 2020 -AUC of 4      Cycle 2 of TCH on 20 (60mg/kg Taxol)       Cycle 3 on 2020      Cycle 4 on 2020 - further chemotherapy was halted due to patient     intolerance.            Left breast lumpectomy 2020: Patient had a residual 15 mm tumor, grade 2     with no associated DCIS.  Lymph vascular invasion was present.  Margins are     negative.  4 lymph nodes were examined and all 4 were found to be neck.  Final     stage [id number].            Since the patient had residual disease on lobectomy her treatment was switched     to Kadcyla which she will give her 1 year.  First cycle on 10/20/2020.            HPI - Oncology Interim      1) Left  breast cancer: Mrs. Yareli Phillips presents cycle 3 Kadcyla every 21 days.    She was here last week and Kadcylas was held last week due to low platelet count    of 64,000.             She has repeated lab work today to see if her counts have returned to normal     range. Patient does report she had an echocardiogram with Dr. Vazquez office. Last    echocardiogram in May showed EF of 45-50%. Patient reports she has had some     nausea and constipation. At her last labwork, total bili was elevated at 1.42     and direct and indirect bili were WNL.             Patient reports she has a bone scan scheduled for this Thursday, 20 at     [facility].             2) Thrombocytopenia: Platelet count of 66,000. Denies any bleeding or bruising.             3) Neutropenia: WBC 2.02, ANC of 900. Denies any fevers or chills or productive     cough.            Clinical Staging       [id number]            ECOG Performance Status      1            Most Recent Lab Findings      Laboratory Tests      11/10/20 09:41            PAST, FAMILY   Past Medical History      Past Medical History:  Heart Attack, Hypertension      Hematology/Oncology (F):  Breast Cancer            Past Surgical History      VAD Placement            Hernia repair            Family History            Father  from bone marrow cancer, paternal aunt with leukemia      Mother diagnosed with breast cancer at age 68      Sister diagnosed with colon cancer            Social History      Lives independently:  No            Tobacco Use      Tobacco status:  Never smoker            Substance Use      Substance use:  Denies use            REVIEW OF SYSTEMS      General:  Denies: Appetite Change, Fatigue, Fever, Night Sweats, Weight Gain,     Weight Loss      Eye:  Denies Blurred Vision, Denies Corrective Lenses, Denies Diplopia, Denies     Vision Changes      ENT:  Denies Headache, Denies Hearing Loss, Denies Hoarseness, Denies Sore     Throat       Cardiovascular:  Denies Chest Pain, Denies Palpitations      Respiratory:  Denies: Cough, Coughing Blood, Productive Cough, Shortness of Air,    Wheezing      Gastrointestinal:  Admits Nausea/Vomiting; Denies Bloody Stools, Denies     Constipation, Denies Diarrhea, Denies Problem Swallowing, Denies Unable to     Control Bowels      Other      NAUSEA- NO VOMITING      Genitourinary:  Denies Blood in Urine, Denies Incontinence, Denies Painful     Urination      Musculoskeletal:  Admits Back Pain, Admits Painful Joints      Other      LOWER BACK      Integumentary:  Denies Itching, Denies Lesions, Denies Rash      Neurologic:  Denies Dizziness; Admits Numbness\Tingling; Denies Seizures      Other      FINGERS      Psychiatric:  Denies Anxiety, Denies Depression      Endocrine:  Denies Cold Intolerance, Denies Heat Intolerance      Hematologic/Lymphatic:  Denies Bruising, Denies Bleeding, Denies Enlarged Lymph     Nodes      Reproductive:  Denies: Menopause, Heavy Periods, Pregnant, Still Menstruating            VITAL SIGNS AND SCORES      Vitals      Weight 195 lbs 1.714 oz / 88.5 kg      Temperature 97.6 F / 36.44 C - Temporal      Pulse 76      Respirations 18      Blood Pressure 117/67 Sitting      Pulse Oximetry 97%, RM AIR            Pain Score      Experiencing any pain?:  Yes      Pain Scale Used:  Numerical      Pain Intensity:  8      Pain Comment:        LOWER BACK            Fatigue Score      Experiencing any fatigue?:  Yes      Fatigue (0-10 scale):  2            PT/OT Functional Screening      No needs identified            Speech Functional Screening      No needs identified            Rehab to be Ordered      Type of Referral to be Ordered:  No needs identified            EXAM      General appearance:  in no apparent distress, cooperative, appears stated age.      HEENT: No pallor, no icterus, oral mucosa moist      Neck: Supple, trachea central-not deviated      Lymph nodes: none palpable  peripherally      Cardiovascular: S1-S2 heard, no murmurs, no rubs, no gallops.      Breast exam:      Respiratory: Clear to auscultation bilaterally, no adventitious sounds      Abdomen/gastro: Soft, nontender, no palpable hepatosplenomegaly, bowel sounds     heard      Skin: No lesions, no rashes, no petechiae.      Extremities: No pedal edema, peripheral pulses felt, no clubbing      Musculoskeletal: Normal tone, no rigidity of muscles; range of motion intact      Spine: No deformities      Psychiatric: Alert and oriented x3, appropriate affect, intact judgment      Neurologic: Cranial nerves II through XII grossly intact, no gross neurological     deficits noted.            PREVENTION      Hx Influenza Vaccination:  Yes      Date Influenza Vaccine Given:  Nov 2, 2020      Influenza Vaccine Declined:  No      2 or More Falls in Past Year?:  No      Fall Past Year with Injury?:  No      Hx Pneumococcal Vaccination:  Yes      Encouraged to follow-up with:  PCP regarding preventative exams.      Chart initiated by      BRETT RAND MA            ALLERGY/MEDS      Allergies      Coded Allergies:             MEPERIDINE (Verified  Allergy, Unknown, HIVES , 11/17/20)           MORPHINE (Verified  Allergy, Unknown, NAUSEA /PASSED OUT, 11/17/20)            Medications      Last Reconciled on 11/17/20 14:54 by HOSSEIN PERLA      Spironolactone (Spironolactone*) 25 Mg Tablet      25 MG PO QDAY, #30 TAB 0 Refills         Reported         11/10/20       traMADol (Ultram) 50 Mg Tablet      50 MG PO Q6H PRN for PAIN, TAB 0 Refills         Reported         11/10/20       Potassium Chloride (Potassium Chloride) 10 Meq Capsule.er      20 MEQ PO HS, #60 CAP.ER 0 Refills         Prov: ARABELLA FISH         10/20/20       Prochlorperazine Maleate (Prochlorperazine Maleate) 10 Mg Tab      10 MG PO Q6H PRN for NAUSEA AND/OR VOMITING, #60 TAB 5 Refills         Prov: ARABELLA FISH         9/25/20       Ondansetron Odt  (ONDANSETRON ODT) 8 Mg Tab.rapdis      8 MG PO Q8H PRN for NAUSEA, #30 TAB.RAPDIS 5 Refills         Prov: ARABELLA FISH         9/25/20       Furosemide (Furosemide) 40 Mg Tablet      40 MG PO QDAY, #30 TAB 0 Refills         Reported         8/17/20       Carvedilol (Carvedilol) 25 Mg Tablet      25 MG PO BID, #60 TAB 0 Refills         Reported         8/17/20       Gabapentin (Gabapentin) 300 Mg Capsule      300 MG PO HS, #30 CAP 0 Refills         Reported         4/10/20       Apixaban (Eliquis) 5 Mg Tablet      5 MG PO BID for 30 Days, #60 TAB         Reported         4/9/20      Medications Reviewed:  No Changes made to meds            IMPRESSION/PLAN      Impression      1) Left breast cancer: Mrs. Yareli Phillips presents cycle 3 Kadcyla every 21 days.    She was here last week and Kadcylas was held last week due to low platelet count    of 64,000.             She has repeated lab work today to see if her counts have returned to normal     range. Patient does report she had an echocardiogram with Dr. Vazquez office. Last    echocardiogram in May showed EF of 45-50%. Patient reports she has had some     nausea and constipation. At her last labwork, total bili was elevated at 1.42     and direct and indirect bili were WNL.             Patient reports she has a bone scan scheduled for this Thursday, 11/19/20 at     [facility].             Lab work reveiwed today. We will have to hold Kadcyla today since her platelet     count needs to be above 75,000. We will check iron studies, folate and B-12 and     thyroid studies today.             We will request the echo from Dr. Vazquez's office.             2) Thrombocytopenia: Platelet count of 66,000. Denies any bleeding or bruising.             3) Neutropenia: WBC 2.02, ANC of 900. Denies any fevers or chills or productive     cough.            Diagnosis      Anemia         Anemia, unspecified type - D64.9         Anemia type: unspecified type             Thrombocytopenia - D69.6            Hyperbilirubinemia - E80.6            Notes      New Diagnostics      * Iron Profile, Routine         Dx: Anemia - D64.9      * Ferritin Level, Routine         Dx: Anemia - D64.9      * B12      Dx: Anemia - D64.9      * Thyroid Profile, Routine         Dx: Anemia - D64.9      * Urinalysis, Routine         Dx: Anemia - D64.9            Plan      1) Hold Kadcyla today. Unsure of etiology of thrombocytopenia and leukopenia.     May be treatment related since Kadcyla does have side effects of neutropenia,     and thrombocytopenia.             Return in 2 weeks for consideration of Kadcyla with follow up with Dr. Camp.             check folate, B-12, thyroid, iron panel, and ferritin today and review at next     visit.             Request echocardiogram results for our records from Dr. Vazquez's office.            Pain      Pain Zero Today            Advanced Care Plan Discussion      Declines Discussion [id number]            Patient Education            Ado-trastuzumab Emtansine Injection      DI for Neutropenia      Patient Education Provided:  Yes            Electronically signed by HOSSEIN PERLA onc  11/17/2020 14:54       Disclaimer: Converted document may not contain table formatting or lab diagrams. Please see [employer] for the authenticated document.

## 2021-05-28 NOTE — PROGRESS NOTES
Patient: YANDEL BARRERA     Acct: [id number]     Report: #[id number]  UNIT #: [id number]     : 1945    Encounter Date:2021  PRIMARY CARE: WILLIAM ROSS  ***Signed***  --------------------------------------------------------------------------------------------------------------------  TELEHEALTH NOTE      Past Oncology Illness History      HER2+ Breast Cancer:            The breast mass was initially noted on screening mammogram.  Diagnostic     mammogram and ultrasound confirmed a 2.7 x 2.5 cm mass at the 3 o'clock     position.  She underwent biopsy on 2020.  Pathology was positive for     invasive ductal carcinoma, estrogen receptor positive (75%, moderate),     progesterone receptor positive (40%, moderate) HER-2 equivocal (2+ by IHC), HER-    2 FISH positive, Ki-67 55%.            ECHO Global Hypokinesis 45-50%            Cycle 1 of neoadjuvant TCH on 2020 -AUC of 4      Cycle 2 of TCH on 20 (60mg/kg Taxol)       Cycle 3 on 2020      Cycle 4 on 2020 - further chemotherapy was halted due to patient     intolerance.            Left breast lumpectomy 2020: Patient had a residual 15 mm tumor, grade 2     with no associated DCIS.  Lymph vascular invasion was present.  Margins are     negative.  4 lymph nodes were examined and all 4 were found to be neck.  Final     stage [id number].            Since the patient had residual disease on lumpectomy her treatment was switched     to Kadcyla which she will give her 1 year.  First cycle on 10/20/2020.  Decrease    the dose of Kadcyla after her first cycle due to nausea, fatigue, and decreased     appetite.  Cycle 2 on 11/10/2020.            After 2 cycles of Kadcyla patient was transitioned back 2 Herceptin due to     cytopenias. C7 with Herceptin on 20. Transitioned back to Kadcyla at a     reduced dose after developing LE edema of Herceptin but developed further     swelling and stopped anticancer therapy entirely.             Started anastrozole in January 2021            History of Present Illness            Chief Complaint:  BREAST CANCER             Yareli Phillips is presenting for evaluation via Telehealth visit by phone. Verbal     consent obtained before beginning visit.            Provider spent (12) minutes with the patient during telehealth visit.            The following staff were present during the visit: (Jessy Roach, RN)                         Overview of Symptoms      I contacted the patient today to follow-up with her regarding any side effects     with anastrozole.  Amazingly she has tolerated it very well.  She says she is     only noticed a slight decrease in her appetite which is good.  She is trying to     have a better diet and has increased her exercise.  The swelling in her legs has    also improved.  She was scheduled to have her port removed but had to cancel due    to bad weather.  She hopes to reschedule that next week.            Allergies/Medications      Allergies:        Coded Allergies:             MEPERIDINE (Verified  Allergy, Unknown, HIVES , 2/8/21)           MORPHINE (Verified  Adverse Reaction, Severe, NAUSEA /PASSED OUT, 2/8/21)      Medications    Last Reconciled on 2/18/21 21:34 by ARABELLA FISH      Anastrozole (Anastrozole) 1 Mg Tablet      1 MG PO QDAY, #90 TAB 3 Refills         Prov: ARABELLA FISH         2/18/21       Gabapentin (Gabapentin) 300 Mg Capsule      300 MG PO BID, #60 CAP 5 Refills         Prov: ARABELLA FISH         2/3/21       Anastrozole (Anastrozole) 1 Mg Tablet      1 MG PO QDAY, #30 TAB 1 Refill         Prov: ARABELLA FISH         1/19/21       Losartan Potassium (Losartan*) 25 Mg Tablet      25 MG PO QDAY, #30 TAB 0 Refills         Reported         12/22/20       Spironolactone (Spironolactone*) 25 Mg Tablet      25 MG PO QDAY, #30 TAB 0 Refills         Reported         11/10/20       traMADol (Ultram) 50 Mg Tablet      50 MG PO Q6H PRN for PAIN, TAB 0 Refills          Reported         11/10/20       Potassium Chloride (Potassium Chloride) 10 Meq Capsule.er      20 MEQ PO HS, #60 CAP.ER 0 Refills         Prov: ARABELLA FISH         10/20/20       Prochlorperazine Maleate (Prochlorperazine Maleate) 10 Mg Tab      10 MG PO Q6H PRN for NAUSEA AND/OR VOMITING, #60 TAB 5 Refills         Prov: ARABELLA FISH         9/25/20       Ondansetron Odt (ONDANSETRON ODT) 8 Mg Tab.rapdis      8 MG PO Q8H PRN for NAUSEA, #30 TAB.RAPDIS 5 Refills         Prov: ARABELLA FISH         9/25/20       Furosemide (Furosemide) 40 Mg Tablet      40 MG PO QDAY, #30 TAB 0 Refills         Reported         8/17/20       Carvedilol (Carvedilol) 25 Mg Tablet      25 MG PO BID, #60 TAB 0 Refills         Reported         8/17/20       Apixaban (Eliquis) 5 Mg Tablet      5 MG PO BID for 30 Days, #60 TAB         Reported         4/9/20            Plan/Instructions      Ambulatory Assessment/Plan:        Notes      New Medications      * Anastrozole 1 MG TABLET: 1 MG PO QDAY #90      Plan/Instructions            * Plan Of Care:       * HR +/HER-2 + left breast cancer: Patient completed 4 cycles of neoadjuvant TCH      but stopped chemotherapy early due to intolerance.  After lumpectomy on       8/18/2020 she had a 1.5 cm residual tumor.  She was able to complete 3 to 4       months of anti-HER-2 therapy before stopping due to deconditioning and lower       extremity edema.  Patient started anastrozole in January 2021 and is       tolerating it very well.  I will refill the medication with a 90-day supply.        She wishes to have her mammograms done in [place] and will have them       ordered by her primary care provider.  Her next screening mammogram is due       now.  She will follow up with me in 6 months with repeat labs including CBC       and CMP            * Risk for osteoporosis: Patient reports her last DEXA scan was done at       [place] by her PCP.  I encouraged her to have that scheduled as well.             * CHF: Patient's ejection fraction is 35 - 45%.  Patient has follow-up with Dr. Vazquez in cardiology.  Lower extremity edema is improving.            * Lymphedema: Patient continues to see [facility] and is       doing her exercises at home.  The lymphedema is slowly improving.            * Chronic conditions reviewed and taken into consideration for today's treatment      plan.      * Patient instructed to seek medical attention urgently for new or worsening       symptoms.      * Patient was educated/instructed on their diagnosis, treatment and medications       prior to discharge from the clinic today.      Codes:  Phone Eval 11-20 mi [id number]            Electronically signed by ARABELLA FISH  02/18/2021 21:35       Disclaimer: Converted document may not contain table formatting or lab diagrams. Please see [employer] for the authenticated document.

## 2021-05-28 NOTE — PROGRESS NOTES
Patient: YANDEL PHILLIPS     Acct: [id number]     Report: #[id number]  UNIT #: [id number]     : 1945    Encounter Date:10/20/2020  PRIMARY CARE: WILLIAM ROSS  ***Signed***  --------------------------------------------------------------------------------------------------------------------  NURSE INTAKE      Visit Type      Established Patient Visit            Chief Complaint      BREAST CANCER            Referring Provider/Copies To      Primary Care Provider:  WILLIAM ROSS      Copies To:   WILLIAM ROSS            History and Present Illness      Past Oncology Illness History      Ms. Phillips is a 74-year-old female who was referred to me by Dr. Simon at     the patient was diagnosed with invasive ductal carcinoma of the left breast.            The breast mass was initially noted on screening mammogram.  Diagnostic     mammogram and ultrasound confirmed a 2.7 x 2.5 cm mass at the 3 o'clock     position.  She underwent biopsy on 2020.  Pathology was positive for     invasive ductal carcinoma, estrogen receptor positive (75%, moderate),     progesterone receptor positive (40%, moderate) HER-2 equivocal (2+ by IHC), HER-    2 FISH positive, Ki-67 55%.            ECHO Global Hypokinesis 45-50%            Cycle 1 of neoadjuvant TCH on 2020 -AUC of 4      Cycle 2 of TCH on 20 (60mg/kg Taxol)       Cycle 3 on 2020      Cycle 4 on 2020 - further chemotherapy was halted due to patient     intolerance.            Left breast lumpectomy 2020: Patient had a residual 15 mm tumor, grade 2     with no associated DCIS.  Lymph vascular invasion was present.  Margins are     negative.  4 lymph nodes were examined and all 4 were found to be neck.  Final     stage [id number].            Since the patient had residual disease on lobectomy her treatment was switched     to Kadcyla which she will give her 1 year.  First cycle on 10/20/2020.            HPI - Oncology Interim      Patient  comes in today for her first cycle of Kadcyla.  She states that she has     lost weight but is due to fluid loss.  She has been on Lasix twice a day by her     cardiologist.  Otherwise she feels like she is improving after chemotherapy.            Clinical Staging       [id number]            ECOG Performance Status      1            PAST, FAMILY   Past Medical History      Past Medical History:  Heart Attack, Hypertension      Hematology/Oncology (F):  Breast Cancer            Past Surgical History      VAD Placement            Hernia repair            Family History            Father  from bone marrow cancer, paternal aunt with leukemia      Mother diagnosed with breast cancer at age 68      Sister diagnosed with colon cancer            Social History      Lives independently:  No            Tobacco Use      Tobacco status:  Never smoker            Substance Use      Substance use:  Denies use            REVIEW OF SYSTEMS      General:  Admits: Fatigue, Weight Loss (FLUID);          Denies: Appetite Change, Fever, Night Sweats, Weight Gain      Eye:  Admits Corrective Lenses; Denies Blurred Vision, Denies Diplopia, Denies     Vision Changes      ENT:  Denies Headache, Denies Hearing Loss, Denies Hoarseness, Denies Sore     Throat      Cardiovascular:  Denies Chest Pain, Denies Palpitations      Respiratory:  Denies: Cough, Coughing Blood, Productive Cough, Shortness of Air,    Wheezing      Gastrointestinal:  Denies Bloody Stools, Denies Constipation, Denies Diarrhea,     Denies Nausea/Vomiting, Denies Problem Swallowing, Denies Unable to Control     Bowels      Genitourinary:  Denies Blood in Urine, Denies Incontinence, Denies Painful     Urination      Musculoskeletal:  Denies Back Pain, Denies Muscle Pain, Denies Painful Joints      Integumentary:  Denies Itching, Denies Lesions, Denies Rash      Neurologic:  Denies Dizziness, Denies Numbness\Tingling, Denies Seizures      Psychiatric:  Denies Anxiety, Denies  Depression      Endocrine:  Denies Cold Intolerance, Denies Heat Intolerance      Hematologic/Lymphatic:  Denies Bruising, Denies Bleeding, Denies Enlarged Lymph     Nodes      Reproductive:  Denies: Menopause, Heavy Periods, Pregnant, Still Menstruating            VITAL SIGNS AND SCORES      Vitals      Weight 210 lbs 1.574 oz / 95.3 kg      Temperature 96.5 F / 35.83 C - Temporal      Pulse 94      Respirations 20      Blood Pressure 119/69 Sitting      Pulse Oximetry 98%, ROOM AIR            Pain Score      Experiencing any pain?:  No      Pain Scale Used:  Numerical      Pain Intensity:  0            Fatigue Score      Experiencing any fatigue?:  Yes      Fatigue (0-10 scale):  5            EXAM      General: Alert, cooperative, no acute distress      Eyes: Anicteric sclera, PERRLA      Respiratory: CTAB, normal respiratory effort      Abdomen: Normal active bowel sounds, no tenderness, no distention      Cardiovascular: RRR, no murmur, no peripheral edema      Skin: Normal tone, no rash, no lesions      Psychiatric: Appropriate affect, intact judgment      Neurologic: No focal sensory or motor deficits, no weakness, numbness, dizziness      Musculoskeletal: Normal muscle strength, normal muscle tone      Extremities: No clubbing, cyanosis, or deformities            PREVENTION      Date Influenza Vaccine Given:  Oct 2, 2019      Influenza Vaccine Declined:  No      2 or More Falls in Past Year?:  No      Fall Past Year with Injury?:  No      Hx Pneumococcal Vaccination:  Yes      Encouraged to follow-up with:  PCP regarding preventative exams.      Chart initiated by      FELECIA MCCULLOUGH            ALLERGY/MEDS      Allergies      Coded Allergies:             MEPERIDINE (Verified  Allergy, Unknown, HIVES , 10/20/20)           MORPHINE (Verified  Allergy, Unknown, NAUSEA /PASSED OUT, 10/20/20)            Medications      Last Reconciled on 10/28/20 21:10 by ARABELLA FISH      Potassium Chloride (Potassium  Chloride) 10 Meq Capsule.er      20 MEQ PO HS, #60 CAP.ER 0 Refills         Prov: ARABELLA FISH         10/20/20       Prochlorperazine Maleate (Prochlorperazine Maleate) 10 Mg Tab      10 MG PO Q6H PRN for NAUSEA AND/OR VOMITING, #60 TAB 5 Refills         Prov: ARABELLA FISH         9/25/20       Ondansetron Odt (ONDANSETRON ODT) 8 Mg Tab.rapdis      8 MG PO Q8H PRN for NAUSEA, #30 TAB.RAPDIS 5 Refills         Prov: ARABELLA FISH         9/25/20       Furosemide (Furosemide) 40 Mg Tablet      40 MG PO QDAY, #30 TAB 0 Refills         Reported         8/17/20       Carvedilol (Carvedilol) 25 Mg Tablet      25 MG PO BID, #60 TAB 0 Refills         Reported         8/17/20       Gabapentin (Gabapentin) 300 Mg Capsule      300 MG PO HS, #30 CAP 0 Refills         Reported         4/10/20       Apixaban (Eliquis) 5 Mg Tablet      5 MG PO BID for 30 Days, #60 TAB         Reported         4/9/20      Medications Reviewed:  No Changes made to meds            IMPRESSION/PLAN      Diagnosis      High risk medication use - Z79.899            Notes      Renewed Medications      * Potassium Chloride 10 MEQ CAPSULE.ER: 20 MEQ PO HS #60      New Diagnostics      * Echo Complete, 3 Week         Dx: High risk medication use - Z79.899            Plan      HER-2, HR+ positive breast cancer: She completed 4 cycles of neoadjuvant TCH     then underwent left lumpectomy and was found to have residual disease.  She is     here today for her first cycle of Kadcyla which she will get every 3 weeks for a    year.  I reviewed her labs and they are adequate for her to get chemotherapy.            Hypokalemia: Secondary to increased Lasix use.  Patient's potassium is 1.3.  I     recommend she restart oral potassium segmentation.            CHF: Patient has a history of global hypokinesis with an EF of 45 to 50%.  I     contacted her cardiologist and ask him to repeat an echo.  She will continue to     need an echo every 3 months for at least  the next year due to continued     anti-HER-2 therapy.            Electronically signed by ARABELLA FISH  10/28/2020 21:10       Disclaimer: Converted document may not contain table formatting or lab diagrams. Please see [employer] for the authenticated document.

## 2021-05-28 NOTE — PROGRESS NOTES
Patient: YANDEL PHILLIPS     Acct: [id number]     Report: #[id number]  UNIT #: [id number]     : 1945    Encounter Date:11/10/2020  PRIMARY CARE: WILLIAM ROSS  ***Signed***  --------------------------------------------------------------------------------------------------------------------  NURSE INTAKE      Visit Type      Established Patient Visit            Chief Complaint      BREAST CANCER            Referring Provider/Copies To      Primary Care Provider:  WILLIAM ROSS      Copies To:   WILLIAM ROSS            History and Present Illness      Past Oncology Illness History      Ms. Phillips is a 74-year-old female who was referred to me by Dr. Simon at     the patient was diagnosed with invasive ductal carcinoma of the left breast.            The breast mass was initially noted on screening mammogram.  Diagnostic     mammogram and ultrasound confirmed a 2.7 x 2.5 cm mass at the 3 o'clock     position.  She underwent biopsy on 2020.  Pathology was positive for     invasive ductal carcinoma, estrogen receptor positive (75%, moderate),     progesterone receptor positive (40%, moderate) HER-2 equivocal (2+ by IHC), HER-    2 FISH positive, Ki-67 55%.            ECHO Global Hypokinesis 45-50%            Cycle 1 of neoadjuvant TCH on 2020 -AUC of 4      Cycle 2 of TCH on 20 (60mg/kg Taxol)       Cycle 3 on 2020      Cycle 4 on 2020 - further chemotherapy was halted due to patient     intolerance.            Left breast lumpectomy 2020: Patient had a residual 15 mm tumor, grade 2     with no associated DCIS.  Lymph vascular invasion was present.  Margins are     negative.  4 lymph nodes were examined and all 4 were found to be neck.  Final     stage [id number].            Since the patient had residual disease on lumpectomy her treatment was switched     to Kadcyla which she will give her 1 year.  First cycle on 10/20/2020.  Decrease    the dose of Kadcyla after her first  cycle due to nausea, fatigue, and decreased     appetite.            HPI - Oncology Interim      Patient comes in today for her second dose of Kadcyla.  She says she did not     tolerate the first dose very well at all.  She is very fatigued.  She had     significant nausea and a decreased appetite.  She has lost 16.7 pounds but says     most of this is due to a fluid pill that she was given by Dr. Vazquez.  Her     echocardiogram is scheduled for 2020.  She also has an appointment with     Dr. Mcconnell this coming Thursday.  She asked for dose of Kadcyla could be     reduced.            Clinical Staging       [id number]            ECOG Performance Status      1            Most Recent Lab Findings      Laboratory Tests      11/10/20 09:41            PAST, FAMILY   Past Medical History      Past Medical History:  Heart Attack, Hypertension      Hematology/Oncology (F):  Breast Cancer            Past Surgical History      Lumpectomy (Left), VAD Placement            Hernia repair            Family History            Father  from bone marrow cancer, paternal aunt with leukemia      Mother diagnosed with breast cancer at age 68      Sister diagnosed with colon cancer            Social History      Lives independently:  No            Tobacco Use      Tobacco status:  Never smoker            Substance Use      Substance use:  Denies use            REVIEW OF SYSTEMS      General:  Admits: Fatigue, Weight Loss;          Denies: Fever, Night Sweats, Weight Gain      Eye:  Denies Blurred Vision, Denies Corrective Lenses, Denies Diplopia, Denies     Vision Changes      ENT:  Denies Headache, Denies Hearing Loss, Denies Hoarseness, Denies Sore     Throat      Cardiovascular:  Denies Chest Pain, Denies Palpitations      Respiratory:  Denies: Cough, Coughing Blood, Productive Cough, Shortness of Air,    Wheezing      Gastrointestinal:  Admits Nausea/Vomiting; Denies Bloody Stools, Denies     Constipation, Denies Diarrhea,  Denies Problem Swallowing, Denies Unable to     Control Bowels      Genitourinary:  Denies Blood in Urine, Denies Incontinence, Denies Painful Ur    ination      Musculoskeletal:  Admits Back Pain; Denies Muscle Pain, Denies Painful Joints      Integumentary:  Denies Itching, Denies Lesions, Denies Rash      Neurologic:  Denies Dizziness, Denies Numbness\Tingling, Denies Seizures      Psychiatric:  Denies Anxiety, Denies Depression      Endocrine:  Denies Cold Intolerance, Denies Heat Intolerance      Hematologic/Lymphatic:  Denies Bruising, Denies Bleeding, Denies Enlarged Lymph     Nodes      Reproductive:  Denies: Menopause, Heavy Periods, Pregnant, Still Menstruating            VITAL SIGNS AND SCORES      Vitals      Weight 193 lbs 5.495 oz / 87.7 kg      Temperature 98.4 F / 36.89 C - Temporal      Pulse 104      Respirations 24      Blood Pressure 113/60 Sitting      Pulse Oximetry 95%, ROOM AIR            Pain Score      Experiencing any pain?:  Yes      Pain Scale Used:  Numerical      Pain Intensity:  8            Fatigue Score      Experiencing any fatigue?:  Yes      Fatigue (0-10 scale):  5            EXAM      General: Alert, cooperative, no acute distress      Eyes: Anicteric sclera, PERRLA      Respiratory: CTAB, normal respiratory effort      Abdomen: Normal active bowel sounds, no tenderness, no distention      Cardiovascular: RRR, no murmur, no peripheral edema      Skin: Normal tone, no rash, no lesions      Psychiatric: Appropriate affect, intact judgment      Neurologic: No focal sensory or motor deficits, no weakness, numbness, dizziness      Musculoskeletal: Normal muscle strength, normal muscle tone      Extremities: No clubbing, cyanosis, or deformities            PREVENTION      Date Influenza Vaccine Given:  Oct 2, 2019      Influenza Vaccine Declined:  No      2 or More Falls in Past Year?:  No      Fall Past Year with Injury?:  No      Hx Pneumococcal Vaccination:  Yes      Encouraged to  follow-up with:  PCP regarding preventative exams.      Chart initiated by      FELECIA MCCULLOUGH            ALLERGY/MEDS      Allergies      Coded Allergies:             MEPERIDINE (Verified  Allergy, Unknown, HIVES , 11/10/20)           MORPHINE (Verified  Allergy, Unknown, NAUSEA /PASSED OUT, 11/10/20)            Medications      Last Reconciled on 11/23/20 17:42 by ARABELLA FISH      Spironolactone (Spironolactone*) 25 Mg Tablet      25 MG PO QDAY, #30 TAB 0 Refills         Reported         11/10/20       traMADol (Ultram) 50 Mg Tablet      50 MG PO Q6H PRN for PAIN, TAB 0 Refills         Reported         11/10/20       Potassium Chloride (Potassium Chloride) 10 Meq Capsule.er      20 MEQ PO HS, #60 CAP.ER 0 Refills         Prov: ARABELLA FISH         10/20/20       Prochlorperazine Maleate (Prochlorperazine Maleate) 10 Mg Tab      10 MG PO Q6H PRN for NAUSEA AND/OR VOMITING, #60 TAB 5 Refills         Prov: ARABELLA FISH         9/25/20       Ondansetron Odt (ONDANSETRON ODT) 8 Mg Tab.rapdis      8 MG PO Q8H PRN for NAUSEA, #30 TAB.RAPDIS 5 Refills         Prov: ARABELLA FISH         9/25/20       Furosemide (Furosemide) 40 Mg Tablet      40 MG PO QDAY, #30 TAB 0 Refills         Reported         8/17/20       Carvedilol (Carvedilol) 25 Mg Tablet      25 MG PO BID, #60 TAB 0 Refills         Reported         8/17/20       Gabapentin (Gabapentin) 300 Mg Capsule      300 MG PO HS, #30 CAP 0 Refills         Reported         4/10/20       Apixaban (Eliquis) 5 Mg Tablet      5 MG PO BID for 30 Days, #60 TAB         Reported         4/9/20      Medications Reviewed:  No Changes made to meds            IMPRESSION/PLAN      Diagnosis      Elevated bilirubin - R17            Post-menopausal - Z78.0            Notes      New Diagnostics      * Bilirubin-Direct/Tot, Routine         Dx: Elevated bilirubin - R17      * Bone Densitometry DEXA, SCHEDULED PROCEDURE         Dx: Post-menopausal - Z78.0      * Bone Densitometry  DEXA, SCHEDULED PROCEDURE         Dx: Post-menopausal - Z78.0            Plan      HER-2 positive, HR positive left breast cancer: Patient only completed 4 cycles     of TCH and had to discontinue due to side effects.  She underwent lumpectomy and    was found to have residual disease.  She is here today for cycle 2 of Kadcyla     and requests a dose reduction due to fatigue, nausea, and decreased appetite.  I    will reduce the dose today and have her follow-up in 3 weeks.  At that time I     will likely start her on an aromatase inhibitor.  We will schedule a DEXA scan     before she returns to clinic.            CHF: Patient has a history of global hypokinesis with an EF of 45 to 50%.  She     is followed by Dr. Vazquez and will have a repeat echocardiogram on 11/16/2020.      This should continue every 3 months for the next year.            Hypokalemia: Resolved.  Patient's potassium is 3.6.  We will continue to     monitor.            Hypercalcemia: Patient's total calcium was 10.7 today.  We will continue to     monitor.  I will discuss adjuvant Prolia with her at an upcoming appointment.      This may address the hypercalcemia but also is recommended for treatment of her     breast cancer.            Patient Education      Patient Education Provided:  Yes            Electronically signed by ARABELLA FISH  11/23/2020 17:42       Disclaimer: Converted document may not contain table formatting or lab diagrams. Please see [employer] [facility] for the authenticated document.

## 2021-05-28 NOTE — PROGRESS NOTES
Patient: YANDEL BARRERA     Acct: [id number]     Report: #[id number]  UNIT #: [id number]     : 1945    Encounter Date:2020  PRIMARY CARE: WILLIAM ROSS  ***Signed***  --------------------------------------------------------------------------------------------------------------------  NURSE INTAKE      Visit Type      Established Patient Visit            Chief Complaint      BREAST CA            Referring Provider/Copies To      Primary Care Provider:  WILLIAM ROSS      Copies To:   WILLIAM ROSS            History and Present Illness      Past Oncology Illness History      HER2+ Breast Cancer:            The breast mass was initially noted on screening mammogram.  Diagnostic     mammogram and ultrasound confirmed a 2.7 x 2.5 cm mass at the 3 o'clock     position.  She underwent biopsy on 2020.  Pathology was positive for     invasive ductal carcinoma, estrogen receptor positive (75%, moderate),     progesterone receptor positive (40%, moderate) HER-2 equivocal (2+ by IHC), HER-    2 FISH positive, Ki-67 55%.            ECHO Global Hypokinesis 45-50%            Cycle 1 of neoadjuvant TCH on 2020 -AUC of 4      Cycle 2 of TCH on 20 (60mg/kg Taxol)       Cycle 3 on 2020      Cycle 4 on 2020 - further chemotherapy was halted due to patient     intolerance.            Left breast lumpectomy 2020: Patient had a residual 15 mm tumor, grade 2     with no associated DCIS.  Lymph vascular invasion was present.  Margins are nega    tive.  4 lymph nodes were examined and all 4 were found to be neck.  Final stage    [id number].            Since the patient had residual disease on lumpectomy her treatment was switched     to Kadcyla which she will give her 1 year.  First cycle on 10/20/2020.  Decrease    the dose of Kadcyla after her first cycle due to nausea, fatigue, and decreased     appetite.  Cycle 2 on 11/10/2020.            After 2 cycles of Kadcyla patient was transitioned  back 2 Herceptin due to     cytopenias. C7 with Herceptin on 20. Transitioned back to Kadcyla at a     reduced dose after developing LE edema of Herceptin.            HPI - Oncology Interim      Patient comes in today for her next dose of Herceptin.  Unfortunately her weight    is further increased.  She also fell and bruised her knee since she has so much     excess fluid on her legs.  Her left breast is swollen, likely due to lymphedema.     Also the patient's bilirubin is mildly elevated.  The etiology of this is     unknown.  I discussed her case with Dr. Vazquez who recommended significant     diuresis.  We will also decrease the dose of Kadcyla and transition her back     take this medication.  Patient is agreeable 2 these changes.            Clinical Staging       [id number]            ECOG Performance Status      1            Most Recent Lab Findings      Laboratory Tests      20 13:30            20 13:42            PAST, FAMILY   Past Medical History      Past Medical History:  Heart Attack, Hypertension      Hematology/Oncology (F):  Breast Cancer            Past Surgical History      Lumpectomy (Left), VAD Placement            Hernia repair            Family History            Father  from bone marrow cancer, paternal aunt with leukemia      Mother diagnosed with breast cancer at age 68      Sister diagnosed with colon cancer            Social History      Lives independently:  No            Tobacco Use      Tobacco status:  Never smoker            Substance Use      Substance use:  Denies use            REVIEW OF SYSTEMS      General:  Admits: Fatigue, Weight Gain      Eye:  Denies Blurred Vision, Denies Corrective Lenses, Denies Diplopia, Denies     Vision Changes      ENT:  Denies Headache, Denies Hearing Loss, Denies Hoarseness, Denies Sore     Throat      Cardiovascular:  Denies Chest Pain, Denies Palpitations      Other      Severe lower extremity edema after her knees       Respiratory:  Denies: Cough, Coughing Blood, Productive Cough, Shortness of Air,    Wheezing      Gastrointestinal:  Denies Bloody Stools, Denies Constipation, Denies Diarrhea,     Denies Nausea/Vomiting, Denies Problem Swallowing, Denies Unable to Control     Bowels      Genitourinary:  Denies Blood in Urine, Denies Incontinence, Denies Painful Uri    nation      Musculoskeletal:  Denies Back Pain; Admits Muscle Pain; Denies Painful Joints      Other      L BREAST RED/WARM/SWOLLEN X 2-3 DAYS      Integumentary:  Denies Itching, Denies Lesions, Denies Rash      Other      2 OPEN AREAS, MID UPPER ABD AND BREAST AREA      Neurologic:  Denies Dizziness, Denies Numbness\Tingling, Denies Seizures      Psychiatric:  Denies Anxiety, Denies Depression      Endocrine:  Denies Cold Intolerance, Denies Heat Intolerance      Hematologic/Lymphatic:  Denies Bruising, Denies Bleeding, Denies Enlarged Lymph     Nodes      Reproductive:  Denies: Menopause, Heavy Periods, Pregnant, Still Menstruating            VITAL SIGNS AND SCORES      Vitals      Weight 227 lbs 1.181 oz / 103.0 kg      Temperature 98.1 F / 36.72 C - Temporal      Pulse 77      Respirations 20      Blood Pressure 88/43 Sitting      Pulse Oximetry 96%, RM AIR            Pain Score      Experiencing any pain?:  Yes      Pain Scale Used:  Numerical      Pain Intensity:  3      Pain Comment:        L BREAST AREA            Fatigue Score      Experiencing any fatigue?:  Yes      Fatigue (0-10 scale):  3            PT/OT Functional Screening      No needs identified            Speech Functional Screening      No needs identified            Rehab to be Ordered      Type of Referral to be Ordered:  No needs identified            EXAM      General: Alert, cooperative, no acute distress      Eyes: Anicteric sclera, PERRLA      Respiratory: CTAB, normal respiratory effort      Abdomen: Normal active bowel sounds, no tenderness, no distention      Cardiovascular: RRR, no  murmur, 2+ lower extremity edema      Skin: Normal tone, no rash, no lesions      Psychiatric: Appropriate affect, intact judgment      Neurologic: No focal sensory or motor deficits, no weakness, numbness, dizziness      Musculoskeletal: Normal muscle strength and tone      Extremities: No clubbing, cyanosis, or deformities            PREVENTION      Hx Influenza Vaccination:  Yes      Date Influenza Vaccine Given:  Nov 2, 2020      Influenza Vaccine Declined:  No      2 or More Falls in Past Year?:  No      Fall Past Year with Injury?:  Yes      Hx Pneumococcal Vaccination:  Yes      Encouraged to follow-up with:  PCP regarding preventative exams.      Chart initiated by      CARISSA CASTELLON MA            ALLERGY/MEDS      Allergies      Coded Allergies:             MEPERIDINE (Verified  Allergy, Unknown, HIVES , 12/29/20)           MORPHINE (Verified  Allergy, Unknown, NAUSEA /PASSED OUT, 12/29/20)            Medications      Last Reconciled on 1/17/21 21:51 by ARABELLA FISH      Losartan Potassium (Losartan*) 25 Mg Tablet      25 MG PO QDAY, #30 TAB 0 Refills         Reported         12/22/20       Spironolactone (Spironolactone*) 25 Mg Tablet      25 MG PO QDAY, #30 TAB 0 Refills         Reported         11/10/20       traMADol (Ultram) 50 Mg Tablet      50 MG PO Q6H PRN for PAIN, TAB 0 Refills         Reported         11/10/20       Potassium Chloride (Potassium Chloride) 10 Meq Capsule.er      20 MEQ PO HS, #60 CAP.ER 0 Refills         Prov: ARABELLA FISH         10/20/20       Prochlorperazine Maleate (Prochlorperazine Maleate) 10 Mg Tab      10 MG PO Q6H PRN for NAUSEA AND/OR VOMITING, #60 TAB 5 Refills         Prov: ARABELLA FISH         9/25/20       Ondansetron Odt (ONDANSETRON ODT) 8 Mg Tab.rapdis      8 MG PO Q8H PRN for NAUSEA, #30 TAB.RAPDIS 5 Refills         Prov: ARABELLA FISH         9/25/20       Furosemide (Furosemide) 40 Mg Tablet      40 MG PO QDAY, #30 TAB 0 Refills         Reported          8/17/20       Carvedilol (Carvedilol) 25 Mg Tablet      25 MG PO BID, #60 TAB 0 Refills         Reported         8/17/20       Gabapentin (Gabapentin) 300 Mg Capsule      300 MG PO HS, #30 CAP 0 Refills         Reported         4/10/20       Apixaban (Eliquis) 5 Mg Tablet      5 MG PO BID for 30 Days, #60 TAB         Reported         4/9/20      Medications Reviewed:  No Changes made to meds            IMPRESSION/PLAN      Plan      HR +/HER-2 + left breast cancer: Patient completed 4 cycles of neoadjuvant TCH     but stopped chemotherapy early due to intolerance.  After lumpectomy on     8/18/2020 she had a 1.5 cm residual tumor.  She then started adjuvant Kadcyla     which had 1 dose reduction and then stopped after 2 cycles due to cytopenias and    fatigue.  She was switched back Herceptin but developed significant lower     extremity edema.  Patient believes this is due to increased salt in her diet.      However, with increased diuresis that has not significantly improved.  We will     stop Herceptin and transition back Kadcyla at a further dose reduction.            CHF: Patient's ejection fraction is 35 - 45%.             Lymphedema: Patient has some swelling on the breast and left arm.  She continues    the follow-up with occupational therapy.            Patient Education      Patient Education Provided:  Yes            Electronically signed by ARABELLA FISH  01/17/2021 21:51       Disclaimer: Converted document may not contain table formatting or lab diagrams. Please see [employer] for the authenticated document.

## 2021-05-28 NOTE — PROGRESS NOTES
Patient: YANDEL PHILLIPS     Acct: [id number]     Report: #[id number]  UNIT #: [id number]     : 1945    Encounter Date:2020  PRIMARY CARE: WILLIAM ROSS  ***Signed***  --------------------------------------------------------------------------------------------------------------------  NURSE INTAKE      Visit Type      Established Patient Visit            Chief Complaint      BREAST CA            Referring Provider/Copies To      Primary Care Provider:  WILLIAM ROSS      Copies To:   WILLIAM ROSS            History and Present Illness      Past Oncology Illness History      Ms. Phillips is a 74-year-old female who was referred to me by Dr. Simon at     the patient was diagnosed with invasive ductal carcinoma of the left breast.            The breast mass was initially noted on screening mammogram.  Diagnostic     mammogram and ultrasound confirmed a 2.7 x 2.5 cm mass at the 3 o'clock     position.  She underwent biopsy on 2020.  Pathology was positive for     invasive ductal carcinoma, estrogen receptor positive (75%, moderate),     progesterone receptor positive (40%, moderate) HER-2 equivocal (2+ by IHC), HER-    2 FISH positive, Ki-67 55%.            ECHO Global Hypokinesis 45-50%            Cycle 1 of neoadjuvant TCH on 2020 -AUC of 4      Cycle 2 of TCH on 20 (60mg/kg Taxol)       Cycle 3 on 2020      Cycle 4 on 2020 - further chemotherapy was halted due to patient     intolerance.            Left breast lumpectomy 2020: Patient had a residual 15 mm tumor, grade 2     with no associated DCIS.  Lymph vascular invasion was present.  Margins are     negative.  4 lymph nodes were examined and all 4 were found to be neck.  Final     stage [id number].            Since the patient had residual disease on lumpectomy her treatment was switched     to Kadcyla which she will give her 1 year.  First cycle on 10/20/2020.  Decrease    the dose of Kadcyla after her first cycle  due to nausea, fatigue, and decreased     appetite.  Cycle 2 on 11/10/2020.            After 2 cycles of Kadcyla patient was transitioned back 2 Herceptin due to     cytopenias. C7 with Herceptin on 20.            HPI - Oncology Interim      Patient came in today for her 8th cycle of anti-HER2 therapy.  Unfortunately her    legs are very swollen and her weight is increased.  She says she has been trying    to eat more but states that everything contains salt. Cardiology also decreased     her diuretic from twice daily to once daily. Other than swelling the patient     feels well.            Clinical Staging       [id number]            ECOG Performance Status      1            Most Recent Lab Findings      Laboratory Tests      20 09:43            PAST, FAMILY   Past Medical History      Past Medical History:  Heart Attack, Hypertension      Hematology/Oncology (F):  Breast Cancer            Past Surgical History      Lumpectomy (Left), VAD Placement            Hernia repair            Family History            Father  from bone marrow cancer, paternal aunt with leukemia      Mother diagnosed with breast cancer at age 68      Sister diagnosed with colon cancer            Social History      Lives independently:  No            Tobacco Use      Tobacco status:  Never smoker            Substance Use      Substance use:  Denies use            REVIEW OF SYSTEMS      General:  Admits: Fatigue, Weight Gain (FLUID);          Denies: Appetite Change, Fever, Night Sweats, Weight Loss      Eye:  Denies Blurred Vision, Denies Corrective Lenses, Denies Diplopia, Denies     Vision Changes      ENT:  Admits Headache; Denies Hearing Loss, Denies Hoarseness, Denies Sore     Throat      Cardiovascular:  Denies Chest Pain, Denies Palpitations      Respiratory:  Denies: Cough, Coughing Blood, Productive Cough, Shortness of Air,    Wheezing      Gastrointestinal:  Denies Bloody Stools, Denies Constipation, Denies Diarrhea,      Denies Nausea/Vomiting, Denies Problem Swallowing, Denies Unable to Control     Bowels      Genitourinary:  Denies Blood in Urine, Denies Incontinence, Denies Painful     Urination      Musculoskeletal:  Denies Back Pain, Denies Muscle Pain, Denies Painful Joints      Integumentary:  Denies Itching, Denies Lesions, Denies Rash      Neurologic:  Denies Dizziness, Denies Numbness\Tingling, Denies Seizures      Psychiatric:  Denies Anxiety, Denies Depression      Endocrine:  Denies Cold Intolerance, Denies Heat Intolerance      Hematologic/Lymphatic:  Denies Bruising, Denies Bleeding, Denies Enlarged Lymph     Nodes      Reproductive:  Denies: Menopause, Heavy Periods, Pregnant, Still Menstruating            VITAL SIGNS AND SCORES      Vitals      Weight 222 lbs 10.634 oz / 101.0 kg      Temperature 97.8 F / 36.56 C - Temporal      Pulse 69      Respirations 18      Blood Pressure 94/49 Sitting      Pulse Oximetry 97%, RM AIR            Pain Score      Experiencing any pain?:  Yes      Pain Scale Used:  Numerical      Pain Intensity:  3      Pain Comment:        HEADACHE            Fatigue Score      Experiencing any fatigue?:  Yes      Fatigue (0-10 scale):  6            PT/OT Functional Screening      No needs identified            Speech Functional Screening      No needs identified            Rehab to be Ordered      Type of Referral to be Ordered:  No needs identified            EXAM      General: Alert, cooperative, no acute distress      Eyes: Anicteric sclera, PERRLA      Respiratory: CTAB, normal respiratory effort      Abdomen: Normal active bowel sounds, no tenderness, no distention      Cardiovascular: RRR, no murmur, 2+ lower extremity edema      Skin: Normal tone, no rash, no lesions      Psychiatric: Appropriate affect, intact judgment      Neurologic: No focal sensory or motor deficits, no weakness, numbness, dizziness      Musculoskeletal: Normal muscle strength and tone      Extremities: No  clubbing, cyanosis, or deformities            PREVENTION      Hx Influenza Vaccination:  No      Date Influenza Vaccine Given:  Oct 2, 2019      Influenza Vaccine Declined:  No      2 or More Falls in Past Year?:  No      Fall Past Year with Injury?:  No      Hx Pneumococcal Vaccination:  Yes      Encouraged to follow-up with:  PCP regarding preventative exams.      Chart initiated by      CARISSA CASTELLON MA            ALLERGY/MEDS      Allergies      Coded Allergies:             MEPERIDINE (Verified  Allergy, Unknown, HIVES , 12/22/20)           MORPHINE (Verified  Allergy, Unknown, NAUSEA /PASSED OUT, 12/22/20)            Medications      Last Reconciled on 12/22/20 18:10 by ARABELLA FISH      Losartan Potassium (Losartan*) 25 Mg Tablet      25 MG PO QDAY, #30 TAB 0 Refills         Reported         12/22/20       Spironolactone (Spironolactone*) 25 Mg Tablet      25 MG PO QDAY, #30 TAB 0 Refills         Reported         11/10/20       traMADol (Ultram) 50 Mg Tablet      50 MG PO Q6H PRN for PAIN, TAB 0 Refills         Reported         11/10/20       Potassium Chloride (Potassium Chloride) 10 Meq Capsule.er      20 MEQ PO HS, #60 CAP.ER 0 Refills         Prov: ARABELLA FISH         10/20/20       Prochlorperazine Maleate (Prochlorperazine Maleate) 10 Mg Tab      10 MG PO Q6H PRN for NAUSEA AND/OR VOMITING, #60 TAB 5 Refills         Prov: ARABELLA FISH         9/25/20       Ondansetron Odt (ONDANSETRON ODT) 8 Mg Tab.rapdis      8 MG PO Q8H PRN for NAUSEA, #30 TAB.RAPDIS 5 Refills         Prov: ARABELLA FISH         9/25/20       Furosemide (Furosemide) 40 Mg Tablet      40 MG PO QDAY, #30 TAB 0 Refills         Reported         8/17/20       Carvedilol (Carvedilol) 25 Mg Tablet      25 MG PO BID, #60 TAB 0 Refills         Reported         8/17/20       Gabapentin (Gabapentin) 300 Mg Capsule      300 MG PO HS, #30 CAP 0 Refills         Reported         4/10/20       Apixaban (Eliquis) 5 Mg Tablet      5 MG PO BID for 30  Days, #60 TAB         Reported         4/9/20      Medications Reviewed:  No Changes made to meds            IMPRESSION/PLAN      Plan      HR +/HER-2 + left breast cancer: Patient completed 4 cycles of neoadjuvant TCH     but stopped chemotherapy early due to intolerance.  After lumpectomy on 8/18/202    0 she had a 1.5 cm residual tumor.  She then started adjuvant Kadcyla which had     2 be dose reduced and then stopped after 2 cycles due to cytopenias and fatigue.     Patient transitioned back to Herceptin for one dose but has developed volume     overload again.  Today we will hold treatment. She will contact cardiology and     increase her Lasix back to twice a day. We discussed transitioning back to     Kadcyla at a lower dose. She will f/u with me in 1-2 weeks.             CHF: Patient's ejection fraction is 35 - 45%.             Lymphedema: Patient has some swelling on the breast and left arm.  She continues    the follow-up with occupational therapy.            Patient Education      Patient Education Provided:  Yes            Electronically signed by ARABELLA FISH  12/22/2020 18:10       Disclaimer: Converted document may not contain table formatting or lab diagrams. Please see [employer] [facility] for the authenticated document.

## 2021-05-28 NOTE — PROGRESS NOTES
Patient: YANDEL PHILLIPS     Acct: [id number]     Report: #[id number]  UNIT #: [id number]     : 1945    Encounter Date:2020  PRIMARY CARE: WILLIAM ROSS  ***Signed***  --------------------------------------------------------------------------------------------------------------------  NURSE INTAKE      Visit Type      Established Patient Visit            Chief Complaint      BREAST CANCER            Referring Provider/Copies To      Referring Provider:  ASLHIE DE LA TORRE MD      Primary Care Provider:  WILLIAM ROSS      Copies To:   WILLIAM ROSS            History and Present Illness      Past Oncology Illness History      Ms. Phillips is a 74-year-old female who was referred to me by Dr. Simon at     the patient was diagnosed with invasive ductal carcinoma of the left breast.            The breast mass was initially noted on screening mammogram.  Diagnostic     mammogram and ultrasound confirmed a 2.7 x 2.5 cm mass at the 3 o'clock     position.  She underwent biopsy on 2020.  Pathology was positive for     invasive ductal carcinoma, estrogen receptor positive (75%, moderate),     progesterone receptor positive (40%, moderate) HER-2 equivocal (2+ by IHC), HER-    2 FISH positive, Ki-67 55%.            2020: ECHO Global Hypokinesis 45-50% -closely followed by Dr. Vazquez in     cardiology            Cycle 1 of neoadjuvant TCH on 2020 - AUC of 4      Cycle 2 of TCH on 20 (60mg/kg Taxol)       Cycle 3 on 2020      Cycle 4 on 2020            HPI - Oncology Interim      Patient comes in today for follow-up and for cycle 5 of chemotherapy.  However     she has not tolerated the past 4 cycles very well.  Despite dose reduction she     still says that she is really struggling to chemotherapy.  She feels weak.  Her     blood pressure has been in the 80s over 40s.  She had significant diarrhea with     the fourth cycle and had to wear a diaper on days 4 and 5.  She is also  lost 7.2    pounds over the past couple of weeks.            She follows up with Dr. Moreno her cardiologist on 20.  She believes he has     an ECHO planned that day in the office.            ECOG Performance Status      2            Most Recent Lab Findings      Laboratory Tests      20 08:55            PAST, FAMILY   Past Medical History      Past Medical History:  Heart Attack, Hypertension      Hematology/Oncology (F):  Breast Cancer            Past Surgical History      VAD Placement            Hernia repair            Family History            Father  from bone marrow cancer, paternal aunt with leukemia      Mother diagnosed with breast cancer at age 68      Sister diagnosed with colon cancer            Social History      Marital Status:        Lives independently:  No            Tobacco Use      Tobacco status:  Never smoker            Alcohol Use      Alcohol intake:  None            Substance Use      Substance use:  Denies use            REVIEW OF SYSTEMS      General:  Admits: Fatigue;          Denies: Appetite Change, Fever, Night Sweats, Weight Gain, Weight Loss      Eye:  Denies Blurred Vision, Denies Corrective Lenses, Denies Diplopia, Denies     Vision Changes      ENT:  Denies Headache, Denies Hearing Loss, Denies Hoarseness, Denies Sore     Throat      Cardiovascular:  Denies Chest Pain, Denies Palpitations      Respiratory:  Denies: Cough, Coughing Blood, Productive Cough, Shortness of Air,    Wheezing      Gastrointestinal:  Denies Bloody Stools, Denies Constipation, Denies Diarrhea,     Denies Nausea/Vomiting, Denies Problem Swallowing, Denies Unable to Control     Bowels      Genitourinary:  Denies Blood in Urine, Denies Incontinence, Denies Painful     Urination      Musculoskeletal:  Denies Back Pain, Denies Muscle Pain; Admits Painful Joints      Integumentary:  Denies Itching, Denies Lesions, Denies Rash      Neurologic:  Denies Dizziness, Denies Numbness\Tingling,  Denies Seizures      Psychiatric:  Denies Anxiety, Denies Depression      Endocrine:  Denies Cold Intolerance, Denies Heat Intolerance      Hematologic/Lymphatic:  Denies Bruising, Denies Bleeding, Denies Enlarged Lymph     Nodes      Reproductive:  Denies: Menopause, Heavy Periods, Pregnant, Still Menstruating            VITAL SIGNS AND SCORES      Vitals      Weight 217 lbs 9.504 oz / 98.7 kg      Temperature 97.9 F / 36.61 C - Temporal      Pulse 83      Respirations 18      Blood Pressure 86/37 Sitting      Pulse Oximetry 99%, ROOM AIR            Pain Score      Experiencing any pain?:  Yes      Pain Scale Used:  Numerical      Pain Intensity:  4            Fatigue Score      Experiencing any fatigue?:  Yes      Fatigue (0-10 scale):  3            EXAM      General: Alert, cooperative, no acute distress, appears fatigued but otherwise     well      Eyes: Anicteric sclera, PERRLA      HEENT: Oropharynx clear, no exudates      Respiratory: CTAB, normal respiratory effort      Abdomen: Normal active bowel sounds, no tenderness, no distention      Cardiovascular: RRR, no murmur, no peripheral edema      Skin: Normal tone, no rash, no lesions      Psychiatric: Appropriate affect, intact judgment      Neurologic: No focal sensory or motor deficits, no weakness, numbness, dizziness      Musculoskeletal: Normal muscle strength, normal muscle tone      Extremities: No clubbing, cyanosis, or deformities            PREVENTION      Hx Influenza Vaccination:  Yes      Date Influenza Vaccine Given:  Oct 2, 2019      Influenza Vaccine Declined:  No      2 or More Falls in Past Year?:  No      Fall Past Year with Injury?:  No      Hx Pneumococcal Vaccination:  Yes      Encouraged to follow-up with:  PCP regarding preventative exams.      Chart initiated by      FELECIA MINOR CMA            ALLERGY/MEDS      Allergies      Coded Allergies:             MEPERIDINE (Verified  Allergy, Unknown, HIVES , 7/24/20)           MORPHINE  (Verified  Allergy, Unknown, NAUSEA /PASSED OUT, 7/24/20)            Medications      Last Reconciled on 8/1/20 17:40 by ARABELLA FISH      Furosemide (Furosemide) 20 Mg Tablet      20 MG PO QDAY for 5 Days, #5 TAB 0 Refills         Prov: ARABELLA FISH         7/31/20       Lisinopril-HCTZ 20-25 Mg (Lisinopril-HCTZ 20-25 Mg) 1 Each Tablet      1 TAB PO QDAY, #30 TAB 0 Refills         Reported         7/31/20       Carvedilol (Carvedilol) 12.5 Mg Tablet      12.5 MG PO BID, #60 TAB 0 Refills         Reported         7/2/20       Dexamethasone (Decadron) 4 Mg Tablet      8 MG PO QDAY, #6 TAB 4 Refills         Prov: ARABELLA FISH         6/12/20       Potassium Chloride (K-Dur*) 10 Meq Tab.er.prt      20 MEQ PO QDAY, #60 TAB 1 Refill         Prov: ARABELLA FISH         6/12/20       Multivitamin/Iron/Folic Acid (CENTRUM COMPLETE MULTIVIT TAB) Unknown Strength     Tablet      PO QDAY, #30 TAB 0 Refills         Reported         5/1/20       Prochlorperazine Maleate (Prochlorperazine Maleate) 10 Mg Tab      10 MG PO Q6H PRN for NAUSEA AND/OR VOMITING, #60 TAB 3 Refills         Prov: ARABELLA FISH         4/24/20       Ondansetron Odt (ONDANSETRON ODT) 8 Mg Tab.rapdis      8 MG PO Q8H PRN for NAUSEA, #30 TAB.RAPDIS 4 Refills         Prov: ARABELLA FISH         4/24/20       HYDROcodone-Acetaminophen 5-325 Mg (HYDROcodone-Acetaminophen 5-325 Mg) 1 Each     Tablet      2 TAB PO Q4H PRN for PAIN, #20 TAB         Prov: ASHLIE DE LA TORRE MD         4/21/20       Calcium Citrate (Citracal) 200 Mg Tablet      200 MG PO QDAY for 30 Days, #30 TAB         Reported         4/10/20       Glucosamine/Msm/Chondroitin A (Condrolite) 1 Tab Tablet      1 TAB PO QDAY for 30 Days, #30 TAB         Reported         4/10/20       Metoprolol Succinate (Metoprolol Succinate) 50 Mg Tab.er.24h      75 MG PO HS, #30 TAB.SR.24H 0 Refills         Reported         4/10/20       Gabapentin (Gabapentin) 300 Mg Capsule      300 MG PO HS, #30 CAP 0  Refills         Reported         4/10/20       amLODIPine (amLODIPine) 2.5 Mg Tablet      2.5 MG PO HS, #30 TAB 0 Refills         Reported         4/9/20       Hctz (hydroCHLOROthiazide) 12.5 Mg Capsule      12.5 MG PO QDAY, #30 CAP 0 Refills         Reported         4/9/20       Apixaban (Eliquis) 5 Mg Tablet      5 MG PO BID for 30 Days, #60 TAB         Reported         4/9/20      Medications Reviewed:  No Changes made to meds            IMPRESSION/PLAN      Impression      Hormone receptor positive/HER-2 positive breast cancer: Patient completed 4     cycles of chemotherapy with [facility].  She is tolerated poorly despite dose     reductions and carboplatin to an AUC of 4 and paclitaxel to 60 mg per metered     squared.  I discussed options with her including proceeding to surgery after onl    y 4 cycles versus continuing with 2 more cycles of treatment but leaving out     carboplatin entirely.  I decided to send her for repeat breast MRI.  After this     she will follow-up and we can discuss the plan in further detail.  We are     holding treatment today.            Hypotension: Patient has not been feeling well and has not been eating and     drinking enough.  I have instructed her to hold her Coreg tonight and her     lisinopril HCTZ tomorrow morning.  Then she should follow-up with Dr. Vazquez for     further instructions.  I will give her 2 L of IV fluids today with normal     saline.            History of CHF: Her most recent echo on 5/18/2020 showed global hypokinesis with    an EF of 45 to 50%.  She is closely followed by Dr. Vazquez in cardiology.  I have    ordered a repeat echo on 8/18/2020 but she believes she will have one in Dr. Vazquez's office on 8/5/2020.  If she has 1 in his office she does not need to get    the one that I have scheduled.            Diagnosis      Breast cancer - C50.919            Notes      New Diagnostics      * FROILAN Breast w Contrast MRI, As Soon As Possible         Dx: Breast  cancer - C50.919            Patient Education      Patient Education Provided:  Yes            Electronically signed by ARABELLA FISH  08/01/2020 17:40       Disclaimer: Converted document may not contain table formatting or lab diagrams. Please see [employer] for the authenticated document.

## 2021-05-28 NOTE — PROGRESS NOTES
Patient: YANDEL BARRERA     Acct: [id number]     Report: #[id number]  UNIT #: [id number]     : 1945    Encounter Date:2021  PRIMARY CARE: WILLIAM ROSS  ***Signed***  --------------------------------------------------------------------------------------------------------------------  NURSE INTAKE      Visit Type      Established Patient Visit            Chief Complaint      BREAST CA            Referring Provider/Copies To      Primary Care Provider:  WILLIAM ROSS      Copies To:   WILLIAM ROSS            History and Present Illness      Past Oncology Illness History      HER2+ Breast Cancer:            The breast mass was initially noted on screening mammogram.  Diagnostic     mammogram and ultrasound confirmed a 2.7 x 2.5 cm mass at the 3 o'clock     position.  She underwent biopsy on 2020.  Pathology was positive for     invasive ductal carcinoma, estrogen receptor positive (75%, moderate),     progesterone receptor positive (40%, moderate) HER-2 equivocal (2+ by IHC), HER-    2 FISH positive, Ki-67 55%.            ECHO Global Hypokinesis 45-50%            Cycle 1 of neoadjuvant TCH on 2020 -AUC of 4      Cycle 2 of TCH on 20 (60mg/kg Taxol)       Cycle 3 on 2020      Cycle 4 on 2020 - further chemotherapy was halted due to patient     intolerance.            Left breast lumpectomy 2020: Patient had a residual 15 mm tumor, grade 2     with no associated DCIS.  Lymph vascular invasion was present.  Margins are nega    tive.  4 lymph nodes were examined and all 4 were found to be neck.  Final stage    [id number].            Since the patient had residual disease on lumpectomy her treatment was switched     to Kadcyla which she will give her 1 year.  First cycle on 10/20/2020.  Decrease    the dose of Kadcyla after her first cycle due to nausea, fatigue, and decreased     appetite.  Cycle 2 on 11/10/2020.            After 2 cycles of Kadcyla patient was transitioned  back 2 Herceptin due to     cytopenias. C7 with Herceptin on 20. Transitioned back to Kadcyla at a     reduced dose after developing LE edema of Herceptin.  Discontinued Herceptin/Kad    cyla indefinitely on 2021 due to edema.            HPI - Oncology Interim      Patient comes in today for her next dose of Kadcyla.  She tells me that she fell    at home while on the stairs.  She did not have any resulting fractures but does     have a significant bruising.  She is also been having significant persistent     lower extremity swelling.  After long conversation I explained to the patient     that I simply cannot proceed with Kadcyla for Herceptin.  Each time she gets a     dose of Granix medication she has increasing edema and clinically worsens.      Despite the patient thinking this could be due to a change in her diet, I am     afraid it is due to her worsening CHF.  She is very concerned about stopping the    medications for fear that her breast cancer could come back.  However at this     point I believe it is too greater risk to continue.  Ultimately the patient did     agree.  She is willing to start the emergency department now.  We did discuss     the risks and benefits of this medication as he had not discussed it in a long     period of time.  Her past bone density scans were done at [place] and she     would like to repeat that as well.  She has follow-up with Dr. Vazquez in     cardiology on the .            Clinical Staging       [id number]            ECOG Performance Status      1            Most Recent Lab Findings      Laboratory Tests      21 10:25            PAST, FAMILY   Past Medical History      Past Medical History:  Heart Attack, Hypertension      Other PMH:        CHF      Hematology/Oncology (F):  Breast Cancer            Past Surgical History      Lumpectomy (Left), VAD Placement            Hernia repair            Family History            Father  from bone marrow  cancer, paternal aunt with leukemia      Mother diagnosed with breast cancer at age 68      Sister diagnosed with colon cancer            Social History      Lives independently:  No            Tobacco Use      Tobacco status:  Never smoker            Substance Use      Substance use:  Denies use            REVIEW OF SYSTEMS      General:  Admits: Fatigue;          Denies: Appetite Change, Fever, Night Sweats, Weight Gain, Weight Loss      Eye:  Denies Blurred Vision, Denies Corrective Lenses, Denies Diplopia, Denies     Vision Changes      ENT:  Denies Headache, Denies Hearing Loss, Denies Hoarseness, Denies Sore     Throat      Cardiovascular:  Denies Chest Pain, Denies Palpitations      Other      Edema      Respiratory:  Denies: Cough, Coughing Blood, Productive Cough, Shortness of Air,    Wheezing      Gastrointestinal:  Denies Bloody Stools, Denies Constipation, Denies Diarrhea,     Denies Nausea/Vomiting, Denies Problem Swallowing, Denies Unable to Control     Bowels      Genitourinary:  Denies Blood in Urine, Denies Incontinence, Denies Painful Ur    ination      Musculoskeletal:  Denies Back Pain; Admits Muscle Pain; Denies Painful Joints      Integumentary:  Denies Itching, Denies Lesions, Denies Rash      Neurologic:  Denies Dizziness, Denies Numbness\Tingling, Denies Seizures      Psychiatric:  Denies Anxiety, Denies Depression      Endocrine:  Denies Cold Intolerance, Denies Heat Intolerance      Hematologic/Lymphatic:  Denies Bruising, Denies Bleeding, Denies Enlarged Lymph     Nodes      Reproductive:  Denies: Menopause, Heavy Periods, Pregnant, Still Menstruating            VITAL SIGNS AND SCORES      Vitals      Weight 216 lbs 0.813 oz / 98.0 kg      Temperature 96.7 F / 35.94 C - Temporal      Pulse 76      Respirations 20      Blood Pressure 96/52 Sitting      Pulse Oximetry 95%, RM AIR            Pain Score      Experiencing any pain?:  Yes      Pain Scale Used:  Numerical      Pain Intensity:  7             Fatigue Score      Experiencing any fatigue?:  Yes      Fatigue (0-10 scale):  6            PT/OT Functional Screening      No needs identified            Speech Functional Screening      No needs identified            Rehab to be Ordered      Type of Referral to be Ordered:  No needs identified            EXAM      General: Alert, cooperative, no acute distress      Eyes: Anicteric sclera, PERRLA      Respiratory: CTAB, normal respiratory effort      Abdomen: Normal active bowel sounds, no tenderness, no distention      Cardiovascular: RRR, no murmur, 2+ lower extremity edema      Skin: Normal tone, no rash, no lesions      Psychiatric: Appropriate affect, intact judgment      Neurologic: No focal sensory or motor deficits, no weakness, numbness, dizziness      Musculoskeletal: Reduced muscle strength and tone, in a wheelchair due to     deconditioning      Extremities: No clubbing, cyanosis, or deformities            PREVENTION      Hx Influenza Vaccination:  Yes      Date Influenza Vaccine Given:  Nov 2, 2020      Influenza Vaccine Declined:  No      2 or More Falls in Past Year?:  No      Fall Past Year with Injury?:  Yes      Hx Pneumococcal Vaccination:  Yes      Encouraged to follow-up with:  PCP regarding preventative exams.      Chart initiated by      CARISSA CASTELLON MA            ALLERGY/MEDS      Allergies      Coded Allergies:             MEPERIDINE (Verified  Allergy, Unknown, HIVES , 1/19/21)           MORPHINE (Verified  Allergy, Unknown, NAUSEA /PASSED OUT, 1/19/21)            Medications      Last Reconciled on 2/18/21 21:03 by ARABELLA FISH      Anastrozole (Anastrozole) 1 Mg Tablet      1 MG PO QDAY, #90 TAB 3 Refills         Prov: ARABELLA FISH         2/18/21       Gabapentin (Gabapentin) 300 Mg Capsule      300 MG PO BID, #60 CAP 5 Refills         Prov: ARABELLA FISH         2/3/21       Anastrozole (Anastrozole) 1 Mg Tablet      1 MG PO QDAY, #30 TAB 1 Refill         Prov:  ARABELLA FISH         1/19/21       Losartan Potassium (Losartan*) 25 Mg Tablet      25 MG PO QDAY, #30 TAB 0 Refills         Reported         12/22/20       Spironolactone (Spironolactone*) 25 Mg Tablet      25 MG PO QDAY, #30 TAB 0 Refills         Reported         11/10/20       traMADol (Ultram) 50 Mg Tablet      50 MG PO Q6H PRN for PAIN, TAB 0 Refills         Reported         11/10/20       Potassium Chloride (Potassium Chloride) 10 Meq Capsule.er      20 MEQ PO HS, #60 CAP.ER 0 Refills         Prov: ABEARABELLA         10/20/20       Prochlorperazine Maleate (Prochlorperazine Maleate) 10 Mg Tab      10 MG PO Q6H PRN for NAUSEA AND/OR VOMITING, #60 TAB 5 Refills         Prov: ABEARABELLA         9/25/20       Ondansetron Odt (ONDANSETRON ODT) 8 Mg Tab.rapdis      8 MG PO Q8H PRN for NAUSEA, #30 TAB.RAPDIS 5 Refills         Prov: FISHARABELLA         9/25/20       Furosemide (Furosemide) 40 Mg Tablet      40 MG PO QDAY, #30 TAB 0 Refills         Reported         8/17/20       Carvedilol (Carvedilol) 25 Mg Tablet      25 MG PO BID, #60 TAB 0 Refills         Reported         8/17/20       Apixaban (Eliquis) 5 Mg Tablet      5 MG PO BID for 30 Days, #60 TAB         Reported         4/9/20      Medications Reviewed:  No Changes made to meds            IMPRESSION/PLAN      Diagnosis      Breast cancer - C50.919            Anemia - D64.9            Notes      New Medications      * Anastrozole 1 MG TABLET: 1 MG PO QDAY #30      New Diagnostics      * Iron Profile, Routine         Dx: Anemia - D64.9      * Ferritin Level, Routine         Dx: Anemia - D64.9      * B12      Dx: Anemia - D64.9      New Referrals      * Surgery, Routine         ASHLIE DE LA TORRE MD         Reason for Referral: needs port removal         Dx: Breast cancer - C50.919            Plan      HR +/HER-2 + left breast cancer: Patient completed 4 cycles of neoadjuvant TCH     but stopped chemotherapy early due to intolerance.  After  lumpectomy on     8/18/2020 she had a 1.5 cm residual tumor.  She then started adjuvant Kadcyla     which had 1 dose reduction and then stopped after 2 cycles due to cytopenias and    fatigue.  She was switched back Herceptin but developed significant lower     extremity edema.  She then restarted Kadcyla with a second dose reduction but     again developed lower extremity edema and fell at home.  I will discontinue all     anti-HER-2 therapy at this time.  Patient will start anastrozole and follow-up     with me by phone in 3 to 4 weeks to discuss any side effects.  It is okay for     her to have her port removed at this point.  I will contact Dr. Dempsey     arrange this.            Risk for osteoporosis: Patient reports her last DEXA scan was done at     [place].  I will attempt to get these results.  She wishes to have the repeat    test done by her primary care provider so we can continue to be done close to     home.  I do recommend the patient take calcium and vitamin D daily especially w    hile on aromatase inhibitor due to increased risk for osteoporosis and bone     fractures.            CHF: Patient's ejection fraction is 35 - 45%.  Patient has follow-up with Dr. Vazquez in cardiology on 1/25/2021.            Lymphedema: Patient has some swelling on the breast and left arm.  She continues    the follow-up with occupational therapy.            Patient Education      Patient Education Provided:  Yes            Electronically signed by ARABELLA FISH  02/18/2021 21:03       Disclaimer: Converted document may not contain table formatting or lab diagrams. Please see [employer] for the authenticated document.

## 2021-05-28 NOTE — PROGRESS NOTES
Patient: YANDEL PHILLIPS     Acct: [id number]     Report: #[id number]  UNIT #: [id number]     : 1945    Encounter Date:2020  PRIMARY CARE: WILLIAM ROSS  ***Signed***  --------------------------------------------------------------------------------------------------------------------  TELEHEALTH NOTE      History of Present Illness            Chief Complaint: BREAST CANCER             Yandel Phillips is presenting for evaluation via Telehealth visit. Verbal consent     obtained before beginning visit.            Provider spent (35) minutes with the patient during telehealth visit.            The following staff were present during the visit: (Ian Manzo RN)                         Past Med History      Ms. Phillips is a 74-year-old female who was referred to me by Dr. Simon at     the patient was diagnosed with invasive ductal carcinoma of the left breast.            The breast mass was initially noted on screening mammogram.  Diagnostic     mammogram and ultrasound confirmed a 2.7 x 2.5 cm mass at the 3 o'clock     position.  She underwent biopsy on 2020.  Pathology was positive for     invasive ductal carcinoma, estrogen receptor positive (75%, moderate),     progesterone receptor positive (40%, moderate) HER-2 equivocal (2+ by IHC), HER-    2 FISH positive, Ki-67 55%.            Patient states her  will get out of rehab in 2-1/2 to 3 months and she     will need to be home to take care of him.  She would like the majority of her     treatment done by that time but will do it if she needs to to make it work.            Patient reports that her father  from some type of bone marrow related     cancer and her paternal aunt had leukemia.  Her mother was diagnosed with breast    cancer at the age of 68 and her sister had a diagnosis of colon cancer.  We     discussed the possibility of genetic testing but the patient is concerned mostly    about the cost at this time.  She feels  like if there is not a high likelihood     of a genetic cause then she would like to forego the test until we know more     about her plan of care.      Overview of Symptoms      I contacted the patient today regarding results of her HER-2 FISH testing.  This    test showed that HER-2 was amplified which means that she will need chemotherapy    with Herceptin.  We discussed all the possibilities of her treatment.  For she     was concerned about the decision for lumpectomy versus mastectomy.  I reassured     her that a lumpectomy is just as good as mastectomy as long as she undergoes the    necessary radiation afterwards.  She is also concerned about her ability to take    care of her  and the time involved with treatment.  Her  just got     out of the ICU about a week ago and is in a rehab facility.  She says the insur    lemuel is not paying for rehabilitation but she will pay on her own to make sure     that he stays there while she completes treatment.  She is willing to get     chemotherapy and understands that it will be 6 cycles prior to her surgery.  Her    son was on the phone with her as well but did not ask any questions.  We     discussed all possibilities with her chemotherapy.  She will come in for [facility] next week.  She was originally scheduled to have her lumpectomy on     4/21/2020.  However, I have already discussed with Dr. Simon that we will     cancel the surgery for now but have a port placed on that day.  She will also     need labs on that day.            Plan/Instructions            * Plan Of Care: (Hormone receptor positive, HER-2 positive breast cancer)      * Had extensive discussion with the patient and her son regarding her test       results and plan for treatment.  She understands that we will refer her for       port placement which Dr. Simon will do on 4/21/2020 instead of breast       surgery.  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  If she has not had labs by then, I will get them the day of her      teaching.  I will plan for 6 cycles of TCH prior to surgery.  Due to her age I      will reduce the dose of the carboplatin to an AUC of 4 and plan to give her       Neulasta.  I also mentioned the need for Herceptin to continue for 1 year       treatment versus TDM 1 if there is residual cancer.            * Chronic conditions reviewed and taken into consideration for today's treatment      plan. This includes Eliquis for her history of Afib.       * Patient instructed to seek medical attention urgently for new or worsening       symptoms.      * Patient was educated/instructed on their diagnosis, treatment and medications       prior to discharge from the clinic today.      Codes:  Other (35 min)            Electronically signed by ARABELLA FISH  04/16/2020 16:59       Disclaimer: Converted document may not contain table formatting or lab diagrams. Please see [employer] for the authenticated document.

## 2021-05-28 NOTE — PROGRESS NOTES
Patient: YANDEL PHILLIPS     Acct: [id number]     Report: #[id number]  UNIT #: [id number]     : 1945    Encounter Date:2020  PRIMARY CARE: WILLIAM ROSS  ***Signed***  --------------------------------------------------------------------------------------------------------------------  NURSE INTAKE      Visit Type      Established Patient Visit            Chief Complaint      BREAST CANCER; HERE FOR [place]            Referring Provider/Copies To      Referring Provider:  ASHLIE DE LA TORRE MD      Primary Care Provider:  WILLIAM ROSS      Copies To:   WILLIAM ROSS            History and Present Illness      Past Oncology Illness History      Ms. Phillips is a 74-year-old female who was referred to me by Dr. Simon at     the patient was diagnosed with invasive ductal carcinoma of the left breast.            The breast mass was initially noted on screening mammogram.  Diagnostic     mammogram and ultrasound confirmed a 2.7 x 2.5 cm mass at the 3 o'clock     position.  She underwent biopsy on 2020.  Pathology was positive for     invasive ductal carcinoma, estrogen receptor positive (75%, moderate), p    rogesterone receptor positive (40%, moderate) HER-2 equivocal (2+ by IHC), HER-2    FISH positive, Ki-67 55%.            ECHO Global Hypokinesis 45-50%            Cycle 1 of neoadjuvant TCH on 2020      Cycle 2 of TCH on 20 (60mg/kg Taxol)       Cycle 3 on 2020            HPI - Oncology Interim      Patient comes in today for her third cycle of chemotherapy.  She says that her     hands are peeling and somewhat red and swollen.  She also has some redness and     swelling of her feet.  She also has somewhat worsening neuropathy of her hands     and feet.  She noticed her gums were bleeding when brushing her teeth.  This     caused her to be significantly nauseous.  She also feels shortness of breath and    fatigue.            We discussed her premedications and found that she was  not getting oral     dexamethasone for home.            ECOG Performance Status      1            Most Recent Lab Findings      Laboratory Tests      20 08:53            PAST, FAMILY   Past Medical History      Past Medical History:  Heart Attack, Hypertension      Hematology/Oncology (F):  Breast Cancer            Past Surgical History      VAD Placement            Hernia repair            Family History            Father  from bone marrow cancer, paternal aunt with leukemia      Mother diagnosed with breast cancer at age 68      Sister diagnosed with colon cancer            Social History      Marital Status:        Lives independently:  No            Tobacco Use      Tobacco status:  Never smoker            Alcohol Use      Alcohol intake:  None            Substance Use      Substance use:  Denies use            REVIEW OF SYSTEMS      General:  Admits: Fatigue;          Denies: Appetite Change, Fever, Night Sweats, Weight Gain, Weight Loss      Other      PEELING ON THE PALMS OF BOTH HANDS, REDNESS AND SWELLING IN HANDS AND FEET      Eye:  Denies Blurred Vision, Denies Corrective Lenses, Denies Diplopia, Denies     Vision Changes      ENT:  Denies Headache, Denies Hearing Loss, Denies Hoarseness, Denies Sore     Throat      Cardiovascular:  Denies Chest Pain, Denies Palpitations      Respiratory:  Denies: Cough, Coughing Blood, Productive Cough, Shortness of Air,    Wheezing      Gastrointestinal:  Denies Bloody Stools, Denies Constipation, Denies Diarrhea, D    enies Nausea/Vomiting, Denies Problem Swallowing, Denies Unable to Control     Bowels      Genitourinary:  Denies Blood in Urine, Denies Incontinence, Denies Painful     Urination      Musculoskeletal:  Denies Back Pain, Denies Muscle Pain, Denies Painful Joints      Integumentary:  Denies Itching, Denies Lesions, Denies Rash      Neurologic:  Denies Dizziness; Admits Numbness\Tingling (HANDS AND FEET); Denies    Seizures      Psychiatric:   Denies Anxiety, Denies Depression      Endocrine:  Denies Cold Intolerance, Denies Heat Intolerance      Hematologic/Lymphatic:  Denies Bruising, Denies Bleeding, Denies Enlarged Lymph     Nodes      Reproductive:  Denies: Menopause, Heavy Periods, Pregnant, Still Menstruating            VITAL SIGNS AND SCORES      Vitals      Weight 230 lbs 9.619 oz / 104.6 kg      Temperature 97.3 F / 36.28 C - Temporal      Pulse 85      Respirations 17      Blood Pressure 98/57 Sitting      Pulse Oximetry 97%, ROOM AIR            Pain Score      Experiencing any pain?:  No      Pain Scale Used:  Numerical      Pain Intensity:  0            Fatigue Score      Experiencing any fatigue?:  Yes      Fatigue (0-10 scale):  5            EXAM      General: Alert, cooperative, no acute distress      Eyes: Anicteric sclera, PERRLA      HEENT: Oropharynx clear, no exudates      Respiratory: CTAB, normal respiratory effort      Abdomen: Normal active bowel sounds, no tenderness, no distention      Cardiovascular: RRR, no murmur, no peripheral edema      Skin: Normal tone, mild erythema of the palms with some peeling skin, no open     lesions      Psychiatric: Appropriate affect, intact judgment      Neurologic: No focal sensory or motor deficits, no weakness, numbness, dizziness      Musculoskeletal: Normal muscle strength, normal muscle tone      Extremities: No clubbing, cyanosis, or deformities            PREVENTION      Hx Influenza Vaccination:  Yes      Date Influenza Vaccine Given:  Oct 2, 2019      Influenza Vaccine Declined:  No      2 or More Falls Past Year?:  No      Fall Past Year with Injury?:  No      Hx Pneumococcal Vaccination:  Yes      Encouraged to follow-up with:  PCP regarding preventative exams.      Chart initiated by      KYLAH SHERWOOD CMA            ALLERGY/MEDS      Allergies      Coded Allergies:             MEPERIDINE (Verified  Allergy, Unknown, HIVES , 6/12/20)           MORPHINE (Verified  Allergy, Unknown,  NAUSEA /PASSED OUT, 6/12/20)            Medications      Last Reconciled on 6/21/20 20:36 by ARABELLA FISH      Dexamethasone (Decadron) 4 Mg Tablet      8 MG PO QDAY, #6 TAB 4 Refills         Prov: ARABELLA FISH         6/12/20       Potassium Chloride (K-Dur*) 10 Meq Tab.er.prt      20 MEQ PO QDAY, #60 TAB 1 Refill         Prov: ARABELLA FISH         6/12/20       Lisinopril-HCTZ 20-25 Mg (Lisinopril-HCTZ 20-25 Mg) 1 Each Tablet      1 TAB PO QDAY, #30 TAB 3 Refills         Prov: ARABELLA FISH         5/22/20       Multivitamin/Iron/Folic Acid (CENTRUM COMPLETE MULTIVIT TAB) Unknown Strength     Tablet      PO QDAY, #30 TAB 0 Refills         Reported         5/1/20       Prochlorperazine Maleate (Prochlorperazine Maleate) 10 Mg Tab      10 MG PO Q6H PRN for NAUSEA AND/OR VOMITING, #60 TAB 3 Refills         Prov: ARABELLA FISH         4/24/20       Ondansetron Odt (ONDANSETRON ODT) 8 Mg Tab.rapdis      8 MG PO Q8H PRN for NAUSEA, #30 TAB.RAPDIS 4 Refills         Prov: ARABELLA FISH         4/24/20       HYDROcodone-Acetaminophen 5-325 Mg (HYDROcodone-Acetaminophen 5-325 Mg) 1 Each     Tablet      2 TAB PO Q4H PRN for PAIN, #20 TAB         Prov: ASHLIE DE LA TORRE MD         4/21/20       Calcium Citrate (Citracal) 200 Mg Tablet      200 MG PO QDAY for 30 Days, #30 TAB         Reported         4/10/20       Glucosamine/Msm/Chondroitin A (Condrolite) 1 Tab Tablet      1 TAB PO QDAY for 30 Days, #30 TAB         Reported         4/10/20       Metoprolol Succinate (Metoprolol Succinate) 50 Mg Tab.er.24h      75 MG PO HS, #30 TAB.SR.24H 0 Refills         Reported         4/10/20       Gabapentin (Gabapentin) 300 Mg Capsule      300 MG PO HS, #30 CAP 0 Refills         Reported         4/10/20       amLODIPine (amLODIPine) 2.5 Mg Tablet      2.5 MG PO HS, #30 TAB 0 Refills         Reported         4/9/20       Hctz (hydroCHLOROthiazide) 12.5 Mg Capsule      12.5 MG PO QDAY, #30 CAP 0 Refills         Reported          4/9/20       Lisinopril-HCTZ 10-12.5 Mg (Lisinopril-HCTZ 10-12.5 Mg) 1 Each Tablet      1 TAB PO QDAY, #30 TAB 0 Refills         Reported         4/9/20       Apixaban (Eliquis) 5 Mg Tablet      5 MG PO BID for 30 Days, #60 TAB         Reported         4/9/20      Medications Reviewed:  No Changes made to meds            IMPRESSION/PLAN      Impression      74-year-old female with HER-2 positive breast cancer currently getting     neoadjuvant TCH.            Diagnosis      Notes      New Medications      * Potassium Chloride (K-Dur*) 10 MEQ TAB.ER.PRT: 20 MEQ PO QDAY #60      * Dexamethasone (Decadron) 4 MG TABLET: 8 MG PO QDAY #6         Instructions: take 2 tabs (8mg) once daily for 3 days starting the day prior        to chemotherapy treatment.            Plan      HER-2 positive breast cancer: Patient is here today for her third cycle of     neoadjuvant chemotherapy with TCH.  I initially reduce the dose of carboplatin     to an AUC of 4 and did not give pertuzumab.  With the second cycle I decrease     the dose of docetaxel to 60 mg per metered squared.  Patient is also getting     Neulasta.  She is having difficulty tolerating chemotherapy with several side     effects including neuropathy and swelling of her hands and feet.  I discovered     that she is not taking dexamethasone the day prior or 2 days after chemotherapy     because she was never given the prescription.  We have rectified this mistake     and sent the prescription into her pharmacy with instructions to take it.  I     would not further reduce her chemotherapy since the steroid should mitigate many    of the symptoms.  If she continues to have difficulty with this cycle then he     may have to further reduce the chemotherapy by giving her paclitaxel and     Herceptin or have her proceed to surgery sooner than planned.            Hypokalemia: I reviewed her labs and they are adequate to proceed to treatment     today.  However her potassium  level is further decreased from 3.4 down to 3.2     despite a week's worth of potassium chloride after last cycle.  I will give her     20 mEq of KCl today and start her on 20 mEq of potassium chloride daily for 2     weeks.            Neuropathic pain: Patient's neuropathy has continued to worsen due to docetaxel.     I have already dose reduced from 75 to 60 mg per metered squared.  If the     patient continues to have worsening neuropathy I will switch to paclitaxel which    is better tolerated.            CHF: Patient's most recent echo shows an EF of 45 to 50%.  This puts her at     increased risk for further cardiac complications with Herceptin.  At this time     she does not appear to have worsening CHF but will continue to monitor closely.     I discussed her case with her cardiologist, Dr. Vazquez.  He is modifying her     cardiac medications.  We will continue to monitor with echocardiogram every 3     months.  Her next scheduled for 8/18/2020 at 1 PM.            Patient Education      Patient Education Provided:  Yes            Electronically signed by ARABELLA FISH  06/21/2020 20:36       Disclaimer: Converted document may not contain table formatting or lab diagrams. Please see [employer] for the authenticated document.

## 2021-05-28 NOTE — PROGRESS NOTES
Patient: YANDEL PHILLIPS     Acct: [id number]     Report: #[id number]  UNIT #: [id number]     : 1945    Encounter Date:2020  PRIMARY CARE: WILLIAM ROSS  ***Signed***  --------------------------------------------------------------------------------------------------------------------  NURSE INTAKE      Visit Type      Established Patient Visit            Chief Complaint      BREAST CA            Referring Provider/Copies To      Primary Care Provider:  WILLIAM ROSS      Copies To:   WILLIAM ROSS            History and Present Illness      Past Oncology Illness History      Ms. Phillips is a 74-year-old female who was referred to me by Dr. Simon at     the patient was diagnosed with invasive ductal carcinoma of the left breast.            The breast mass was initially noted on screening mammogram.  Diagnostic     mammogram and ultrasound confirmed a 2.7 x 2.5 cm mass at the 3 o'clock     position.  She underwent biopsy on 2020.  Pathology was positive for     invasive ductal carcinoma, estrogen receptor positive (75%, moderate),     progesterone receptor positive (40%, moderate) HER-2 equivocal (2+ by IHC), HER-    2 FISH positive, Ki-67 55%.            ECHO Global Hypokinesis 45-50%            Cycle 1 of neoadjuvant TCH on 2020 -AUC of 4      Cycle 2 of TCH on 20 (60mg/kg Taxol)       Cycle 3 on 2020      Cycle 4 on 2020 - further chemotherapy was halted due to patient     intolerance.            Left breast lumpectomy 2020: Patient had a residual 15 mm tumor, grade 2     with no associated DCIS.  Lymph vascular invasion was present.  Margins are     negative.  4 lymph nodes were examined and all 4 were found to be neck.  Final     stage [id number].            Since the patient had residual disease on lumpectomy her treatment was switched     to Kadcyla which she will give her 1 year.  First cycle on 10/20/2020.  Decrease    the dose of Kadcyla after her first cycle  due to nausea, fatigue, and decreased     appetite.  Cycle 2 on 11/10/2020.            After 2 cycles of Kadcyla patient was transitioned back 2 Herceptin due to     cytopenias.            HPI - Oncology Interim      Patient comes in today for her next cycle of Kadcyla.  She says that she     continues do see Dr. Vazquez.  She recently had an echocardiogram which said that     her heart muscle had gotten somewhat weaker but not significantly so.  She was     started on losartan 25 mg a day.  On review of her labs it was notable that her     ANC is 790 and a platelet count of 63.  She has already had a dose reduction of     Kadcyla.  She continues be very fatigued but says that her leg swelling is     improved.  I discussed switching her back the Herceptin.  The concern is that it    can continue 2 worsen her heart function.  The patient does not wish stop     therapy but also recognizes that she is having side effects from Kadcyla.            Clinical Staging       [id number]            ECOG Performance Status      1            Most Recent Lab Findings      Laboratory Tests      20 08:35            20 09:42            Laboratory Tests            Test       20      10:45             Ferritin       60 ng/mL      ()            PAST, FAMILY   Past Medical History      Past Medical History:  Heart Attack, Hypertension      Hematology/Oncology (F):  Breast Cancer            Past Surgical History      Lumpectomy (Left), VAD Placement            Hernia repair            Family History            Father  from bone marrow cancer, paternal aunt with leukemia      Mother diagnosed with breast cancer at age 68      Sister diagnosed with colon cancer            Social History      Lives independently:  No            Tobacco Use      Tobacco status:  Never smoker            Substance Use      Substance use:  Denies use            REVIEW OF SYSTEMS      General:  Admits: Fatigue;          Denies: Appetite  Change, Fever, Night Sweats, Weight Gain, Weight Loss      Eye:  Admits Corrective Lenses; Denies Blurred Vision, Denies Diplopia, Denies     Vision Changes      ENT:  Denies Headache, Denies Hearing Loss, Denies Hoarseness, Denies Sore     Throat      Cardiovascular:  Denies Chest Pain, Denies Palpitations      Respiratory:  Denies: Cough, Coughing Blood, Productive Cough, Shortness of Air,    Wheezing      Gastrointestinal:  Denies Bloody Stools, Denies Constipation, Denies Diarrhea,     Denies Nausea/Vomiting, Denies Problem Swallowing, Denies Unable to Control     Bowels      Genitourinary:  Denies Blood in Urine, Denies Incontinence, Denies Painful     Urination      Musculoskeletal:  Denies Back Pain, Denies Muscle Pain, Denies Painful Joints      Integumentary:  Denies Itching, Denies Lesions, Denies Rash      Neurologic:  Denies Dizziness, Denies Numbness\Tingling, Denies Seizures      Psychiatric:  Denies Anxiety, Denies Depression      Endocrine:  Denies Cold Intolerance, Denies Heat Intolerance      Hematologic/Lymphatic:  Denies Bruising, Denies Bleeding, Denies Enlarged Lymph     Nodes      Reproductive:  Denies: Menopause, Heavy Periods, Pregnant, Still Menstruating            VITAL SIGNS AND SCORES      Vitals      Weight 203 lbs 14.808 oz / 92.5 kg      Temperature 98.3 F / 36.83 C - Temporal      Pulse 72      Respirations 18      Blood Pressure 115/54 Sitting      Pulse Oximetry 97%, RM AIR            Pain Score      Experiencing any pain?:  No      Pain Scale Used:  Numerical      Pain Intensity:  0            Fatigue Score      Experiencing any fatigue?:  Yes      Fatigue (0-10 scale):  4            PT/OT Functional Screening      No needs identified            Speech Functional Screening      No needs identified            Rehab to be Ordered      Type of Referral to be Ordered:  No needs identified            EXAM      General: Alert, cooperative, no acute distress      Eyes: Anicteric sclera,  PERRLA      Breast: Patient's left nipple does appear too retracted disintegrate following     surgery, there is no associated mass or nipple discharge.  She has some     lymphedema around the left breast and into the arm      Respiratory: CTAB, normal respiratory effort      Abdomen: Normal active bowel sounds, no tenderness, no distention      Cardiovascular: RRR, no murmur, trace lower extremity edema      Skin: Normal tone, no rash, no lesions      Psychiatric: Appropriate affect, intact judgment      Neurologic: No focal sensory or motor deficits, no weakness, numbness, dizziness      Musculoskeletal: Normal muscle strength and tone      Extremities: No clubbing, cyanosis, or deformities            PREVENTION      Hx Influenza Vaccination:  No      Date Influenza Vaccine Given:  Oct 2, 2019      Influenza Vaccine Declined:  No      2 or More Falls in Past Year?:  No      Fall Past Year with Injury?:  No      Hx Pneumococcal Vaccination:  Yes      Encouraged to follow-up with:  PCP regarding preventative exams.      Chart initiated by      CARISSA CASTELLON MA            ALLERGY/MEDS      Allergies      Coded Allergies:             MEPERIDINE (Verified  Allergy, Unknown, HIVES , 12/1/20)           MORPHINE (Verified  Allergy, Unknown, NAUSEA /PASSED OUT, 12/1/20)            Medications      Last Reconciled on 12/20/20 18:16 by ARABELLA FISH      Spironolactone (Spironolactone*) 25 Mg Tablet      25 MG PO QDAY, #30 TAB 0 Refills         Reported         11/10/20       traMADol (Ultram) 50 Mg Tablet      50 MG PO Q6H PRN for PAIN, TAB 0 Refills         Reported         11/10/20       Potassium Chloride (Potassium Chloride) 10 Meq Capsule.er      20 MEQ PO HS, #60 CAP.ER 0 Refills         Prov: ARABELLA FISH         10/20/20       Prochlorperazine Maleate (Prochlorperazine Maleate) 10 Mg Tab      10 MG PO Q6H PRN for NAUSEA AND/OR VOMITING, #60 TAB 5 Refills         Prov: ARABELLA FISH         9/25/20       Ondansetron  Odt (ONDANSETRON ODT) 8 Mg Tab.rapdis      8 MG PO Q8H PRN for NAUSEA, #30 TAB.RAPDIS 5 Refills         Prov: ARABELLA FISH         9/25/20       Furosemide (Furosemide) 40 Mg Tablet      40 MG PO QDAY, #30 TAB 0 Refills         Reported         8/17/20       Carvedilol (Carvedilol) 25 Mg Tablet      25 MG PO BID, #60 TAB 0 Refills         Reported         8/17/20       Gabapentin (Gabapentin) 300 Mg Capsule      300 MG PO HS, #30 CAP 0 Refills         Reported         4/10/20       Apixaban (Eliquis) 5 Mg Tablet      5 MG PO BID for 30 Days, #60 TAB         Reported         4/9/20      Medications Reviewed:  No Changes made to meds            IMPRESSION/PLAN      Plan      HR +/HER-2 + left breast cancer: Patient completed 4 cycles of neoadjuvant TCH     but stopped chemotherapy early due to intolerance.  After lumpectomy on     8/18/2020 she had a 1.5 cm residual tumor.  She then started adjuvant Kadcyla     which had 2 be dose reduced and then stopped after 2 cycles due to cytopenias     and fatigue.  Patient will transition back the Herceptin today for cycle 3 of     neoadjuvant treatment.  We will attempt 2 complete a year of total therapy which    would take 13 cycles of adjuvant treatment.            CHF: Patient's ejection fraction is 45 - 50%.  She reports her recent echo shows    mild reduction in function.  Based on this information I contacted Dr. Vazquez     discussed her case.  He stated that she has very minimal reduction in cardiac     function and he does not recommend changing her treatment based on this.  For     that reason I will transition her back Herceptin but watch her closely for any     new signs of heart failure.  Patient will need a have an echocardiogram again in    3 months.  We will get the records of her recent echo from Dr. Vazquez's office.            Lymphedema: Patient has some swelling on the breast and left arm.  She continues    the follow-up with occupational therapy.             Neutropenia and thrombocytopenia: Secondary 2 Kadcyla.  We will transition from     this medication back Herceptin as indicated above.            Patient Education      Patient Education Provided:  Yes            Electronically signed by ARABELLA FISH  12/20/2020 18:16       Disclaimer: Converted document may not contain table formatting or lab diagrams. Please see [employer] for the authenticated document.

## 2021-05-28 NOTE — PROGRESS NOTES
Patient: YANDEL PHILLIPS     Acct: [id number]     Report: #[id number]  UNIT #: [id number]     : 1945    Encounter Date:2020  PRIMARY CARE: WILLIAM ROSS  ***Signed***  --------------------------------------------------------------------------------------------------------------------  NURSE INTAKE      Visit Type      Established Patient Visit            Chief Complaint      BREAST CANCER            History and Present Illness      Past Oncology Illness History      Ms. Phillips is a 74-year-old female who was referred to me by Dr. Simon at     the patient was diagnosed with invasive ductal carcinoma of the left breast.            The breast mass was initially noted on screening mammogram.  Diagnostic     mammogram and ultrasound confirmed a 2.7 x 2.5 cm mass at the 3 o'clock     position.  She underwent biopsy on 2020.  Pathology was positive for     invasive ductal carcinoma, estrogen receptor positive (75%, moderate),     progesterone receptor positive (40%, moderate) HER-2 equivocal (2+ by IHC), FISH    analysis pending, Ki-67 55%.            Patient comes in today to establish care.  We discussed all the possibilities     for treatment.  She understands of the most important factor is the HER-2 FISH     test that is pending.  She is very hopeful that she does not need to have     chemotherapy since her  recently had a leg amputation and is in rehab.      He will get out of rehab in 2-1/2 to 3 months and she will need to be home to     take care of him.  With a long discussion regarding implications of chemotherapy    as well as the timing.  We also discussed the necessity of radiation and     endocrine therapy as well.            She was scheduled for an MRI of her breast on 4/15/2020 due to the proximity of     the mass to the chest wall.  She has a tentative plan for surgery on 2020     provided she does not have HER-2 positive disease.            Patient reports that her  father  from some type of bone marrow related     cancer and her paternal aunt had leukemia.  Her mother was diagnosed with breast    cancer at the age of 68 and her sister had a diagnosis of colon cancer.  We     discussed the possibility of genetic testing but the patient is concerned mostly    about the cost at this time.  She feels like if there is not a high likelihood     of a genetic cause then she would like to forego the test until we know more     about her plan of care.            ECOG Performance Status      0            REVIEW OF SYSTEMS      General:  Denies: Appetite Change, Fatigue, Fever, Night Sweats, Weight Gain,     Weight Loss      Eye:  Denies Blurred Vision, Denies Corrective Lenses, Denies Diplopia, Denies     Vision Changes      ENT:  Denies Headache, Denies Hearing Loss, Denies Hoarseness, Denies Sore     Throat      Cardiovascular:  Denies Chest Pain, Denies Palpitations      Respiratory:  Denies: Cough, Coughing Blood, Productive Cough, Shortness of Air,    Wheezing      Gastrointestinal:  Denies Bloody Stools, Denies Constipation, Denies Diarrhea,     Denies Nausea/Vomiting, Denies Problem Swallowing, Denies Unable to Control     Bowels      Genitourinary:  Denies Blood in Urine, Denies Incontinence, Denies Painful     Urination      Musculoskeletal:  Denies Back Pain, Denies Muscle Pain, Denies Painful Joints      Integumentary:  Denies Itching, Denies Lesions, Denies Rash      Neurologic:  Denies Dizziness, Denies Numbness\Tingling, Denies Seizures      Psychiatric:  Denies Anxiety, Denies Depression      Endocrine:  Denies Cold Intolerance, Denies Heat Intolerance      Hematologic/Lymphatic:  Denies Bruising, Denies Bleeding, Denies Enlarged Lymph     Nodes      Reproductive:  Denies: Menopause, Heavy Periods, Pregnant, Still Menstruating            VITAL SIGNS AND SCORES      Vitals      Height 5 ft 6 in / 167.64 cm      Weight 238 lbs  / 107.872715 kg      BSA 2.15 m2      BMI  38.4 kg/m2      Temperature 98.1 F / 36.72 C - Temporal      Pulse 78      Respirations 18      Blood Pressure 117/64 Sitting, Left Arm      Pulse Oximetry 97%, ROOM AIR            Pain Score      Experiencing any pain?:  No      Pain Scale Used:  Numerical      Pain Intensity:  3            Fatigue Score      Experiencing any fatigue?:  No      Fatigue (0-10 scale):  0 (none)            EXAM      General: Alert, cooperative, no acute distress      Eyes: Anicteric sclera, PERRLA      HEENT: Oropharynx clear, no exudates      Respiratory: CTAB, normal respiratory effort      Abdomen: Normal active bowel sounds, no tenderness, no distention      Cardiovascular: RRR, no murmur, no peripheral edema      Skin: Normal tone, no rash, no lesions      Psychiatric: Appropriate affect, intact judgment      Neurologic: No focal sensory or motor deficits, no weakness, numbness, dizziness      Musculoskeletal: Normal muscle strength, normal muscle tone      Extremities: No clubbing, cyanosis, or deformities            PREVENTION      Hx Influenza Vaccination:  Yes      Date Influenza Vaccine Given:  Oct 2, 2019      2 or More Falls Past Year?:  No      Fall Past Year with Injury?:  No      Hx Pneumococcal Vaccination:  Yes      Encouraged to follow-up with:  PCP regarding preventative exams.      Chart initiated by      FELECIA MINOR [facility]            ALLERGY/MEDS      Allergies      Coded Allergies:             Demerol (Verified  Allergy, 4/9/20)           Morphine (Verified  Allergy, 4/9/20)            Medications      Last Reconciled on 4/9/20 16:56 by ARABELLA FISH      amLODIPine (amLODIPine) 2.5 Mg Tablet      2.5 MG PO QDAY, #30 TAB 0 Refills         Reported         4/9/20       Metoprolol Tartrate (Metoprolol Tartrate) 25 Mg Tablet      50 MG PO QDAY, #60 TAB 0 Refills         Reported         4/9/20       Gabapentin (Gabapentin) 250 Mg/5 Ml Solution      300 MG PO HS, #180 ML 0 Refills         Reported         4/9/20        Hctz (hydroCHLOROthiazide) 12.5 Mg Capsule      12.5 MG PO QDAY, #30 CAP 0 Refills         Reported         4/9/20       Lisinopril-HCTZ 10-12.5 Mg (Lisinopril-HCTZ 10-12.5 Mg) 1 Each Tablet      1 TAB PO QDAY, #30 TAB 0 Refills         Reported         4/9/20       Apixaban (Eliquis) 5 Mg Tablet      5 MG PO BID for 30 Days, #60 TAB         Reported         4/9/20      Medications Reviewed:  Changes made to meds            IMPRESSION/PLAN      Impression      74-year-old female with a new diagnosis of invasive ductal carcinoma of the left    breast.            Diagnosis      Notes      New Medications      * Apixaban (Eliquis) 5 MG TABLET: 5 MG PO BID 30 Days #60      * Lisinopril-HCTZ 10-12.5 Mg 1 EACH TABLET: 1 TAB PO QDAY #30      * HCTZ (hydroCHLOROthiazide) 12.5 MG CAPSULE: 12.5 MG PO QDAY #30      * Gabapentin 250 MG/5 ML SOLUTION: 300 MG PO HS #180      * Metoprolol Tartrate 25 MG TABLET: 50 MG PO QDAY #60      * amLODIPine 2.5 MG TABLET: 2.5 MG PO QDAY #30            Plan      Invasive ductal carcinoma: Patient has a new diagnosis of left breast cancer,     ER/DC positive, HER-2 equivocal.  HER-2 FISH is pending.  Clinical stage T2N0.      We had a long discussion regarding the possibilities of treatment including the     need for chemotherapy.  I will schedule her for a follow-up phone call in 1 week    to discuss the results of the FISH testing and make further plans for treatment.     If HER-2 FISH test is negative she will proceed to surgery on 4/21/2020 and     will need an Oncotype test after.  Dr. Simon is ordered an MRI of the     breast to help plan for surgery.  If the HER-2 FISH test is positive then she     will need neoadjuvant chemotherapy instead.            Genetic risk: Given the patient's age of 74 is unlikely she has an underlying     genetic risk since this is her first cancer.  However her mom had a diagnosis of    breast cancer and her sister had colon cancer so it is not  absolutely     unreasonable.  I did offer genetic testing to the patient but she would like to     hold off until she knows her need for chemotherapy because she is concerned     about the cost of her treatment.  She does have 1 son so she would like to know     if there is any genetic risk factor that she could have passed on to him.            Patient Education      Patient Education Provided:  Yes            Electronically signed by ARABELLA FISH  04/09/2020 16:56       Disclaimer: Converted document may not contain table formatting or lab diagrams. Please see [employer] for the authenticated document.

## 2021-05-28 NOTE — PROGRESS NOTES
Patient: YANDEL PHILLIPS     Acct: [id number]     Report: #[id number]  UNIT #: [id number]     : 1945    Encounter Date:2020  PRIMARY CARE: WILLIAM ROSS  ***Signed***  --------------------------------------------------------------------------------------------------------------------  TELEHEALTH NOTE      History of Present Illness            Chief Complaint: (BREAST CANCER (CHEMO EDUCATION))            Yandel Phillips is presenting for evaluation via Telehealth visit. Verbal consent     obtained before beginning visit.            Provider spent (number of mins) minutes with the patient during telehealth     visit.            The following staff were present during the visit: ()                         Past Med History      Ms. Phillips is a 74-year-old female who was referred to me by Dr. Simon at     the patient was diagnosed with invasive ductal carcinoma of the left breast.            The breast mass was initially noted on screening mammogram.  Diagnostic     mammogram and ultrasound confirmed a 2.7 x 2.5 cm mass at the 3 o'clock     position.  She underwent biopsy on 2020.  Pathology was positive for     invasive ductal carcinoma, estrogen receptor positive (75%, moderate),     progesterone receptor positive (40%, moderate) HER-2 equivocal (2+ by IHC), FISH     analysis pending, Ki-67 55%.            Patient comes in today to establish care.  We discussed all the possibilities     for treatment.  She understands of the most important factor is the HER-2 FISH     test that is pending.  She is very hopeful that she does not need to have     chemotherapy since her  recently had a leg amputation and is in rehab.      He will get out of rehab in 2-1/2 to 3 months and she will need to be home to     take care of him.  With a long discussion regarding implications of chemotherapy     as well as the timing.  We also discussed the necessity of radiation and     endocrine therapy as well.             She was scheduled for an MRI of her breast on 4/15/2020 due to the proximity of     the mass to the chest wall.  She has a tentative plan for surgery on 2020     provided she does not have HER-2 positive disease.            Patient reports that her father  from some type of bone marrow related     cancer and her paternal aunt had leukemia.  Her mother was diagnosed with breast     cancer at the age of 68 and her sister had a diagnosis of colon cancer.  We     discussed the possibility of genetic testing but the patient is concerned mostly     about the cost at this time.  She feels like if there is not a high likelihood     of a genetic cause then she would like to forego the test until we know more     about her plan of care.      Overview of Symptoms      1) Breast cancer:  Left breast: 3:00 position. 2.7 x 2.5 cm in size. breast     biopsy: (20): Path: invasive ductal carcinoma, ER (+), 75%, OH (+) 40%, Her     2 mely FISH positive, Ki-67 55%.             2) Chemotherapy education visit: Carboplatin, Docetaxel x 6 cycles + Herceptin.     She will receive Neulasta support.             Patient was given education on side effects of chemotherapy regarding risk for     infection, neutropenic fevers, dehydration, fatigue, peripheral neuropathy,     diet, electrolyte abnormalities, emesis prevention.             She was educated regarding Zofran, Compazine prescriptions and how to take.            Allergies/Medications      Allergies:        Coded Allergies:             MEPERIDINE (Verified  Allergy, Unknown, HIVES , 4/10/20)           MORPHINE (Verified  Allergy, Unknown, NAUSEA /PASSED OUT, 4/10/20)      Medications    Last Reconciled on 20 16:56 by ARABELLA FISH      Prochlorperazine Maleate (Prochlorperazine Maleate) 10 Mg Tab      10 MG PO Q6H PRN for NAUSEA AND/OR VOMITING, #60 TAB 3 Refills         Prov: ARABELLA FISH         20       Ondansetron Odt (ONDANSETRON ODT) 8 Mg  Tab.rapdis      8 MG PO Q8H PRN for NAUSEA, #30 TAB.RAPDIS 4 Refills         Prov: ARABELLA FISH         4/24/20       HYDROcodone-Acetaminophen 5-325 Mg (HYDROcodone-Acetaminophen 5-325 Mg) 1 Each     Tablet      2 TAB PO Q4H PRN for PAIN, #20 TAB         Prov: ASHLIE DE LA TORRE MD         4/21/20       Calcium Citrate (Citracal) 200 Mg Tablet      200 MG PO QDAY for 30 Days, #30 TAB         Reported         4/10/20       Glucosamine/Msm/Chondroitin A (Condrolite) 1 Tab Tablet      1 TAB PO QDAY for 30 Days, #30 TAB         Reported         4/10/20       Metoprolol Succinate (Metoprolol Succinate) 50 Mg Tab.er.24h      75 MG PO HS, #30 TAB.SR.24H 0 Refills         Reported         4/10/20       Gabapentin (Gabapentin) 300 Mg Capsule      300 MG PO HS, #30 CAP 0 Refills         Reported         4/10/20       amLODIPine (amLODIPine) 2.5 Mg Tablet      2.5 MG PO HS, #30 TAB 0 Refills         Reported         4/9/20       Hctz (hydroCHLOROthiazide) 12.5 Mg Capsule      12.5 MG PO QDAY, #30 CAP 0 Refills         Reported         4/9/20       Lisinopril-HCTZ 10-12.5 Mg (Lisinopril-HCTZ 10-12.5 Mg) 1 Each Tablet      1 TAB PO QDAY, #30 TAB 0 Refills         Reported         4/9/20       Apixaban (Eliquis) 5 Mg Tablet      5 MG PO BID for 30 Days, #60 TAB         Reported         4/9/20            Plan/Instructions      1) Port placement completed.             Has follow up appointment on April 30 with Dr. Fish.             1st cycle to start on May 1.             She was given a chance to ask questions. All questions were answered     appropriately.             Call with any questions or concerns.             * Plan Of Care: ()            * Chronic conditions reviewed and taken into consideration for today's treatment       plan.      * Patient instructed to seek medical attention urgently for new or worsening       symptoms.      * Patient was educated/instructed on their diagnosis, treatment and medications        prior to discharge from the clinic today.      Codes:  Other (60 minutes of chemotherapy educuation by telehealth visit. )            Copies To:      WILLIAM ROSS            Electronically signed by HOSSEIN PERLA onc  04/29/2020 17:16       Disclaimer: Converted document may not contain table formatting or lab diagrams. Please see [employer] [facility] for the authenticated document.

## 2021-06-01 ENCOUNTER — TRANSCRIBE ORDERS (OUTPATIENT)
Dept: ADMINISTRATIVE | Facility: HOSPITAL | Age: 76
End: 2021-06-01

## 2021-06-01 DIAGNOSIS — R92.8 ABNORMAL MAMMOGRAM: Primary | ICD-10-CM

## 2021-06-18 ENCOUNTER — HOSPITAL ENCOUNTER (OUTPATIENT)
Dept: MAMMOGRAPHY | Facility: HOSPITAL | Age: 76
Discharge: HOME | End: 2021-06-18

## 2021-06-18 DIAGNOSIS — R92.8 ABNORMAL MAMMOGRAM: ICD-10-CM

## 2021-08-17 PROBLEM — G62.9 NEUROPATHY: Status: ACTIVE | Noted: 2021-04-27

## 2021-08-17 PROBLEM — C50.919 MALIGNANT TUMOR OF BREAST: Status: ACTIVE | Noted: 2020-05-08

## 2021-08-17 PROBLEM — I48.91 ATRIAL FIBRILLATION: Status: ACTIVE | Noted: 2017-08-17

## 2021-08-17 PROBLEM — I50.9 CONGESTIVE HEART FAILURE: Status: ACTIVE | Noted: 2019-06-26

## 2021-08-17 PROBLEM — I10 HYPERTENSIVE DISORDER: Status: ACTIVE | Noted: 2017-08-17

## 2021-08-17 PROBLEM — M54.9 CHRONIC BACK PAIN: Status: ACTIVE | Noted: 2017-08-17

## 2021-08-17 PROBLEM — G89.29 CHRONIC BACK PAIN: Status: ACTIVE | Noted: 2017-08-17

## 2021-08-17 PROBLEM — L30.9 ECZEMA: Status: ACTIVE | Noted: 2017-08-17

## 2021-08-17 RX ORDER — GABAPENTIN 300 MG/1
CAPSULE ORAL
COMMUNITY
End: 2021-10-19 | Stop reason: SDUPTHER

## 2021-08-17 RX ORDER — LOSARTAN POTASSIUM 25 MG/1
TABLET ORAL
COMMUNITY
End: 2022-03-28

## 2021-08-17 RX ORDER — TRAMADOL HYDROCHLORIDE 50 MG/1
50 TABLET ORAL 2 TIMES DAILY
COMMUNITY

## 2021-08-17 RX ORDER — ANASTROZOLE 1 MG/1
TABLET ORAL
COMMUNITY
End: 2022-02-17 | Stop reason: SDUPTHER

## 2021-08-17 RX ORDER — TRIAMCINOLONE ACETONIDE 1 MG/G
CREAM TOPICAL
COMMUNITY
End: 2021-08-19 | Stop reason: SDUPTHER

## 2021-08-17 RX ORDER — HYDROCHLOROTHIAZIDE 12.5 MG/1
CAPSULE, GELATIN COATED ORAL
COMMUNITY
End: 2021-08-19 | Stop reason: SDUPTHER

## 2021-08-17 RX ORDER — MELOXICAM 15 MG/1
TABLET ORAL
COMMUNITY
End: 2021-08-19 | Stop reason: SDUPTHER

## 2021-08-17 RX ORDER — CARVEDILOL 25 MG/1
TABLET ORAL EVERY 12 HOURS SCHEDULED
COMMUNITY
End: 2021-09-17 | Stop reason: SDUPTHER

## 2021-08-17 RX ORDER — FUROSEMIDE 40 MG/1
40 TABLET ORAL DAILY
COMMUNITY
End: 2022-04-12

## 2021-08-17 RX ORDER — SPIRONOLACTONE 25 MG/1
TABLET ORAL EVERY 12 HOURS SCHEDULED
COMMUNITY
End: 2021-09-17 | Stop reason: SDUPTHER

## 2021-08-19 ENCOUNTER — LAB (OUTPATIENT)
Dept: ONCOLOGY | Facility: HOSPITAL | Age: 76
End: 2021-08-19

## 2021-08-19 ENCOUNTER — OFFICE VISIT (OUTPATIENT)
Dept: ONCOLOGY | Facility: HOSPITAL | Age: 76
End: 2021-08-19

## 2021-08-19 VITALS
OXYGEN SATURATION: 96 % | DIASTOLIC BLOOD PRESSURE: 56 MMHG | HEIGHT: 64 IN | TEMPERATURE: 97.1 F | WEIGHT: 194.22 LBS | BODY MASS INDEX: 33.16 KG/M2 | RESPIRATION RATE: 18 BRPM | HEART RATE: 79 BPM | SYSTOLIC BLOOD PRESSURE: 122 MMHG

## 2021-08-19 DIAGNOSIS — C50.912 MALIGNANT NEOPLASM OF LEFT BREAST IN FEMALE, ESTROGEN RECEPTOR POSITIVE, UNSPECIFIED SITE OF BREAST (HCC): Primary | ICD-10-CM

## 2021-08-19 DIAGNOSIS — D50.8 OTHER IRON DEFICIENCY ANEMIA: ICD-10-CM

## 2021-08-19 DIAGNOSIS — Z17.0 MALIGNANT NEOPLASM OF LEFT BREAST IN FEMALE, ESTROGEN RECEPTOR POSITIVE, UNSPECIFIED SITE OF BREAST (HCC): Primary | ICD-10-CM

## 2021-08-19 LAB
ALBUMIN SERPL-MCNC: 4.1 G/DL (ref 3.5–5.2)
ALBUMIN/GLOB SERPL: 1.3 G/DL
ALP SERPL-CCNC: 225 U/L (ref 39–117)
ALT SERPL W P-5'-P-CCNC: 13 U/L (ref 1–33)
ANION GAP SERPL CALCULATED.3IONS-SCNC: 10.9 MMOL/L (ref 5–15)
AST SERPL-CCNC: 27 U/L (ref 1–32)
BASOPHILS # BLD AUTO: 0.02 10*3/MM3 (ref 0–0.2)
BASOPHILS NFR BLD AUTO: 0.5 % (ref 0–1.5)
BILIRUB SERPL-MCNC: 1.6 MG/DL (ref 0–1.2)
BUN SERPL-MCNC: 33 MG/DL (ref 8–23)
BUN/CREAT SERPL: 31.7 (ref 7–25)
CALCIUM SPEC-SCNC: 10.4 MG/DL (ref 8.6–10.5)
CHLORIDE SERPL-SCNC: 97 MMOL/L (ref 98–107)
CO2 SERPL-SCNC: 26.1 MMOL/L (ref 22–29)
CREAT SERPL-MCNC: 1.04 MG/DL (ref 0.57–1)
DEPRECATED RDW RBC AUTO: 57.2 FL (ref 37–54)
EOSINOPHIL # BLD AUTO: 0.1 10*3/MM3 (ref 0–0.4)
EOSINOPHIL NFR BLD AUTO: 2.7 % (ref 0.3–6.2)
ERYTHROCYTE [DISTWIDTH] IN BLOOD BY AUTOMATED COUNT: 16.8 % (ref 12.3–15.4)
FERRITIN SERPL-MCNC: 23.93 NG/ML (ref 13–150)
GFR SERPL CREATININE-BSD FRML MDRD: 52 ML/MIN/1.73
GLOBULIN UR ELPH-MCNC: 3.1 GM/DL
GLUCOSE SERPL-MCNC: 86 MG/DL (ref 65–99)
HCT VFR BLD AUTO: 35.2 % (ref 34–46.6)
HGB BLD-MCNC: 10.9 G/DL (ref 12–15.9)
HYPOCHROMIA BLD QL: NORMAL
IMM GRANULOCYTES # BLD AUTO: 0 10*3/MM3 (ref 0–0.05)
IMM GRANULOCYTES NFR BLD AUTO: 0 % (ref 0–0.5)
IRON 24H UR-MRATE: 32 MCG/DL (ref 37–145)
IRON SATN MFR SERPL: 5 % (ref 20–50)
LARGE PLATELETS: NORMAL
LYMPHOCYTES # BLD AUTO: 0.67 10*3/MM3 (ref 0.7–3.1)
LYMPHOCYTES NFR BLD AUTO: 18.4 % (ref 19.6–45.3)
MCH RBC QN AUTO: 28.8 PG (ref 26.6–33)
MCHC RBC AUTO-ENTMCNC: 31 G/DL (ref 31.5–35.7)
MCV RBC AUTO: 93.1 FL (ref 79–97)
MICROCYTES BLD QL: NORMAL
MONOCYTES # BLD AUTO: 0.35 10*3/MM3 (ref 0.1–0.9)
MONOCYTES NFR BLD AUTO: 9.6 % (ref 5–12)
NEUTROPHILS NFR BLD AUTO: 2.51 10*3/MM3 (ref 1.7–7)
NEUTROPHILS NFR BLD AUTO: 68.8 % (ref 42.7–76)
NRBC BLD AUTO-RTO: 0 /100 WBC (ref 0–0.2)
OVALOCYTES BLD QL SMEAR: NORMAL
PLATELET # BLD AUTO: 99 10*3/MM3 (ref 140–450)
PMV BLD AUTO: 11.4 FL (ref 6–12)
POTASSIUM SERPL-SCNC: 4.1 MMOL/L (ref 3.5–5.2)
PROT SERPL-MCNC: 7.2 G/DL (ref 6–8.5)
RBC # BLD AUTO: 3.78 10*6/MM3 (ref 3.77–5.28)
SMALL PLATELETS BLD QL SMEAR: NORMAL
SODIUM SERPL-SCNC: 134 MMOL/L (ref 136–145)
TIBC SERPL-MCNC: 605 MCG/DL (ref 298–536)
TRANSFERRIN SERPL-MCNC: 406 MG/DL (ref 200–360)
WBC # BLD AUTO: 3.65 10*3/MM3 (ref 3.4–10.8)
WBC MORPH BLD: NORMAL

## 2021-08-19 PROCEDURE — 99215 OFFICE O/P EST HI 40 MIN: CPT | Performed by: INTERNAL MEDICINE

## 2021-08-19 PROCEDURE — 80053 COMPREHEN METABOLIC PANEL: CPT

## 2021-08-19 PROCEDURE — 83540 ASSAY OF IRON: CPT

## 2021-08-19 PROCEDURE — 82728 ASSAY OF FERRITIN: CPT

## 2021-08-19 PROCEDURE — 82607 VITAMIN B-12: CPT

## 2021-08-19 PROCEDURE — 84466 ASSAY OF TRANSFERRIN: CPT

## 2021-08-19 PROCEDURE — 36415 COLL VENOUS BLD VENIPUNCTURE: CPT

## 2021-08-19 PROCEDURE — 85007 BL SMEAR W/DIFF WBC COUNT: CPT

## 2021-08-19 PROCEDURE — 85025 COMPLETE CBC W/AUTO DIFF WBC: CPT

## 2021-08-19 PROCEDURE — G0463 HOSPITAL OUTPT CLINIC VISIT: HCPCS

## 2021-08-19 PROCEDURE — 82746 ASSAY OF FOLIC ACID SERUM: CPT

## 2021-08-19 RX ORDER — ACETAMINOPHEN 500 MG
500 TABLET ORAL EVERY 6 HOURS PRN
COMMUNITY

## 2021-08-19 NOTE — PROGRESS NOTES
Patient  Yareli Phillips    [facility] HEMATOLOGY & ONCOLOGY    Chief Complaint  No chief complaint on file.    Referring Provider: KISHAN Hyatt  PCP: Evangelina Bajwa APRN    Subjective          Oncology/Hematology History Overview Note   HER2+ Breast Cancer:            The breast mass was initially noted on screening mammogram.  Diagnostic     mammogram and ultrasound confirmed a 2.7 x 2.5 cm mass at the 3 o'clock     position.  She underwent biopsy on 4/6/2020.  Pathology was positive for     invasive ductal carcinoma, estrogen receptor positive (75%, moderate),     progesterone receptor positive (40%, moderate) HER-2 equivocal (2+ by IHC), HER-    2 FISH positive, Ki-67 55%.            ECHO Global Hypokinesis 45-50%            Cycle 1 of neoadjuvant TCH on 4/30/2020 -AUC of 4      Cycle 2 of TCH on 5/22/20 (60mg/kg Taxol)       Cycle 3 on 6/12/2020      Cycle 4 on 7/2/2020 - further chemotherapy was halted due to patient     intolerance.            Left breast lumpectomy 8/18/2020: Patient had a residual 15 mm tumor, grade 2     with no associated DCIS.  Lymph vascular invasion was present.  Margins are     negative.  4 lymph nodes were examined and all 4 were found to be neck.  Final     stage [id number].            Since the patient had residual disease on lumpectomy her treatment was switched     to Kadcyla which she will give her 1 year.  First cycle on 10/20/2020.  Decrease    the dose of Kadcyla after her first cycle due to nausea, fatigue, and decreased     appetite.  Cycle 2 on 11/10/2020.            After 2 cycles of Kadcyla patient was transitioned back 2 Herceptin due to     cytopenias. C7 with Herceptin on 12/1/20. Transitioned back to Kadcyla at a     reduced dose after developing LE edema of Herceptin but developed further     swelling and stopped anticancer therapy entirely.            Started anastrozole in January 2021         Malignant tumor of breast  (CMS/[facility])   5/8/2020 Initial Diagnosis    Malignant tumor of breast (CMS/[facility])         History of Present Illness  Patient comes in today for follow-up of her breast cancer.  I noted that she has lost a significant amount of weight.  She says she lost most of her weight during her chemotherapy treatment but is gained a few pounds back.  She became tearful when talking about taking care of her .  He has lost both legs to amputation and she is his primary caretaker.  The 2 of them try to keep good spirits about the issues.  She did realize in order to take care of him she might start eating better and therefore has turned things around and is feeling like she has some increased energy.  She did admit to me that she had an episode of uterine bleeding about 1 month ago.  She Dr. Her primary care provider he did a CT scan.  The patient reports this was negative.  I do not have access to records from the primary care provider's office or reports of the CT scan.  Patient said that she had a GYN exam.  She denies rectal bleeding.    We also discussed her history of iron deficiency anemia.  I reviewed the labs that were done in January of this past year.  The patient was iron deficient at that time.  I explained to her that we should repeat the iron studies which she agrees to.  She does decline colonoscopy even if that is indicated.    Finally she tells me that she has had some shoulder pain.  She believes the pain is gotten a lot worse since her port was removed.  She points the area below her port as a site of no significant pain but says it is worse when she moves the shoulder around.  She tells me this could have been made worse while she was helping move her  around.  She would like to hold off on any imaging at this time.  She will likely address the issue with her primary care provider if the pain gets worse but will certainly contact me if there are any additional concerns.    Review of Systems    Constitutional: Positive for fatigue. Negative for appetite change, diaphoresis, fever, unexpected weight gain and unexpected weight loss.   HENT: Negative for hearing loss, mouth sores, sore throat, swollen glands, trouble swallowing and voice change.    Eyes: Negative for blurred vision.   Respiratory: Negative for cough, shortness of breath and wheezing.    Cardiovascular: Negative for chest pain and palpitations.   Gastrointestinal: Negative for abdominal pain, blood in stool, constipation, diarrhea, nausea and vomiting.   Endocrine: Negative for cold intolerance and heat intolerance.   Genitourinary: Negative for difficulty urinating, dysuria, frequency, hematuria and urinary incontinence.   Musculoskeletal: Positive for myalgias. Negative for arthralgias and back pain.   Skin: Negative for rash, skin lesions and bruise.   Neurological: Positive for headache. Negative for dizziness, seizures, weakness and numbness.   Hematological: Does not bruise/bleed easily.   Psychiatric/Behavioral: Negative for depressed mood. The patient is not nervous/anxious.        Past Medical History:   Diagnosis Date   • Breast cancer (CMS/HCC)    • Hypertension      Past Surgical History:   Procedure Laterality Date   • BREAST LUMPECTOMY     • CYST REMOVAL       Social History     Socioeconomic History   • Marital status:      Spouse name: Not on file   • Number of children: Not on file   • Years of education: Not on file   • Highest education level: Not on file   Tobacco Use   • Smoking status: Never Smoker   Substance and Sexual Activity   • Alcohol use: Never   • Drug use: Never   • Sexual activity: Defer     Family History   Problem Relation Age of Onset   • Breast cancer Mother    • Bone cancer Mother    • Bone cancer Father        Objective   Physical Exam  General: Alert, cooperative, no acute distress but tearful at times, chronically ill-appearing with some evidence of temporal wasting, prominent clavicle and  "other bones although she has notable lower extremity edema  Eyes: Anicteric sclera, PERRLA  Respiratory: CTAB, normal respiratory effort  Abdomen: Normal active bowel sounds, no tenderness, no distention  Cardiovascular: RRR, no murmur  Skin: Normal tone, no rash, no lesions  Psychiatric: Appropriate affect, intact judgment  Neurologic: No focal sensory or motor deficits, no weakness, numbness, dizziness  Musculoskeletal: Normal muscle strength and tone  Extremities: No clubbing, cyanosis, or deformities      Vitals:    08/19/21 1425   BP: 122/56  Comment: RT ARM   Pulse: 79   Resp: 18   Temp: 97.1 °F (36.2 °C)   TempSrc: Temporal   SpO2: 96%  Comment: RM AIR   Weight: 88.1 kg (194 lb 3.6 oz)   Height: 162.5 cm (63.98\")   PainSc: 0-No pain               PHQ-9 Total Score: 0       Result Review :   The following data was reviewed by: Jocelyn Camp MD PhD on 08/19/2021:  Lab Results   Component Value Date    HGB 10.9 (L) 08/19/2021    HCT 35.2 08/19/2021    MCV 93.1 08/19/2021    PLT 99 (L) 08/19/2021    WBC 3.65 08/19/2021    NEUTROABS 2.51 08/19/2021    LYMPHSABS 0.67 (L) 08/19/2021    MONOSABS 0.35 08/19/2021    EOSABS 0.10 08/19/2021    BASOSABS 0.02 08/19/2021     Lab Results   Component Value Date    GLUCOSE 86 08/19/2021    BUN 33 (H) 08/19/2021    CREATININE 1.04 (H) 08/19/2021     (L) 08/19/2021    K 4.1 08/19/2021    CL 97 (L) 08/19/2021    CO2 26.1 08/19/2021    CALCIUM 10.4 08/19/2021    PROTEINTOT 7.2 08/19/2021    ALBUMIN 4.10 08/19/2021    BILITOT 1.6 (H) 08/19/2021    ALKPHOS 225 (H) 08/19/2021    AST 27 08/19/2021    ALT 13 08/19/2021          Assessment and Plan    Diagnoses and all orders for this visit:    1. Malignant neoplasm of left breast in female, estrogen receptor positive, unspecified site of breast (CMS/HCC) (Primary)    2. Other iron deficiency anemia  -     CBC & Differential; Future  -     Comprehensive Metabolic Panel; Future  -     Ferritin; Future  -     Iron Profile; " Future  -     Vitamin B12; Future  -     Folate; Future      HR +/HER-2 + left breast cancer: Patient completed 4 cycles of neoadjuvant TCH     but stopped chemotherapy early due to intolerance.  After lumpectomy on   8/18/2020 she had a 1.5 cm residual tumor.  She was able to complete 3 to 4     months of anti-HER-2 therapy before stopping due to deconditioning and lower   extremity edema.  Patient started anastrozole in January 2021 and is tolerating it very well. Her mammograms are done in [place] by her primary care provider. The last was in June of this year and was negative.      Iron deficiency anemia: I reviewed the patient's iron studies from 1/19/2021.  At that time her transferrin saturation was 8% and her ferritin was 45.  Patient has taken oral iron in the past and did not tolerate it well.  She tells me if she has have iron again she would want IV iron but would much refer to try to improve her iron stores through dietary improvement.  She tells me that she also would not want to have a colonoscopy but we will address that again when I know her results.  We will check labs today including CBC, CMP, iron profile, ferritin, B12/folate.  I will call her with the results of these.  We will tentatively plan a follow-up in 6 months but modify that as needed.    Right shoulder pain: Likely musculoskeletal in nature due to heavy lifting she has to do to take care of her .  If the pain worsens I strongly encouraged the patient to have a CT scan and possibly bone scan which can be ordered by her primary care provider.  However if she contacts this clinic I am happy to order the scans and follow-up after.        Risk for osteoporosis: Patient reports her last DEXA scan was done at [place] by her PCP.  I encouraged her to have this repeated every 2 years. I will attempt to get the records.          CHF: Patient's ejection fraction is 35 - 45%.  Patient has follow-up with Dr. Vazquez in cardiology.   Lower extremity edema is stable.        I spent 53 minutes caring for Yareli on this date of service. This time includes time spent by me in the following activities: preparing for the visit, reviewing tests, obtaining and/or reviewing a separately obtained history, performing a medically appropriate examination and/or evaluation, counseling and educating the patient/family/caregiver, ordering medications, tests, or procedures and documenting information in the medical record     Patient was given instructions and counseling regarding her condition or for health maintenance advice. Please see specific information pulled into the AVS if appropriate.     Jocelyn Camp MD PhD    8/19/2021

## 2021-08-20 DIAGNOSIS — D50.8 OTHER IRON DEFICIENCY ANEMIA: Primary | ICD-10-CM

## 2021-08-20 LAB
FOLATE SERPL-MCNC: 14.2 NG/ML (ref 4.78–24.2)
VIT B12 BLD-MCNC: 551 PG/ML (ref 211–946)

## 2021-08-20 RX ORDER — DOXYCYCLINE HYCLATE 50 MG/1
324 CAPSULE, GELATIN COATED ORAL
Qty: 30 TABLET | Refills: 5 | Status: SHIPPED | OUTPATIENT
Start: 2021-08-20 | End: 2022-02-28

## 2021-08-20 NOTE — TELEPHONE ENCOUNTER
Called patient to discuss lab results. Iron level is very low. Dr. Camp recommends IV iron. Patient has difficulty coming here for infusions due to caring for disabled spouse. She would like to try oral iron first to see if she can tolerate. Suggested taking stool softeners to prevent constipation. Prescription sent for oral iron. Instructed patient to notify us of side effects and we can order IV iron.

## 2021-09-13 ENCOUNTER — APPOINTMENT (OUTPATIENT)
Dept: RADIATION ONCOLOGY | Facility: HOSPITAL | Age: 76
End: 2021-09-13

## 2021-09-13 ENCOUNTER — OFFICE VISIT (OUTPATIENT)
Dept: RADIATION ONCOLOGY | Facility: HOSPITAL | Age: 76
End: 2021-09-13

## 2021-09-13 VITALS
RESPIRATION RATE: 16 BRPM | SYSTOLIC BLOOD PRESSURE: 94 MMHG | OXYGEN SATURATION: 96 % | HEART RATE: 66 BPM | TEMPERATURE: 97.9 F | DIASTOLIC BLOOD PRESSURE: 49 MMHG | WEIGHT: 192.24 LBS | BODY MASS INDEX: 33.02 KG/M2

## 2021-09-13 DIAGNOSIS — Z17.0 MALIGNANT NEOPLASM OF UPPER-OUTER QUADRANT OF LEFT BREAST IN FEMALE, ESTROGEN RECEPTOR POSITIVE (HCC): Primary | ICD-10-CM

## 2021-09-13 DIAGNOSIS — C50.412 MALIGNANT NEOPLASM OF UPPER-OUTER QUADRANT OF LEFT BREAST IN FEMALE, ESTROGEN RECEPTOR POSITIVE (HCC): Primary | ICD-10-CM

## 2021-09-13 PROCEDURE — 99212 OFFICE O/P EST SF 10 MIN: CPT | Performed by: RADIOLOGY

## 2021-09-13 PROCEDURE — G0463 HOSPITAL OUTPT CLINIC VISIT: HCPCS

## 2021-09-13 NOTE — PROGRESS NOTES
FOLLOW UP NOTE    PATIENT:                                                      Yareli Phillips  MEDICAL RECORD #:                        [id number]  :                                                          1945     DIAGNOSIS:  ypT1c ypN0(sn) M0 invasive ductal carcinoma, grade 2, ER positive/AL positive, HER-2/mely positive        BRIEF HISTORY:  Yareli Phillips returns to radiation oncology for routine scheduled follow-up.  She reports feeling well overall with the exception of diffuse arthralgias while taking anastrozole.  She additionally reports some left breast tenderness that has been persistent since completing external beam radiotherapy.  She has no other complaints.  Mammography performed on 2021 was BI-RADS 2.    MEDICATIONS: Medication reconciliation for the patient was reviewed and confirmed in the electronic medical record.    Review of Systems   Constitutional: Positive for fatigue. Negative for appetite change.   HENT:   Negative for trouble swallowing.    Respiratory: Positive for shortness of breath (occasional). Negative for cough.    Cardiovascular: Positive for leg swelling.   Gastrointestinal: Positive for constipation (consumes fruit, metamucil to avoid while taking iron supplement). Negative for diarrhea.   Genitourinary: Positive for frequency. Negative for difficulty urinating.    Musculoskeletal: Positive for arthralgias, gait problem and myalgias (joint pain developed after starting Anastrazole).   Neurological: Positive for dizziness (rarely), gait problem and headaches.   Psychiatric/Behavioral: Negative for sleep disturbance.       KPS 90:  Minor signs or symptoms  ECOG (1) Restricted in physically strenuous activity, ambulatory and able to do work of light nature    Physical Exam  Constitutional:       Appearance: Normal appearance.   HENT:      Head: Normocephalic and atraumatic.   Pulmonary:      Effort: Pulmonary effort is normal. No respiratory distress.   Chest:       Comments: Mild to moderate left breast fibrosis and dependent edema  Abdominal:      General: Abdomen is flat. There is no distension.      Palpations: Abdomen is soft.   Neurological:      General: No focal deficit present.      Mental Status: She is alert and oriented to person, place, and time.      Cranial Nerves: No cranial nerve deficit.   Psychiatric:         Mood and Affect: Mood normal.         Behavior: Behavior normal.         VITAL SIGNS:   Vitals:    09/13/21 0952   BP: 94/49   Pulse: 66   Resp: 16   Temp: 97.9 °F (36.6 °C)   TempSrc: Temporal   SpO2: 96%   Weight: 87.2 kg (192 lb 3.9 oz)   PainSc: 0-No pain                No primary diagnosis found.    IMPRESSION:  Yareli Phillips is a 76-year-old female with ypT1c ypN0(sn) M0 invasive ductal carcinoma, grade 2, ER positive/ID positive, HER-2/mely positive status post neoadjuvant chemotherapy followed by lumpectomy and sentinel lymph node biopsy.  She completed external beam radiotherapy in October 2020, receiving 50 Gray in 25 daily fractions of 2 Schuster.  She is doing well overall with no clinical or radiographic evidence of disease.  ECOG 1      RECOMMENDATIONS:  Yareli Phillips will continue to follow-up with Dr. Camp as scheduled she was encouraged to contact me with any questions or concerns regarding her care.    QUALITY OF LIFE: 80 - Restricted Physical Activity           No follow-ups on file.     Elias Mcconnell MD

## 2021-09-17 ENCOUNTER — OFFICE VISIT (OUTPATIENT)
Dept: CARDIOLOGY | Facility: CLINIC | Age: 76
End: 2021-09-17

## 2021-09-17 VITALS
HEART RATE: 74 BPM | BODY MASS INDEX: 31.65 KG/M2 | HEIGHT: 65 IN | WEIGHT: 190 LBS | DIASTOLIC BLOOD PRESSURE: 64 MMHG | SYSTOLIC BLOOD PRESSURE: 116 MMHG

## 2021-09-17 DIAGNOSIS — I50.42 CHRONIC COMBINED SYSTOLIC AND DIASTOLIC CONGESTIVE HEART FAILURE (HCC): Primary | ICD-10-CM

## 2021-09-17 DIAGNOSIS — Z13.220 SCREENING CHOLESTEROL LEVEL: ICD-10-CM

## 2021-09-17 DIAGNOSIS — I10 ESSENTIAL HYPERTENSION: ICD-10-CM

## 2021-09-17 DIAGNOSIS — I48.20 CHRONIC ATRIAL FIBRILLATION (HCC): ICD-10-CM

## 2021-09-17 PROBLEM — Z95.0 PRESENCE OF CARDIAC PACEMAKER: Chronic | Status: ACTIVE | Noted: 2020-02-07

## 2021-09-17 PROCEDURE — 99214 OFFICE O/P EST MOD 30 MIN: CPT | Performed by: NURSE PRACTITIONER

## 2021-09-17 RX ORDER — CARVEDILOL 25 MG/1
25 TABLET ORAL EVERY 12 HOURS SCHEDULED
Qty: 180 TABLET | Refills: 3 | Status: SHIPPED | OUTPATIENT
Start: 2021-09-17 | End: 2022-02-23 | Stop reason: SDUPTHER

## 2021-09-17 RX ORDER — SPIRONOLACTONE 25 MG/1
25 TABLET ORAL EVERY 12 HOURS SCHEDULED
Qty: 180 TABLET | Refills: 3 | Status: SHIPPED | OUTPATIENT
Start: 2021-09-17 | End: 2022-02-23 | Stop reason: SDUPTHER

## 2021-09-17 NOTE — PROGRESS NOTES
Chief Complaint  Follow-up, Atrial Fibrillation, Congestive Heart Failure, and Hypertensice disorder    Subjective            Yareli Phillips presents to [facility] CARDIOLOGY  She is a 74-year-old white female comes in complaining of increasing fatigue.  Has palpitation scribes her heart pounding.  Has not had a few chest discomfort described as pressure only lasted few minutes that is unchanged.  Complains of increasing shortness of breath when she overexerts herself.  She takes her Lasix 40 mg every day and about once a week she will take an extra 1 as needed.  Is noted that she is been low on iron was recommended to have iron infusions but she declined.  So she started on iron tablets 2 weeks ago her symptoms are starting to feel a little bit better.  Denies any dizziness, syncope, PND, orthopnea.  Has had both Covid vaccines.            Past History:    Past Medical History:   Diagnosis Date   • Atrial fibrillation (CMS/HCC)    • Breast cancer (CMS/HCC)    • CHF (congestive heart failure) (CMS/HCC)    • Hypertension    • Presence of cardiac pacemaker 2/7/2020        Family History: family history includes Bone cancer in her father and mother; Breast cancer in her mother.     Social History: reports that she has never smoked. She has never used smokeless tobacco. She reports that she does not drink alcohol and does not use drugs.    Allergies: Morphine and Meperidine      Past Surgical History:   Procedure Laterality Date   • BREAST LUMPECTOMY     • CYST REMOVAL     • PORTACATH PLACEMENT          Prior to Admission medications    Medication Sig Start Date End Date Taking? Authorizing Provider   acetaminophen (TYLENOL) 500 MG tablet Take 500 mg by mouth Every 6 (Six) Hours As Needed for Mild Pain .   Yes ProviderYin MD   anastrozole (ARIMIDEX) 1 MG tablet anastrozole 1 mg tablet   Take 1 tablet every day by oral route.   Yes Provider, MD Yin   apixaban (Eliquis) 5 MG tablet tablet  "Take 5 mg by mouth Every 12 (Twelve) Hours.   Yes Yin Swanson MD   carvedilol (COREG) 25 MG tablet Every 12 (Twelve) Hours.   Yes Yin Swanson MD   ferrous gluconate (FERGON) 324 MG tablet Take 1 tablet by mouth Daily With Breakfast. 8/20/21  Yes Jocelyn Camp MD PhD   furosemide (LASIX) 40 MG tablet Every 12 (Twelve) Hours.   Yes Yin Swanson MD   gabapentin (NEURONTIN) 300 MG capsule gabapentin 300 mg capsule   TAKE 1 CAPSULE BY MOUTH TWICE DAILY   Yes Yin Swanson MD   losartan (COZAAR) 25 MG tablet losartan 25 mg tablet   Take 0.5 tablets every day by oral route at bedtime.   Yes Yin Swanson MD   spironolactone (ALDACTONE) 25 MG tablet Every 12 (Twelve) Hours.   Yes Yin Swanson MD   traMADol (ULTRAM) 50 MG tablet Take 50 mg by mouth 2 (two) times a day.   Yes Yin Swanson MD        Review of Systems   Constitutional: Positive for fatigue.   Respiratory: Positive for shortness of breath.    Cardiovascular: Positive for chest pain (Tightness with over exertion), palpitations and leg swelling.   All other systems reviewed and are negative.       Objective     Physical Exam  Constitutional:       General: She is not in acute distress.     Appearance: She is obese.   Neck:      Vascular: No carotid bruit.   Cardiovascular:      Rate and Rhythm: Normal rate. Rhythm irregular.      Heart sounds: Murmur (1/6 at the apex) heard.     Pulmonary:      Effort: Pulmonary effort is normal.      Breath sounds: Normal breath sounds.   Abdominal:      Palpations: Abdomen is soft.   Musculoskeletal:      Right lower leg: No edema.      Left lower leg: No edema.   Neurological:      Mental Status: She is alert.       /64   Pulse 74   Ht 165.1 cm (65\")   Wt 86.2 kg (190 lb)   BMI 31.62 kg/m²       Vitals:    09/17/21 1047   BP: 116/64   Pulse: 74       Result Review :         The following data was reviewed by: KISHAN Varela on " 09/17/2021:      Lab Results   Component Value Date    BNP 1,110 (H) 09/08/2021     06/12/2020     CMP    CMP 12/29/20 1/19/21 8/19/21   Glucose   86   Glucose 111 (A) 108 (A)    BUN 19 24 33 (A)   Creatinine 0.87 0.78 1.04 (A)   eGFR Non African Am   52 (A)   Sodium 134 (A) 132 (A) 134 (A)   Potassium 3.3 (A) 3.9 4.1   Chloride 100 100 97 (A)   Calcium 9.7 10.3 10.4   Albumin 3.2 (A) 3.3 (A) 4.10   Total Bilirubin 1.49 (A) 1.56 (A) 1.6 (A)   Alkaline Phosphatase 160 180 (A) 225 (A)   AST (SGOT) 18 21 27   ALT (SGPT) 7 (A) 7 (A) 13   (A) Abnormal value       Comments are available for some flowsheets but are not being displayed.           CBC w/diff    CBC w/Diff 12/29/20 12/29/20 1/19/21 1/19/21 8/19/21    1342 1342 1025 1025    WBC 2.43 (A)  2.02 (A)  3.65   RBC     3.78   Hemoglobin 9.2 (A)  8.9 (A)  10.9 (A)   Hematocrit 29.4 (A)  28.8 (A)  35.2   .0 (A)  101.4 (A)  93.1   MCH 31.6 (A)  31.3 (A)  28.8   MCHC 31.3 (A)  30.9 (A)  31.0 (A)   RDW 69.1 18.5 (A) 67.0 18.1 (A) 16.8 (A)   Platelets     99 (A)   Neutrophil Rel %     68.8   Immature Granulocyte Rel %     0.0   Lymphocyte Rel % 17.7 (A)  21.8  18.4 (A)   Monocyte Rel %     9.6   Eosinophil Rel %     2.7   Basophil Rel % 0.8  1.0  0.5   (A) Abnormal value                Lab Results   Component Value Date    TSH 3.200 11/17/2020      Lab Results   Component Value Date    FREET4 1.1 11/17/2020      No results found for: DDIMERQUANT  No results found for: MG   No results found for: DIGOXIN                          Assessment and Plan        Diagnoses and all orders for this visit:    1. Chronic combined systolic and diastolic congestive heart failure (CMS/HCC) (Primary)  Assessment & Plan:  I feel her increased shortness of breath and palpitations related to her low iron levels since starting to get better since she started the iron.  She will call me in about a month if does not continue to improve and we will evaluate accordingly.  Continue  spironolactone 25 twice daily, furosemide 40 mg once a day (can take extra one as needed) and carvedilol 25 mg twice daily    Orders:  -     spironolactone (ALDACTONE) 25 MG tablet; Take 1 tablet by mouth Every 12 (Twelve) Hours.  Dispense: 180 tablet; Refill: 3  -     proBNP; Future  -     Comprehensive Metabolic Panel; Future    2. Chronic atrial fibrillation (CMS/HCC)  Assessment & Plan:  Rate controlled.  Continue carvedilol 25 twice daily and Eliquis 5 twice daily.    Orders:  -     carvedilol (COREG) 25 MG tablet; Take 1 tablet by mouth Every 12 (Twelve) Hours.  Dispense: 180 tablet; Refill: 3  -     CBC Auto Differential; Future  -     CBC & Differential; Future    3. Essential hypertension  Assessment & Plan:  Controlled.  Continue spironolactone, losartan, furosemide, and carvedilol.    Orders:  -     Basic Metabolic Panel; Future  -     Comprehensive Metabolic Panel; Future  -     Magnesium; Future    4. Screening cholesterol level  -     Lipid Panel; Future            Follow Up     Return in about 6 months (around 3/17/2022) for with Dr. Vazquez.    Patient was given instructions and counseling regarding her condition or for health maintenance advice. Please see specific information pulled into the AVS if appropriate.       KISHAN Vance  09/17/21 10:55 EDT

## 2021-09-17 NOTE — ASSESSMENT & PLAN NOTE
I feel her increased shortness of breath and palpitations related to her low iron levels since starting to get better since she started the iron.  She will call me in about a month if does not continue to improve and we will evaluate accordingly.  Continue spironolactone 25 twice daily, furosemide 40 mg once a day (can take extra one as needed) and carvedilol 25 mg twice daily

## 2021-09-29 ENCOUNTER — TELEPHONE (OUTPATIENT)
Dept: ONCOLOGY | Facility: HOSPITAL | Age: 76
End: 2021-09-29

## 2021-10-18 ENCOUNTER — TELEPHONE (OUTPATIENT)
Dept: ONCOLOGY | Facility: HOSPITAL | Age: 76
End: 2021-10-18

## 2021-10-18 NOTE — TELEPHONE ENCOUNTER
Caller: Yareli Phillips    Relationship: Self      Medication requested (name and dosage): gabapentin (NEURONTIN) 300 MG capsule    Pharmacy where request should be sent:     Additional details provided by patient: PATIENT HAS 2 LEFT. PHARMACY TOLD HER THAT THEY HAVE HAD NO REPLY FROM OFFICE. SHE WOULD LIKE A FOLLOW UP CALL WHEN SENT .   THANK YOU     Best call back number: [phone]    Does the patient have less than a 3 day supply:  [x] Yes  [] No    STACY Shah   10/18/21 15:25 EDT

## 2021-10-19 RX ORDER — GABAPENTIN 300 MG/1
300 CAPSULE ORAL 2 TIMES DAILY
Qty: 180 CAPSULE | Refills: 2 | Status: SHIPPED | OUTPATIENT
Start: 2021-10-19 | End: 2023-03-27

## 2021-10-20 DIAGNOSIS — G62.9 NEUROPATHY: Primary | ICD-10-CM

## 2021-10-20 DIAGNOSIS — I50.42 CHRONIC COMBINED SYSTOLIC AND DIASTOLIC CONGESTIVE HEART FAILURE (HCC): ICD-10-CM

## 2021-10-20 RX ORDER — GABAPENTIN 300 MG/1
300 CAPSULE ORAL 2 TIMES DAILY
Qty: 180 CAPSULE | Refills: 2 | Status: CANCELLED | OUTPATIENT
Start: 2021-10-20 | End: 2022-01-18

## 2021-10-22 ENCOUNTER — TELEPHONE (OUTPATIENT)
Dept: CARDIOLOGY | Facility: CLINIC | Age: 76
End: 2021-10-22

## 2021-10-25 ENCOUNTER — DOCUMENTATION (OUTPATIENT)
Dept: CARDIOLOGY | Facility: CLINIC | Age: 76
End: 2021-10-25

## 2021-11-29 RX ORDER — APIXABAN 5 MG/1
TABLET, FILM COATED ORAL
Qty: 180 TABLET | Refills: 3 | Status: SHIPPED | OUTPATIENT
Start: 2021-11-29 | End: 2022-09-19 | Stop reason: SDUPTHER

## 2022-02-16 DIAGNOSIS — I48.20 CHRONIC ATRIAL FIBRILLATION: ICD-10-CM

## 2022-02-17 ENCOUNTER — OFFICE VISIT (OUTPATIENT)
Dept: ONCOLOGY | Facility: HOSPITAL | Age: 77
End: 2022-02-17

## 2022-02-17 VITALS
WEIGHT: 200.62 LBS | RESPIRATION RATE: 16 BRPM | HEART RATE: 70 BPM | BODY MASS INDEX: 33.38 KG/M2 | OXYGEN SATURATION: 97 % | SYSTOLIC BLOOD PRESSURE: 102 MMHG | DIASTOLIC BLOOD PRESSURE: 67 MMHG | TEMPERATURE: 97.7 F

## 2022-02-17 DIAGNOSIS — C50.919 MALIGNANT NEOPLASM OF FEMALE BREAST, UNSPECIFIED ESTROGEN RECEPTOR STATUS, UNSPECIFIED LATERALITY, UNSPECIFIED SITE OF BREAST: ICD-10-CM

## 2022-02-17 DIAGNOSIS — D50.8 OTHER IRON DEFICIENCY ANEMIA: ICD-10-CM

## 2022-02-17 DIAGNOSIS — Z78.0 POST-MENOPAUSAL: Primary | ICD-10-CM

## 2022-02-17 PROBLEM — M19.90 ARTHRITIS: Status: ACTIVE | Noted: 2021-11-05

## 2022-02-17 PROCEDURE — 99214 OFFICE O/P EST MOD 30 MIN: CPT | Performed by: INTERNAL MEDICINE

## 2022-02-17 PROCEDURE — G0463 HOSPITAL OUTPT CLINIC VISIT: HCPCS | Performed by: INTERNAL MEDICINE

## 2022-02-17 RX ORDER — ANASTROZOLE 1 MG/1
1 TABLET ORAL DAILY
Qty: 90 TABLET | Refills: 1 | Status: SHIPPED | OUTPATIENT
Start: 2022-02-17 | End: 2022-06-02

## 2022-02-17 RX ORDER — MELOXICAM 7.5 MG/1
TABLET ORAL
COMMUNITY
End: 2022-09-19 | Stop reason: ALTCHOICE

## 2022-02-17 RX ORDER — LORATADINE 10 MG/1
TABLET ORAL
COMMUNITY
End: 2022-02-23 | Stop reason: ALTCHOICE

## 2022-02-17 RX ORDER — PREDNISONE 1 MG/1
TABLET ORAL
COMMUNITY
End: 2022-02-23 | Stop reason: ALTCHOICE

## 2022-02-17 RX ORDER — AZITHROMYCIN 250 MG/1
TABLET, FILM COATED ORAL
COMMUNITY
End: 2022-02-23 | Stop reason: ALTCHOICE

## 2022-02-17 NOTE — PROGRESS NOTES
Patient  Yareli Phillips    [facility] HEMATOLOGY & ONCOLOGY    Chief Complaint  Breast Cancer    Referring Provider: KISHAN Hyatt  PCP: Evangelina Bajwa APRN    Subjective          Oncology/Hematology History Overview Note   HER2+ Breast Cancer:            The breast mass was initially noted on screening mammogram.  Diagnostic     mammogram and ultrasound confirmed a 2.7 x 2.5 cm mass at the 3 o'clock     position.  She underwent biopsy on 4/6/2020.  Pathology was positive for     invasive ductal carcinoma, estrogen receptor positive (75%, moderate),     progesterone receptor positive (40%, moderate) HER-2 equivocal (2+ by IHC), HER-    2 FISH positive, Ki-67 55%.            ECHO Global Hypokinesis 45-50%            Cycle 1 of neoadjuvant TCH on 4/30/2020 -AUC of 4      Cycle 2 of TCH on 5/22/20 (60mg/kg Taxol)       Cycle 3 on 6/12/2020      Cycle 4 on 7/2/2020 - further chemotherapy was halted due to patient     intolerance.            Left breast lumpectomy 8/18/2020: Patient had a residual 15 mm tumor, grade 2     with no associated DCIS.  Lymph vascular invasion was present.  Margins are     negative.  4 lymph nodes were examined and all 4 were found to be neck.  Final     stage [id number].            Since the patient had residual disease on lumpectomy her treatment was switched     to Kadcyla which she will give her 1 year.  First cycle on 10/20/2020.  Decrease    the dose of Kadcyla after her first cycle due to nausea, fatigue, and decreased     appetite.  Cycle 2 on 11/10/2020.            After 2 cycles of Kadcyla patient was transitioned back 2 Herceptin due to     cytopenias. C7 with Herceptin on 12/1/20. Transitioned back to Kadcyla at a     reduced dose after developing LE edema of Herceptin but developed further     swelling and stopped anticancer therapy entirely.            Started anastrozole in January 2021         Malignant tumor of breast (HCC)   5/8/2020  "Initial Diagnosis    Malignant tumor of breast (CMS/HCC)         The patient presents today for follow-up of her breast cancer. She reports that she had Covid approximately 3 weeks ago. She states that she received both of her vaccines, so it was not too severe, and she primarily had a sore throat and sinus symptoms.     She states that she has gained back a good amount of the weight she lost during chemotherapy. She relates that the anastrozole \"eats her toenails up.\"  She feels like it is working fairly well, however. She has occasional headaches at night but states that, overall she is doing well.      The patient states that she had been having a great deal of trouble with her shoulders. She relates that they were so painful she was unable to put her tops on or take them off. She spoke to Evangelina, who placed her on 7.5 mg of meloxicam, which she states helped tremendously. The patient also complains of pain in the area where her port was located. She states that it feels like muscle pain and hurts when she moves. She reports having diffuse body pain, which she attributes to arthritis. She confirms that she has seen an arthritis specialist, Dr. Rocío Vazquez.     She relates that her energy level is fairly good in general. She states that she was placed on a steroid taper for the Covid, which gave her a great deal of energy. She reports that the steroids also helped significantly with her body pain.     The patient reports that she had blood work done yesterday, as ordered by Dr. Amos Vazquez's office. Her next appointment with him is next Wednesday, 02/23/2022.      She states she has not had a bone density test recently but can not recall whether it has been more than 2 years since her last test.     The patient is currently taking iron supplements. She denies any gastrointestinal upset, as she takes this with food.         Review of Systems   Constitutional: Positive for fatigue. Negative for appetite change, " diaphoresis, fever, unexpected weight gain and unexpected weight loss.   HENT: Negative for hearing loss, sore throat and voice change.    Eyes: Negative for blurred vision, double vision, pain, redness and visual disturbance.   Respiratory: Negative for cough, shortness of breath and wheezing.    Cardiovascular: Negative for chest pain, palpitations and leg swelling.   Endocrine: Negative for cold intolerance, heat intolerance, polydipsia and polyuria.   Genitourinary: Negative for decreased urine volume, difficulty urinating, frequency and urinary incontinence.   Musculoskeletal: Positive for joint swelling (Right shoulder pain ) and neck pain. Negative for arthralgias, back pain and myalgias.   Skin: Positive for dry skin. Negative for color change, rash, skin lesions and wound.   Neurological: Negative for dizziness, seizures, numbness and headache.   Hematological: Negative for adenopathy. Does not bruise/bleed easily.   Psychiatric/Behavioral: Negative for depressed mood. The patient is not nervous/anxious.    All other systems reviewed and are negative.      Past Medical History:   Diagnosis Date   • Atrial fibrillation (HCC)    • Breast cancer (HCC)    • CHF (congestive heart failure) (HCC)    • Hypertension    • Presence of cardiac pacemaker 2/7/2020     Past Surgical History:   Procedure Laterality Date   • BREAST LUMPECTOMY     • CYST REMOVAL     • PORTACATH PLACEMENT       Social History     Socioeconomic History   • Marital status:    Tobacco Use   • Smoking status: Never Smoker   • Smokeless tobacco: Never Used   Vaping Use   • Vaping Use: Never used   Substance and Sexual Activity   • Alcohol use: Never   • Drug use: Never   • Sexual activity: Defer     Family History   Problem Relation Age of Onset   • Breast cancer Mother    • Bone cancer Mother    • Bone cancer Father        Objective   Physical Exam   General: Alert, cooperative, no acute distress  Eyes: Anicteric sclera,  PERRLA  Respiratory: normal respiratory effort  Cardiovascular: no lower extremity edema  Skin: Normal tone, no rash, no lesions  Psychiatric: Appropriate affect, intact judgment  Neurologic: No focal sensory or motor deficits, normal cognition   Musculoskeletal: Normal muscle strength and tone  Extremities: No clubbing, cyanosis, or deformities    Vitals:    02/17/22 1250   BP: 102/67   Pulse: 70   Resp: 16   Temp: 97.7 °F (36.5 °C)   SpO2: 97%   Weight: 91 kg (200 lb 9.9 oz)   PainSc: 0-No pain               PHQ-9 Total Score: 0       Result Review :   The following data was reviewed by: Brielle Summers MA on 02/17/2022:  Lab Results   Component Value Date    HGB 10.9 (L) 08/19/2021    HCT 35.2 08/19/2021    MCV 93.1 08/19/2021    PLT 99 (L) 08/19/2021    WBC 3.65 08/19/2021    NEUTROABS 2.51 08/19/2021    LYMPHSABS 0.67 (L) 08/19/2021    MONOSABS 0.35 08/19/2021    EOSABS 0.10 08/19/2021    BASOSABS 0.02 08/19/2021     Lab Results   Component Value Date    GLUCOSE 86 08/19/2021    BUN 33 (H) 08/19/2021    CREATININE 1.04 (H) 08/19/2021     (L) 08/19/2021    K 4.1 08/19/2021    CL 97 (L) 08/19/2021    CO2 26.1 08/19/2021    CALCIUM 10.4 08/19/2021    PROTEINTOT 7.2 08/19/2021    ALBUMIN 4.10 08/19/2021    BILITOT 1.6 (H) 08/19/2021    ALKPHOS 225 (H) 08/19/2021    AST 27 08/19/2021    ALT 13 08/19/2021          Assessment and Plan    Diagnoses and all orders for this visit:    1. Post-menopausal (Primary)  -     DEXA Bone Density Axial; Future    2. Malignant neoplasm of female breast, unspecified estrogen receptor status, unspecified laterality, unspecified site of breast (HCC)    3. Other iron deficiency anemia  -     CBC & Differential; Future  -     Iron Profile; Future  -     Ferritin; Future    Other orders  -     anastrozole (ARIMIDEX) 1 MG tablet; Take 1 tablet by mouth Daily.  Dispense: 90 tablet; Refill: 1    HR +/HER-2 + left breast cancer: Patient started anastrozole in January 2021 and is        tolerating it very well.  I will refill the medication with a 90-day supply.    Risk for osteoporosis: Patient reports her last DEXA scan was done at       [facility] by her PCP. We will order a DEXA scan and have her follow-up in 2 months to discuss the results.     Iron deficiency anemia: She will continue on the iron supplementation. We will repeat the iron panel when she returns in 2 months.     Low platelet count: We will repeat a blood count when she returns in 2 months.     Patient was given instructions and counseling regarding her condition or for health maintenance advice. Please see specific information pulled into the AVS if appropriate.     Brielle Summers MA    2/17/2022     Transcribed from ambient dictation for Jocelyn Camp MD by Jaida Kiser.  02/17/2022   9:22 pm

## 2022-02-23 ENCOUNTER — OFFICE VISIT (OUTPATIENT)
Dept: CARDIOLOGY | Facility: CLINIC | Age: 77
End: 2022-02-23

## 2022-02-23 VITALS
HEART RATE: 70 BPM | BODY MASS INDEX: 33.82 KG/M2 | SYSTOLIC BLOOD PRESSURE: 95 MMHG | WEIGHT: 203 LBS | DIASTOLIC BLOOD PRESSURE: 54 MMHG | HEIGHT: 65 IN

## 2022-02-23 DIAGNOSIS — N18.31 STAGE 3A CHRONIC KIDNEY DISEASE: ICD-10-CM

## 2022-02-23 DIAGNOSIS — I48.20 CHRONIC ATRIAL FIBRILLATION: Primary | ICD-10-CM

## 2022-02-23 DIAGNOSIS — I10 PRIMARY HYPERTENSION: ICD-10-CM

## 2022-02-23 DIAGNOSIS — I50.42 CHRONIC COMBINED SYSTOLIC AND DIASTOLIC CONGESTIVE HEART FAILURE: ICD-10-CM

## 2022-02-23 PROCEDURE — 99214 OFFICE O/P EST MOD 30 MIN: CPT | Performed by: NURSE PRACTITIONER

## 2022-02-23 RX ORDER — SPIRONOLACTONE 25 MG/1
25 TABLET ORAL EVERY 12 HOURS SCHEDULED
Qty: 180 TABLET | Refills: 3 | Status: SHIPPED | OUTPATIENT
Start: 2022-02-23 | End: 2023-03-08 | Stop reason: SDUPTHER

## 2022-02-23 RX ORDER — CARVEDILOL 25 MG/1
25 TABLET ORAL EVERY 12 HOURS SCHEDULED
Qty: 180 TABLET | Refills: 3 | Status: SHIPPED | OUTPATIENT
Start: 2022-02-23 | End: 2023-02-27 | Stop reason: SDUPTHER

## 2022-02-23 NOTE — ASSESSMENT & PLAN NOTE
Caution regarding taking meloxicam long-term with her kidney disease.  She agrees to cut back and not take it regularly.

## 2022-02-23 NOTE — PROGRESS NOTES
Chief Complaint  Atrial Fibrillation, Congestive Heart Failure, and Hypertension    Subjective        Yareli Phillips presents to [facility] CARDIOLOGY  She is a 76-year-old white female comes in evaluate her atrial arrhythmias.  Has occasional pedal edema but Chi its only when she eats salty foods. Denies any chest pains, shortness of breath, palpitations, dizziness, syncope, swelling, PND, or orthopnea.  Cardiac wise she has no complaints.  She had Covid infection in January.  But she states she is completely recovered now.  She had 2 Covid vaccines and intends to get the booster.  She was started on Mobic for her arthritis.  She states there is days where she could not do anything at all because her arthritis pain was so uncomfortable.        Past History:    Past Medical History:   Diagnosis Date   • Atrial fibrillation (HCC)    • Breast cancer (HCC)    • CHF (congestive heart failure) (HCC)    • Hypertension    • Presence of cardiac pacemaker 2/7/2020        Family History: family history includes Bone cancer in her father and mother; Breast cancer in her mother.     Social History: reports that she has never smoked. She has never used smokeless tobacco. She reports that she does not drink alcohol and does not use drugs.    Allergies: Morphine and Meperidine    Past Surgical History:   Procedure Laterality Date   • BREAST LUMPECTOMY     • CYST REMOVAL     • PORTACATH PLACEMENT            Current Outpatient Medications:   •  anastrozole (ARIMIDEX) 1 MG tablet, Take 1 tablet by mouth Daily., Disp: 90 tablet, Rfl: 1  •  carvedilol (COREG) 25 MG tablet, Take 1 tablet by mouth Every 12 (Twelve) Hours., Disp: 180 tablet, Rfl: 3  •  Eliquis 5 MG tablet tablet, TAKE ONE TABLET BY MOUTH TWICE DAILY, Disp: 180 tablet, Rfl: 3  •  ferrous gluconate (FERGON) 324 MG tablet, Take 1 tablet by mouth Daily With Breakfast., Disp: 30 tablet, Rfl: 5  •  furosemide (LASIX) 40 MG tablet, Take 40 mg by mouth Daily.,  Disp: , Rfl:   •  gabapentin (NEURONTIN) 300 MG capsule, Take 1 capsule by mouth 2 (two) times a day for 90 days., Disp: 180 capsule, Rfl: 2  •  losartan (COZAAR) 25 MG tablet, losartan 25 mg tablet  Take 0.5 tablets every day by oral route at bedtime., Disp: , Rfl:   •  meloxicam (MOBIC) 7.5 MG tablet, meloxicam 7.5 mg tablet  TAKE 1 TABLET BY MOUTH EVERY DAY, Disp: , Rfl:   •  silver sulfadiazine (SILVADENE, SSD) 1 % cream, silver sulfadiazine 1 % topical cream  APPLY TO AFFECTED AREA BID, Disp: , Rfl:   •  spironolactone (ALDACTONE) 25 MG tablet, Take 1 tablet by mouth Every 12 (Twelve) Hours., Disp: 180 tablet, Rfl: 3  •  traMADol (ULTRAM) 50 MG tablet, Take 50 mg by mouth 2 (two) times a day., Disp: , Rfl:   •  acetaminophen (TYLENOL) 500 MG tablet, Take 500 mg by mouth Every 6 (Six) Hours As Needed for Mild Pain ., Disp: , Rfl:      Medications Discontinued During This Encounter   Medication Reason   • predniSONE (DELTASONE) 5 MG tablet Discontinued by another clinician   • loratadine (Claritin) 10 MG tablet Discontinued by another clinician   • azithromycin (ZITHROMAX) 250 MG tablet Discontinued by another clinician   • Dextromethorphan-guaiFENesin 5-100 MG/5ML liquid Discontinued by another clinician   • spironolactone (ALDACTONE) 25 MG tablet Reorder   • carvedilol (COREG) 25 MG tablet Reorder        Review of Systems   Constitutional: Negative for fatigue.   Respiratory: Negative for cough and shortness of breath.    Cardiovascular: Positive for leg swelling. Negative for chest pain and palpitations.   Neurological: Negative for dizziness.   All other systems reviewed and are negative.       Objective     Physical Exam  Constitutional:       General: She is not in acute distress.     Appearance: She is obese.   Neck:      Vascular: No carotid bruit.   Cardiovascular:      Rate and Rhythm: Normal rate. Rhythm irregular.      Heart sounds: Murmur (1/6 systolic murmur the apex) heard.       Pulmonary:       "Effort: Pulmonary effort is normal.      Breath sounds: Normal breath sounds.   Musculoskeletal:      Right lower leg: No edema.      Left lower leg: No edema.   Neurological:      Mental Status: She is alert.       BP 95/54   Pulse 70   Ht 165.1 cm (65\")   Wt 92.1 kg (203 lb)   BMI 33.78 kg/m²       Vitals:    02/23/22 1438   BP: 95/54   Pulse: 70       Result Review :         The following data was reviewed by: KISHAN Varela on 02/23/2022:      Lab Results   Component Value Date    BNP 1,110 (H) 09/08/2021     06/12/2020     CMP    CMP 8/19/21   Glucose 86   BUN 33 (A)   Creatinine 1.04 (A)   eGFR Non African Am 52 (A)   Sodium 134 (A)   Potassium 4.1   Chloride 97 (A)   Calcium 10.4   Albumin 4.10   Total Bilirubin 1.6 (A)   Alkaline Phosphatase 225 (A)   AST (SGOT) 27   ALT (SGPT) 13   (A) Abnormal value            CBC w/diff    CBC w/Diff 8/19/21   WBC 3.65   RBC 3.78   Hemoglobin 10.9 (A)   Hematocrit 35.2   MCV 93.1   MCH 28.8   MCHC 31.0 (A)   RDW 16.8 (A)   Platelets 99 (A)   Neutrophil Rel % 68.8   Immature Granulocyte Rel % 0.0   Lymphocyte Rel % 18.4 (A)   Monocyte Rel % 9.6   Eosinophil Rel % 2.7   Basophil Rel % 0.5   (A) Abnormal value                Lab Results   Component Value Date    TSH 3.200 11/17/2020      Lab Results   Component Value Date    FREET4 1.1 11/17/2020      Labs on February 16, 2022 hemoglobin is 13.8, hematocrit 45.0, total cholesterol 152, triglycerides 64, HDL 62, LDL at 77, without any cholesterol meds, BUN is 40, creatinine is 1.3, AST is slightly elevated at 40              Assessment and Plan    Diagnoses and all orders for this visit:    1. Chronic atrial fibrillation (HCC) (Primary)  Assessment & Plan:  Rate controlled.  Continue Eliquis 5 mg twice daily.  Caution regarding taking Mobic with Eliquis.  Continue carvedilol 25 mg twice daily    Orders:  -     carvedilol (COREG) 25 MG tablet; Take 1 tablet by mouth Every 12 (Twelve) Hours.  Dispense: 180 " tablet; Refill: 3  -     CBC & Differential; Future    2. Chronic combined systolic and diastolic congestive heart failure (HCC)  Assessment & Plan:  Shortness of breath improving.  [place] class II.  Continue furosemide 40 mg, spironolactone 25 mg twice daily, losartan 25 mg half a tab and carvedilol 25 twice daily.    Orders:  -     spironolactone (ALDACTONE) 25 MG tablet; Take 1 tablet by mouth Every 12 (Twelve) Hours.  Dispense: 180 tablet; Refill: 3  -     Comprehensive Metabolic Panel; Future    3. Primary hypertension  Assessment & Plan:  Well-controlled.  Continue spironolactone, losartan, furosemide, and carvedilol.    Orders:  -     Magnesium; Future  -     CBC & Differential; Future    4. Stage 3a chronic kidney disease (HCC)  Assessment & Plan:  Caution regarding taking meloxicam long-term with her kidney disease.  She agrees to cut back and not take it regularly.    5.  Encouraged with Covid booster soon        Follow Up     Return in about 6 months (around 8/23/2022) for with Dr. Vazquez, EKG on next visit, With external labs.    Patient was given instructions and counseling regarding her condition or for health maintenance advice. Please see specific information pulled into the AVS if appropriate.       KISHAN Vance  02/23/22 14:43 EST

## 2022-02-23 NOTE — ASSESSMENT & PLAN NOTE
Shortness of breath improving.  [place] class II.  Continue furosemide 40 mg, spironolactone 25 mg twice daily, losartan 25 mg half a tab and carvedilol 25 twice daily.

## 2022-02-23 NOTE — ASSESSMENT & PLAN NOTE
Rate controlled.  Continue Eliquis 5 mg twice daily.  Caution regarding taking Mobic with Eliquis.  Continue carvedilol 25 mg twice daily

## 2022-02-27 DIAGNOSIS — D50.8 OTHER IRON DEFICIENCY ANEMIA: ICD-10-CM

## 2022-02-28 RX ORDER — DOXYCYCLINE HYCLATE 50 MG/1
324 CAPSULE, GELATIN COATED ORAL
Qty: 30 TABLET | Refills: 5 | Status: SHIPPED | OUTPATIENT
Start: 2022-02-28 | End: 2022-09-02

## 2022-03-28 RX ORDER — LOSARTAN POTASSIUM 25 MG/1
TABLET ORAL
Qty: 45 TABLET | Refills: 3 | Status: SHIPPED | OUTPATIENT
Start: 2022-03-28

## 2022-04-12 RX ORDER — FUROSEMIDE 40 MG/1
TABLET ORAL
Qty: 180 TABLET | Refills: 2 | Status: SHIPPED | OUTPATIENT
Start: 2022-04-12 | End: 2023-03-27

## 2022-04-18 ENCOUNTER — APPOINTMENT (OUTPATIENT)
Dept: ONCOLOGY | Facility: HOSPITAL | Age: 77
End: 2022-04-18

## 2022-04-21 ENCOUNTER — OFFICE VISIT (OUTPATIENT)
Dept: ONCOLOGY | Facility: HOSPITAL | Age: 77
End: 2022-04-21

## 2022-04-21 ENCOUNTER — LAB (OUTPATIENT)
Dept: ONCOLOGY | Facility: HOSPITAL | Age: 77
End: 2022-04-21

## 2022-04-21 VITALS
BODY MASS INDEX: 34.3 KG/M2 | TEMPERATURE: 97.7 F | SYSTOLIC BLOOD PRESSURE: 121 MMHG | WEIGHT: 206.13 LBS | OXYGEN SATURATION: 100 % | RESPIRATION RATE: 18 BRPM | DIASTOLIC BLOOD PRESSURE: 69 MMHG | HEART RATE: 70 BPM

## 2022-04-21 DIAGNOSIS — R53.82 CHRONIC FATIGUE, UNSPECIFIED: ICD-10-CM

## 2022-04-21 DIAGNOSIS — C50.919 MALIGNANT NEOPLASM OF FEMALE BREAST, UNSPECIFIED ESTROGEN RECEPTOR STATUS, UNSPECIFIED LATERALITY, UNSPECIFIED SITE OF BREAST: Primary | ICD-10-CM

## 2022-04-21 DIAGNOSIS — D50.8 OTHER IRON DEFICIENCY ANEMIA: ICD-10-CM

## 2022-04-21 DIAGNOSIS — Z12.31 ENCOUNTER FOR SCREENING MAMMOGRAM FOR MALIGNANT NEOPLASM OF BREAST: ICD-10-CM

## 2022-04-21 LAB
BASOPHILS # BLD AUTO: 0.01 10*3/MM3 (ref 0–0.2)
BASOPHILS NFR BLD AUTO: 0.4 % (ref 0–1.5)
DEPRECATED RDW RBC AUTO: 50.7 FL (ref 37–54)
EOSINOPHIL # BLD AUTO: 0.14 10*3/MM3 (ref 0–0.4)
EOSINOPHIL NFR BLD AUTO: 5 % (ref 0.3–6.2)
ERYTHROCYTE [DISTWIDTH] IN BLOOD BY AUTOMATED COUNT: 13.2 % (ref 12.3–15.4)
FERRITIN SERPL-MCNC: 231.3 NG/ML (ref 13–150)
HCT VFR BLD AUTO: 38.7 % (ref 34–46.6)
HGB BLD-MCNC: 12.5 G/DL (ref 12–15.9)
IMM GRANULOCYTES # BLD AUTO: 0 10*3/MM3 (ref 0–0.05)
IMM GRANULOCYTES NFR BLD AUTO: 0 % (ref 0–0.5)
IRON 24H UR-MRATE: 155 MCG/DL (ref 37–145)
IRON SATN MFR SERPL: 41 % (ref 20–50)
LYMPHOCYTES # BLD AUTO: 0.75 10*3/MM3 (ref 0.7–3.1)
LYMPHOCYTES NFR BLD AUTO: 27 % (ref 19.6–45.3)
MCH RBC QN AUTO: 33.2 PG (ref 26.6–33)
MCHC RBC AUTO-ENTMCNC: 32.3 G/DL (ref 31.5–35.7)
MCV RBC AUTO: 102.7 FL (ref 79–97)
MONOCYTES # BLD AUTO: 0.34 10*3/MM3 (ref 0.1–0.9)
MONOCYTES NFR BLD AUTO: 12.2 % (ref 5–12)
NEUTROPHILS NFR BLD AUTO: 1.54 10*3/MM3 (ref 1.7–7)
NEUTROPHILS NFR BLD AUTO: 55.4 % (ref 42.7–76)
PLATELET # BLD AUTO: 97 10*3/MM3 (ref 140–450)
PMV BLD AUTO: 9.5 FL (ref 6–12)
RBC # BLD AUTO: 3.77 10*6/MM3 (ref 3.77–5.28)
TIBC SERPL-MCNC: 377 MCG/DL (ref 298–536)
TRANSFERRIN SERPL-MCNC: 253 MG/DL (ref 200–360)
WBC NRBC COR # BLD: 2.78 10*3/MM3 (ref 3.4–10.8)

## 2022-04-21 PROCEDURE — 84466 ASSAY OF TRANSFERRIN: CPT

## 2022-04-21 PROCEDURE — 82728 ASSAY OF FERRITIN: CPT

## 2022-04-21 PROCEDURE — G0463 HOSPITAL OUTPT CLINIC VISIT: HCPCS | Performed by: INTERNAL MEDICINE

## 2022-04-21 PROCEDURE — 85025 COMPLETE CBC W/AUTO DIFF WBC: CPT

## 2022-04-21 PROCEDURE — 99214 OFFICE O/P EST MOD 30 MIN: CPT | Performed by: INTERNAL MEDICINE

## 2022-04-21 PROCEDURE — 36415 COLL VENOUS BLD VENIPUNCTURE: CPT

## 2022-04-21 PROCEDURE — 83540 ASSAY OF IRON: CPT

## 2022-04-21 RX ORDER — AMOXICILLIN 500 MG/1
CAPSULE ORAL
COMMUNITY
End: 2022-07-21 | Stop reason: SDUPTHER

## 2022-04-21 RX ORDER — BENZONATATE 200 MG/1
CAPSULE ORAL
COMMUNITY
End: 2022-09-19 | Stop reason: ALTCHOICE

## 2022-04-21 RX ORDER — PREDNISONE 5 MG/1
TABLET ORAL SEE ADMIN INSTRUCTIONS
COMMUNITY
Start: 2022-01-31 | End: 2022-09-19 | Stop reason: ALTCHOICE

## 2022-04-21 NOTE — PROGRESS NOTES
Patient  Yareli Phillips    [facility] HEMATOLOGY & ONCOLOGY    Chief Complaint  Breast Cancer    Referring Provider: KISHAN Hyatt  PCP: Evangelina Bajwa APRN    Subjective          Oncology/Hematology History Overview Note     ER+/LA+/HER2+ Breast Cancer:            The breast mass was initially noted on screening mammogram.  Diagnostic     mammogram and ultrasound confirmed a 2.7 x 2.5 cm mass at the 3 o'clock     position.  She underwent biopsy on 4/6/2020.  Pathology was positive for     invasive ductal carcinoma, estrogen receptor positive (75%, moderate),     progesterone receptor positive (40%, moderate) HER-2 equivocal (2+ by IHC), HER-    2 FISH positive, Ki-67 55%.            ECHO Global Hypokinesis 45-50%            Cycle 1 of neoadjuvant TCH on 4/30/2020 -AUC of 4      Cycle 2 of TCH on 5/22/20 (60mg/kg Taxol)       Cycle 3 on 6/12/2020      Cycle 4 on 7/2/2020 - further chemotherapy was halted due to patient     intolerance.            Left breast lumpectomy 8/18/2020: Patient had a residual 15 mm tumor, grade 2     with no associated DCIS.  Lymph vascular invasion was present.  Margins are     negative.  4 lymph nodes were examined and all 4 were found to be neck.  Final     stage [id number].            Since the patient had residual disease on lumpectomy her treatment was switched     to Kadcyla which she will give her 1 year.  First cycle on 10/20/2020.  Decrease    the dose of Kadcyla after her first cycle due to nausea, fatigue, and decreased     appetite.  Cycle 2 on 11/10/2020.            After 2 cycles of Kadcyla patient was transitioned back 2 Herceptin due to     cytopenias. C7 with Herceptin on 12/1/20. Transitioned back to Kadcyla at a     reduced dose after developing LE edema of Herceptin but developed further     swelling and stopped anticancer therapy entirely.            Started anastrozole in January 2021         Malignant tumor of breast (HCC)    5/8/2020 Initial Diagnosis    Malignant tumor of breast (CMS/HCC)         HPI  Patient comes in today for follow-up regarding her history of breast cancer.  She is currently on anastrozole and believes she is tolerating it very well.  Her bone density test was done on 4/12/2022 and showed no evidence of osteoporosis or osteopenia.    Review of Systems   Constitutional: Positive for fatigue. Negative for appetite change, diaphoresis, fever, unexpected weight gain and unexpected weight loss.   HENT: Negative for hearing loss, sore throat and voice change.    Eyes: Negative for blurred vision, double vision, pain, redness and visual disturbance.   Respiratory: Negative for cough, shortness of breath and wheezing.    Cardiovascular: Negative for chest pain, palpitations and leg swelling.   Endocrine: Negative for cold intolerance, heat intolerance, polydipsia and polyuria.   Genitourinary: Negative for decreased urine volume, difficulty urinating, frequency and urinary incontinence.   Musculoskeletal: Positive for back pain (Lower back pain). Negative for arthralgias, joint swelling and myalgias.   Skin: Negative for color change, rash, skin lesions and wound.   Neurological: Negative for dizziness, seizures, numbness and headache.   Hematological: Negative for adenopathy. Does not bruise/bleed easily.   Psychiatric/Behavioral: Negative for depressed mood. The patient is not nervous/anxious.    All other systems reviewed and are negative.      Past Medical History:   Diagnosis Date   • Atrial fibrillation (HCC)    • Breast cancer (HCC)    • CHF (congestive heart failure) (HCC)    • Hypertension    • Presence of cardiac pacemaker 2/7/2020     Past Surgical History:   Procedure Laterality Date   • BREAST LUMPECTOMY     • CYST REMOVAL     • PORTACATH PLACEMENT       Social History     Socioeconomic History   • Marital status:    Tobacco Use   • Smoking status: Never Smoker   • Smokeless tobacco: Never Used    Vaping Use   • Vaping Use: Never used   Substance and Sexual Activity   • Alcohol use: Never   • Drug use: Never   • Sexual activity: Defer     Family History   Problem Relation Age of Onset   • Breast cancer Mother    • Bone cancer Mother    • Bone cancer Father        Objective   Physical Exam  General: Alert, cooperative, no acute distress, well appearing  Eyes: Anicteric sclera, PERRLA  Respiratory: normal respiratory effort  Cardiovascular: no lower extremity edema  Skin: Normal tone, no rash, no lesions  Psychiatric: Appropriate affect, intact judgment  Neurologic: No focal sensory or motor deficits, normal cognition   Musculoskeletal: Normal muscle strength and tone  Extremities: No clubbing, cyanosis, or deformities    Vitals:    04/21/22 0949   BP: 121/69   Pulse: 70   Resp: 18   Temp: 97.7 °F (36.5 °C)   SpO2: 100%   Weight: 93.5 kg (206 lb 2.1 oz)   PainSc:   7   PainLoc: Back     ECOG score: 0         PHQ-9 Total Score:         Result Review :   The following data was reviewed by: Jocelyn Camp MD PhD on 04/21/2022:  Lab Results   Component Value Date    HGB 12.5 04/21/2022    HCT 38.7 04/21/2022    .7 (H) 04/21/2022    PLT 97 (L) 04/21/2022    WBC 2.78 (L) 04/21/2022    NEUTROABS 1.54 (L) 04/21/2022    LYMPHSABS 0.75 04/21/2022    MONOSABS 0.34 04/21/2022    EOSABS 0.14 04/21/2022    BASOSABS 0.01 04/21/2022     Lab Results   Component Value Date    GLUCOSE 86 08/19/2021    BUN 33 (H) 08/19/2021    CREATININE 1.04 (H) 08/19/2021     (L) 08/19/2021    K 4.1 08/19/2021    CL 97 (L) 08/19/2021    CO2 26.1 08/19/2021    CALCIUM 10.4 08/19/2021    PROTEINTOT 7.2 08/19/2021    ALBUMIN 4.10 08/19/2021    BILITOT 1.6 (H) 08/19/2021    ALKPHOS 225 (H) 08/19/2021    AST 27 08/19/2021    ALT 13 08/19/2021          Assessment and Plan    Diagnoses and all orders for this visit:    1. Malignant neoplasm of female breast, unspecified estrogen receptor status, unspecified laterality, unspecified  site of breast (HCC) (Primary)  -     CBC & Differential; Future  -     Comprehensive Metabolic Panel; Future  -     Vitamin B12; Future  -     TSH; Future  -     Folate; Future    2. Encounter for screening mammogram for malignant neoplasm of breast  -     Mammo screening digital tomosynthesis bilateral w CAD; Future    3. Other iron deficiency anemia  -     Vitamin B12; Future  -     TSH; Future  -     Folate; Future    4. Chronic fatigue, unspecified   -     TSH; Future      ER+/HER2+ Breast Cancer: The patient completed chemotherapy with herceptin and is now on adjuvant endocrine therapy with anastrozole. She is tolerating it well and will continue.  I will plan for her next mammogram in June and f/u after.  Her DEXA scan was normal and will be repeated in 2 years.     LARISSA: The pt will continue oral iron.  I will add iron studies to her repeat labs which will be done prior to her next f/u appt.     Patient was given instructions and counseling regarding her condition or for health maintenance advice. Please see specific information pulled into the AVS if appropriate.

## 2022-06-02 RX ORDER — ANASTROZOLE 1 MG/1
1 TABLET ORAL DAILY
Qty: 90 TABLET | Refills: 1 | Status: SHIPPED | OUTPATIENT
Start: 2022-06-02 | End: 2022-07-21

## 2022-07-21 ENCOUNTER — LAB (OUTPATIENT)
Dept: ONCOLOGY | Facility: HOSPITAL | Age: 77
End: 2022-07-21

## 2022-07-21 ENCOUNTER — OFFICE VISIT (OUTPATIENT)
Dept: ONCOLOGY | Facility: HOSPITAL | Age: 77
End: 2022-07-21

## 2022-07-21 VITALS
DIASTOLIC BLOOD PRESSURE: 62 MMHG | RESPIRATION RATE: 18 BRPM | OXYGEN SATURATION: 99 % | WEIGHT: 207.67 LBS | BODY MASS INDEX: 34.56 KG/M2 | SYSTOLIC BLOOD PRESSURE: 127 MMHG | HEART RATE: 73 BPM | TEMPERATURE: 97.8 F

## 2022-07-21 DIAGNOSIS — D72.819 LEUKOPENIA, UNSPECIFIED TYPE: Primary | ICD-10-CM

## 2022-07-21 DIAGNOSIS — D50.8 OTHER IRON DEFICIENCY ANEMIA: ICD-10-CM

## 2022-07-21 DIAGNOSIS — C50.919 MALIGNANT NEOPLASM OF FEMALE BREAST, UNSPECIFIED ESTROGEN RECEPTOR STATUS, UNSPECIFIED LATERALITY, UNSPECIFIED SITE OF BREAST: ICD-10-CM

## 2022-07-21 DIAGNOSIS — R53.82 CHRONIC FATIGUE, UNSPECIFIED: ICD-10-CM

## 2022-07-21 LAB
ALBUMIN SERPL-MCNC: 4 G/DL (ref 3.5–5.2)
ALBUMIN/GLOB SERPL: 1.7 G/DL
ALP SERPL-CCNC: 99 U/L (ref 39–117)
ALT SERPL W P-5'-P-CCNC: 19 U/L (ref 1–33)
ANION GAP SERPL CALCULATED.3IONS-SCNC: 3.7 MMOL/L (ref 5–15)
AST SERPL-CCNC: 26 U/L (ref 1–32)
BASOPHILS # BLD AUTO: 0.01 10*3/MM3 (ref 0–0.2)
BASOPHILS NFR BLD AUTO: 0.5 % (ref 0–1.5)
BILIRUB SERPL-MCNC: 0.6 MG/DL (ref 0–1.2)
BUN SERPL-MCNC: 24 MG/DL (ref 8–23)
BUN/CREAT SERPL: 24.2 (ref 7–25)
CALCIUM SPEC-SCNC: 9.9 MG/DL (ref 8.6–10.5)
CHLORIDE SERPL-SCNC: 104 MMOL/L (ref 98–107)
CO2 SERPL-SCNC: 29.3 MMOL/L (ref 22–29)
CREAT SERPL-MCNC: 0.99 MG/DL (ref 0.57–1)
DEPRECATED RDW RBC AUTO: 48.9 FL (ref 37–54)
EGFRCR SERPLBLD CKD-EPI 2021: 59.6 ML/MIN/1.73
EOSINOPHIL # BLD AUTO: 0.07 10*3/MM3 (ref 0–0.4)
EOSINOPHIL NFR BLD AUTO: 3.2 % (ref 0.3–6.2)
ERYTHROCYTE [DISTWIDTH] IN BLOOD BY AUTOMATED COUNT: 12.9 % (ref 12.3–15.4)
FOLATE SERPL-MCNC: 16.9 NG/ML (ref 4.78–24.2)
GLOBULIN UR ELPH-MCNC: 2.4 GM/DL
GLUCOSE SERPL-MCNC: 91 MG/DL (ref 65–99)
HCT VFR BLD AUTO: 36.7 % (ref 34–46.6)
HGB BLD-MCNC: 12 G/DL (ref 12–15.9)
IMM GRANULOCYTES # BLD AUTO: 0 10*3/MM3 (ref 0–0.05)
IMM GRANULOCYTES NFR BLD AUTO: 0 % (ref 0–0.5)
LYMPHOCYTES # BLD AUTO: 0.62 10*3/MM3 (ref 0.7–3.1)
LYMPHOCYTES NFR BLD AUTO: 28.7 % (ref 19.6–45.3)
MCH RBC QN AUTO: 33.1 PG (ref 26.6–33)
MCHC RBC AUTO-ENTMCNC: 32.7 G/DL (ref 31.5–35.7)
MCV RBC AUTO: 101.4 FL (ref 79–97)
MONOCYTES # BLD AUTO: 0.24 10*3/MM3 (ref 0.1–0.9)
MONOCYTES NFR BLD AUTO: 11.1 % (ref 5–12)
NEUTROPHILS NFR BLD AUTO: 1.22 10*3/MM3 (ref 1.7–7)
NEUTROPHILS NFR BLD AUTO: 56.5 % (ref 42.7–76)
PLATELET # BLD AUTO: 112 10*3/MM3 (ref 140–450)
PMV BLD AUTO: 9.4 FL (ref 6–12)
POTASSIUM SERPL-SCNC: 4.9 MMOL/L (ref 3.5–5.2)
PROT SERPL-MCNC: 6.4 G/DL (ref 6–8.5)
RBC # BLD AUTO: 3.62 10*6/MM3 (ref 3.77–5.28)
SODIUM SERPL-SCNC: 137 MMOL/L (ref 136–145)
TSH SERPL DL<=0.05 MIU/L-ACNC: 3 UIU/ML (ref 0.27–4.2)
VIT B12 BLD-MCNC: 332 PG/ML (ref 211–946)
WBC NRBC COR # BLD: 2.16 10*3/MM3 (ref 3.4–10.8)

## 2022-07-21 PROCEDURE — 82607 VITAMIN B-12: CPT

## 2022-07-21 PROCEDURE — 82746 ASSAY OF FOLIC ACID SERUM: CPT

## 2022-07-21 PROCEDURE — 80053 COMPREHEN METABOLIC PANEL: CPT

## 2022-07-21 PROCEDURE — G0463 HOSPITAL OUTPT CLINIC VISIT: HCPCS | Performed by: NURSE PRACTITIONER

## 2022-07-21 PROCEDURE — 36415 COLL VENOUS BLD VENIPUNCTURE: CPT

## 2022-07-21 PROCEDURE — 99214 OFFICE O/P EST MOD 30 MIN: CPT | Performed by: NURSE PRACTITIONER

## 2022-07-21 PROCEDURE — 84443 ASSAY THYROID STIM HORMONE: CPT

## 2022-07-21 PROCEDURE — 85025 COMPLETE CBC W/AUTO DIFF WBC: CPT

## 2022-07-21 RX ORDER — ANASTROZOLE 1 MG/1
1 TABLET ORAL DAILY
Qty: 90 TABLET | Refills: 3 | Status: SHIPPED | OUTPATIENT
Start: 2022-07-21

## 2022-07-21 NOTE — PROGRESS NOTES
Chief Complaint  Breast Cancer    Evangelina Bajwa, APRN  Garett, Evangelina PARRA, APRN      Subjective          Yareli Phillips presents to [facility] HEMATOLOGY & ONCOLOGY for breast cancer follow up.     History of Present Illness    Ms. Yareli Phillips presents for follow up for breast cancer. She normally sees Dr. Camp. She is doing fairly well in the interim. She reports she had a circular rash to the left breast about 1 week ago. She saw her PCP and was given Nystatin for this and the rash is improving. Still has some mild redness noted to the left breast. She was instructed to continue Nystatin cream for another week or so. She is taking Anastrozole and tolerating well. Occasionally has mild nausea and she takes the med at nite. Denies any headache, bone or back pain or persistent cough. She does have peripheral neuropathy to lower extremities for which she takes gabapentin for. I offered for her to see PT or neurology for the worsening PN but she declined stating she has to take her of her sick  and get a sitter when she has MD appointments.      Mammo:FINDINGS: 7/19/2022.   The breast composition is heterogeneously dense that can obscure small breast masses.     Scattered benign calcifications are seen.  No dense spiculated masses or suspicious microcalcifications are identified.  No architectural distortion is identified.  There is no skin thickening or retraction.     Lumpectomy changes in the upper outer quadrant left breast   completed on 7/19/22.  Cancer Staging  No matching staging information was found for the patient.     Treatment intent: curative    Oncology/Hematology History Overview Note     ER+/OK+/HER2+ Breast Cancer:            The breast mass was initially noted on screening mammogram.  Diagnostic     mammogram and ultrasound confirmed a 2.7 x 2.5 cm mass at the 3 o'clock     position.  She underwent biopsy on 4/6/2020.  Pathology was positive for     invasive ductal  carcinoma, estrogen receptor positive (75%, moderate),     progesterone receptor positive (40%, moderate) HER-2 equivocal (2+ by IHC), HER-    2 FISH positive, Ki-67 55%.            ECHO Global Hypokinesis 45-50%            Cycle 1 of neoadjuvant TCH on 4/30/2020 -AUC of 4      Cycle 2 of TCH on 5/22/20 (60mg/kg Taxol)       Cycle 3 on 6/12/2020      Cycle 4 on 7/2/2020 - further chemotherapy was halted due to patient     intolerance.            Left breast lumpectomy 8/18/2020: Patient had a residual 15 mm tumor, grade 2     with no associated DCIS.  Lymph vascular invasion was present.  Margins are     negative.  4 lymph nodes were examined and all 4 were found to be neck.  Final     stage [id number].            Since the patient had residual disease on lumpectomy her treatment was switched     to Kadcyla which she will give her 1 year.  First cycle on 10/20/2020.  Decrease    the dose of Kadcyla after her first cycle due to nausea, fatigue, and decreased     appetite.  Cycle 2 on 11/10/2020.            After 2 cycles of Kadcyla patient was transitioned back 2 Herceptin due to     cytopenias. C7 with Herceptin on 12/1/20. Transitioned back to Kadcyla at a     reduced dose after developing LE edema of Herceptin but developed further     swelling and stopped anticancer therapy entirely.            Started anastrozole in January 2021         Malignant tumor of breast (HCC)   5/8/2020 Initial Diagnosis    Malignant tumor of breast (CMS/HCC)         Review of Systems   Constitutional: Positive for fatigue.   HENT: Negative.    Eyes: Negative.    Respiratory: Negative.    Cardiovascular: Negative.    Gastrointestinal: Negative.    Endocrine: Negative.    Genitourinary: Positive for breast pain.   Musculoskeletal: Negative.    Skin: Positive for rash (skin change on left breast).   Neurological: Negative.    Hematological: Negative.    Psychiatric/Behavioral: Negative.    All other systems reviewed and are  negative.      Current Outpatient Medications on File Prior to Visit   Medication Sig Dispense Refill   • acetaminophen (TYLENOL) 500 MG tablet Take 500 mg by mouth Every 6 (Six) Hours As Needed for Mild Pain .     • benzonatate (TESSALON) 200 MG capsule benzonatate 200 mg capsule   TAKE 1 CAPSULE BY MOUTH THREE TIMES DAILY AS NEEDED     • carvedilol (COREG) 25 MG tablet Take 1 tablet by mouth Every 12 (Twelve) Hours. 180 tablet 3   • Eliquis 5 MG tablet tablet TAKE ONE TABLET BY MOUTH TWICE DAILY 180 tablet 3   • ferrous gluconate (FERGON) 324 MG tablet TAKE 1 TABLET BY MOUTH DAILY WITH BREAKFAST 30 tablet 5   • furosemide (LASIX) 40 MG tablet TAKE 1 TABLET BY MOUTH TWICE DAILY AND AS NEEDED 180 tablet 2   • losartan (COZAAR) 25 MG tablet TAKE ONE-HALF TABLET BY MOUTH EVERY NIGHT AT BEDTIME 45 tablet 3   • meloxicam (MOBIC) 7.5 MG tablet meloxicam 7.5 mg tablet   TAKE 1 TABLET BY MOUTH EVERY DAY     • predniSONE 5 MG (21) tablet therapy pack dose pack See Admin Instructions. follow package directions     • silver sulfadiazine (SILVADENE, SSD) 1 % cream silver sulfadiazine 1 % topical cream   APPLY TO AFFECTED AREA BID     • spironolactone (ALDACTONE) 25 MG tablet Take 1 tablet by mouth Every 12 (Twelve) Hours. 180 tablet 3   • traMADol (ULTRAM) 50 MG tablet Take 50 mg by mouth 2 (two) times a day.     • [DISCONTINUED] anastrozole (ARIMIDEX) 1 MG tablet TAKE 1 TABLET BY MOUTH DAILY 90 tablet 1   • gabapentin (NEURONTIN) 300 MG capsule Take 1 capsule by mouth 2 (two) times a day for 90 days. 180 capsule 2   • [DISCONTINUED] amoxicillin (AMOXIL) 500 MG capsule amoxicillin 500 mg capsule   TAKE 1 CAPSULE BY MOUTH EVERY 8 HOURS       No current facility-administered medications on file prior to visit.       Allergies   Allergen Reactions   • Morphine Nausea And Vomiting   • Meperidine Rash     Past Medical History:   Diagnosis Date   • Atrial fibrillation (HCC)    • Breast cancer (HCC)    • CHF (congestive heart  failure) (HCC)    • Hypertension    • Presence of cardiac pacemaker 2/7/2020     Past Surgical History:   Procedure Laterality Date   • BREAST LUMPECTOMY     • CYST REMOVAL     • PORTACATH PLACEMENT       Social History     Socioeconomic History   • Marital status:    Tobacco Use   • Smoking status: Never Smoker   • Smokeless tobacco: Never Used   Vaping Use   • Vaping Use: Never used   Substance and Sexual Activity   • Alcohol use: Never   • Drug use: Never   • Sexual activity: Defer     Family History   Problem Relation Age of Onset   • Breast cancer Mother    • Bone cancer Mother    • Bone cancer Father      Immunization History   Administered Date(s) Administered   • COVID-19 (MODERNA) 1st, 2nd, 3rd Dose Only 02/25/2021, 03/18/2021   • COVID-19 (PFIZER) PURPLE CAP 02/25/2021, 03/18/2021   • Flu Vaccine Split Quad 10/09/2018   • Fluzone High-Dose 65+yrs 10/28/2021   • Fluzone Split Quad (Multi-dose) 09/20/2019   • Hepatitis A 08/08/2018   • Influenza, Unspecified 09/20/2019   • Pneumococcal Conjugate 13-Valent (PCV13) 10/25/2016   • Pneumococcal Polysaccharide (PPSV23) 04/27/2022       Objective   Physical Exam  Vitals and nursing note reviewed.   Constitutional:       Appearance: Normal appearance. She is obese.   HENT:      Head: Normocephalic.      Nose: Nose normal.      Mouth/Throat:      Mouth: Mucous membranes are moist.   Eyes:      Pupils: Pupils are equal, round, and reactive to light.   Cardiovascular:      Rate and Rhythm: Normal rate and regular rhythm.      Pulses: Normal pulses.      Heart sounds: Normal heart sounds.   Pulmonary:      Effort: No respiratory distress.      Breath sounds: Normal breath sounds. No wheezing, rhonchi or rales.   Abdominal:      Palpations: Abdomen is soft.   Musculoskeletal:         General: Normal range of motion.      Cervical back: Normal range of motion and neck supple.   Skin:     General: Skin is warm and dry.      Capillary Refill: Capillary refill takes  less than 2 seconds.   Neurological:      General: No focal deficit present.      Mental Status: She is alert and oriented to person, place, and time.   Psychiatric:         Mood and Affect: Mood normal.         Behavior: Behavior normal.         Thought Content: Thought content normal.         Judgment: Judgment normal.     bilateral breasts: no palpable lumps or masses present to either breast.  Left inner central breast with small amount of erythema to the breast area. Last week circular rash present and is using Nystatin cream. Site is improving.     Vitals:    07/21/22 1103   BP: 127/62   Pulse: 73   Resp: 18   Temp: 97.8 °F (36.6 °C)   SpO2: 99%   Weight: 94.2 kg (207 lb 10.8 oz)   PainSc: 0-No pain     ECOG score: 0         ECOG: (0) Fully Active - Able to Carry On All Pre-disease Performance Without Restriction  Fall Risk Assessment was completed, and patient is at low risk for falls.  PHQ-9 Total Score: 0       The patient is  experiencing fatigue. Fatigue score: 5    PT/OT Functional Screening: PT fx screen: No needs identified  Speech Functional Screening: Speech fx screen: No needs identified  Rehab to be ordered: Rehab to be ordered: No needs identified        Result Review :   The following data was reviewed by: KISHAN Johns on 07/21/2022:  Lab Results   Component Value Date    HGB 12.0 07/21/2022    HCT 36.7 07/21/2022    .4 (H) 07/21/2022     (L) 07/21/2022    WBC 2.16 (L) 07/21/2022    NEUTROABS 1.22 (L) 07/21/2022    LYMPHSABS 0.62 (L) 07/21/2022    MONOSABS 0.24 07/21/2022    EOSABS 0.07 07/21/2022    BASOSABS 0.01 07/21/2022     Lab Results   Component Value Date    GLUCOSE 91 07/21/2022    BUN 24 (H) 07/21/2022    CREATININE 0.99 07/21/2022     07/21/2022    K 4.9 07/21/2022     07/21/2022    CO2 29.3 (H) 07/21/2022    CALCIUM 9.9 07/21/2022    PROTEINTOT 6.4 07/21/2022    ALBUMIN 4.00 07/21/2022    BILITOT 0.6 07/21/2022    ALKPHOS 99 07/21/2022    AST  26 07/21/2022    ALT 19 07/21/2022          Assessment and Plan    Diagnoses and all orders for this visit:    1. Leukopenia, unspecified type (Primary)  -     Peripheral Blood Smear; Future  -     CBC & Differential; Future    2. Other iron deficiency anemia    3. Malignant neoplasm of female breast, unspecified estrogen receptor status, unspecified laterality, unspecified site of breast (HCC)    Other orders  -     anastrozole (ARIMIDEX) 1 MG tablet; Take 1 tablet by mouth Daily.  Dispense: 90 tablet; Refill: 3      Recent lab work with leukopenia. Present in April as well. Denies any fevers, chills, weight loss, night sweats.     Will check a peripheral smear. Folate and b-12 are pending for today. Anastrozole refilled.     Follow up with Dr. Camp in 6 months after labs.     Patient will call if rash to the left breast does not improve for further investigation. She will continue the Nystatin cream for another week or so.     Patient Follow Up: 6 months with Dr. Camp    Patient was given instructions and counseling regarding her condition or for health maintenance advice. Please see specific information pulled into the AVS if appropriate.     Milena Tirado, APRN    7/21/2022

## 2022-09-01 DIAGNOSIS — D50.8 OTHER IRON DEFICIENCY ANEMIA: ICD-10-CM

## 2022-09-02 RX ORDER — DOXYCYCLINE HYCLATE 50 MG/1
324 CAPSULE, GELATIN COATED ORAL
Qty: 30 TABLET | Refills: 5 | Status: SHIPPED | OUTPATIENT
Start: 2022-09-02 | End: 2023-04-03

## 2022-09-19 ENCOUNTER — OFFICE VISIT (OUTPATIENT)
Dept: CARDIOLOGY | Facility: CLINIC | Age: 77
End: 2022-09-19

## 2022-09-19 VITALS
HEART RATE: 84 BPM | WEIGHT: 209 LBS | BODY MASS INDEX: 34.82 KG/M2 | HEIGHT: 65 IN | SYSTOLIC BLOOD PRESSURE: 122 MMHG | DIASTOLIC BLOOD PRESSURE: 60 MMHG

## 2022-09-19 DIAGNOSIS — E66.01 MORBID (SEVERE) OBESITY DUE TO EXCESS CALORIES: ICD-10-CM

## 2022-09-19 DIAGNOSIS — I10 PRIMARY HYPERTENSION: Primary | ICD-10-CM

## 2022-09-19 DIAGNOSIS — I82.432 ACUTE DEEP VEIN THROMBOSIS (DVT) OF POPLITEAL VEIN OF LEFT LOWER EXTREMITY: ICD-10-CM

## 2022-09-19 DIAGNOSIS — I50.42 CHRONIC COMBINED SYSTOLIC AND DIASTOLIC CONGESTIVE HEART FAILURE: ICD-10-CM

## 2022-09-19 DIAGNOSIS — I48.20 CHRONIC ATRIAL FIBRILLATION: ICD-10-CM

## 2022-09-19 PROCEDURE — 99214 OFFICE O/P EST MOD 30 MIN: CPT | Performed by: INTERNAL MEDICINE

## 2022-09-19 NOTE — ASSESSMENT & PLAN NOTE
.She has chronic permanent atrial fibrillation with controlled ventricular response.  She will continue carvedilol 25 mg twice a day

## 2022-09-19 NOTE — ASSESSMENT & PLAN NOTE
Patient has mild decrease in liver systolic function with chronic combined CHF.  She is on low-dose Cozaar 25 mg half a tablet a day and furosemide 40 mg twice daily.  She is also on spironolactone 1 tablet twice daily.  She is reluctant to increase her dose of losartan since she is worried about hypotension.  We will have her do a blood pressure log

## 2022-09-19 NOTE — ASSESSMENT & PLAN NOTE
Her hypertension is controlled on carvedilol and losartan furosemide and spironolactone.  She will continue all 4 in the current dosage

## 2022-09-19 NOTE — ASSESSMENT & PLAN NOTE
I am concerned about the pain and swelling in her left lower extremity.  She is on chronic anticoagulation with apixaban for A. fib, however, I need to exclude the possibility of DVT.  We will obtain venous examination of the lower extremity as soon as possible to rule out DVT.

## 2022-09-19 NOTE — PROGRESS NOTES
Office Visit    Chief Complaint  Hypertension and Congestive Heart Failure    Subjective            Yareli Phillips presents to [facility] CARDIOLOGY  History of Present Illness  Ms. Phillips is a 77 years old female with congestive heart failure, chronic combined with mild reduction (systolic function who was done reasonably well.  She recently twisted her left leg and has swelling and pain on the backside of her thigh and calf with significant swelling.  She denies paroxysmal nocturnal dyspnea orthopnea dizziness or syncope.  She has chronic atrial fibrillation and is already on anticoagulation.      Past Medical History:   Diagnosis Date   • Atrial fibrillation (HCC)    • Breast cancer (HCC)    • CHF (congestive heart failure) (HCC)    • Hypertension    • Presence of cardiac pacemaker 2/7/2020       Allergies   Allergen Reactions   • Morphine Nausea And Vomiting   • Meperidine Rash        Past Surgical History:   Procedure Laterality Date   • BREAST LUMPECTOMY     • CYST REMOVAL     • PORTACATH PLACEMENT          Social History     Tobacco Use   • Smoking status: Never Smoker   • Smokeless tobacco: Never Used   Vaping Use   • Vaping Use: Never used   Substance Use Topics   • Alcohol use: Never   • Drug use: Never       Family History   Problem Relation Age of Onset   • Breast cancer Mother    • Bone cancer Mother    • Bone cancer Father         Prior to Admission medications    Medication Sig Start Date End Date Taking? Authorizing Provider   acetaminophen (TYLENOL) 500 MG tablet Take 500 mg by mouth Every 6 (Six) Hours As Needed for Mild Pain .    Provider, MD Yin   anastrozole (ARIMIDEX) 1 MG tablet Take 1 tablet by mouth Daily. 7/21/22   Milena Tirado APRN   benzonatate (TESSALON) 200 MG capsule benzonatate 200 mg capsule   TAKE 1 CAPSULE BY MOUTH THREE TIMES DAILY AS NEEDED    Provider, MD Yin   carvedilol (COREG) 25 MG tablet Take 1 tablet by mouth Every 12 (Twelve)  "Hours. 2/23/22   Evelin Canela June, APRN   Eliquis 5 MG tablet tablet TAKE ONE TABLET BY MOUTH TWICE DAILY 11/29/21   vEelin Canela June, APRN   ferrous gluconate (FERGON) 324 MG tablet TAKE 1 TABLET BY MOUTH DAILY WITH BREAKFAST 9/2/22   Jocelyn Camp MD PhD   furosemide (LASIX) 40 MG tablet TAKE 1 TABLET BY MOUTH TWICE DAILY AND AS NEEDED 4/12/22   Amos Vazquez MD   gabapentin (NEURONTIN) 300 MG capsule Take 1 capsule by mouth 2 (two) times a day for 90 days. 10/19/21 2/23/22  Jocelyn Camp MD PhD   losartan (COZAAR) 25 MG tablet TAKE ONE-HALF TABLET BY MOUTH EVERY NIGHT AT BEDTIME 3/28/22   Amos Vazquez MD   meloxicam (MOBIC) 7.5 MG tablet meloxicam 7.5 mg tablet   TAKE 1 TABLET BY MOUTH EVERY DAY    ProviderYin MD   predniSONE 5 MG (21) tablet therapy pack dose pack See Admin Instructions. follow package directions 1/31/22   Yni Swanson MD   silver sulfadiazine (SILVADENE, SSD) 1 % cream silver sulfadiazine 1 % topical cream   APPLY TO AFFECTED AREA BID    Yin Swanson MD   spironolactone (ALDACTONE) 25 MG tablet Take 1 tablet by mouth Every 12 (Twelve) Hours. 2/23/22   Evelin Canela June, APRMARY   traMADol (ULTRAM) 50 MG tablet Take 50 mg by mouth 2 (two) times a day.    ProviderYin MD        Review of Systems   Constitutional: Negative for fatigue.   Respiratory: Positive for shortness of breath. Negative for cough.    Cardiovascular: Positive for palpitations and leg swelling. Negative for chest pain.   Neurological: Negative for dizziness.        Objective     /60   Pulse 84   Ht 165.1 cm (65\")   Wt 94.8 kg (209 lb)   BMI 34.78 kg/m²       Physical Exam  Constitutional:       General: She is awake.      Appearance: Normal appearance.   Neck:      Thyroid: No thyromegaly.      Vascular: No carotid bruit or JVD.   Cardiovascular:      Rate and Rhythm: Normal rate. Rhythm irregular.      Chest Wall: PMI is not displaced.      Pulses: Normal pulses.    "   Heart sounds: Normal heart sounds, S1 normal and S2 normal. No murmur heard.    No friction rub. No gallop. No S3 or S4 sounds.   Pulmonary:      Effort: Pulmonary effort is normal.      Breath sounds: Normal breath sounds and air entry. No wheezing, rhonchi or rales.   Abdominal:      General: Bowel sounds are normal.      Palpations: Abdomen is soft. There is no mass.      Tenderness: There is no abdominal tenderness.   Musculoskeletal:      Cervical back: Neck supple.      Right lower leg: No edema.      Left lower leg: No edema.   Neurological:      Mental Status: She is alert and oriented to person, place, and time.   Psychiatric:         Mood and Affect: Mood normal.         Behavior: Behavior is cooperative.       Lab Results   Component Value Date    BNP 1,110 (H) 09/08/2021     06/12/2020     CMP    CMP 7/21/22   Glucose 91   BUN 24 (A)   Creatinine 0.99   Sodium 137   Potassium 4.9   Chloride 104   Calcium 9.9   Albumin 4.00   Total Bilirubin 0.6   Alkaline Phosphatase 99   AST (SGOT) 26   ALT (SGPT) 19   (A) Abnormal value            CBC w/diff    CBC w/Diff 4/21/22 7/21/22   WBC 2.78 (A) 2.16 (A)   RBC 3.77 3.62 (A)   Hemoglobin 12.5 12.0   Hematocrit 38.7 36.7   .7 (A) 101.4 (A)   MCH 33.2 (A) 33.1 (A)   MCHC 32.3 32.7   RDW 13.2 12.9   Platelets 97 (A) 112 (A)   Neutrophil Rel % 55.4 56.5   Immature Granulocyte Rel % 0.0 0.0   Lymphocyte Rel % 27.0 28.7   Monocyte Rel % 12.2 (A) 11.1   Eosinophil Rel % 5.0 3.2   Basophil Rel % 0.4 0.5   (A) Abnormal value                Lab Results   Component Value Date    TSH 3.000 07/21/2022    TSH 3.200 11/17/2020      Lab Results   Component Value Date    FREET4 1.1 11/17/2020      No results found for: DDIMERQUANT  No results found for: MG   No results found for: DIGOXIN                Result Review :                           Assessment and Plan        Diagnoses and all orders for this visit:    1. Primary hypertension (Primary)  Assessment &  Plan:  Her hypertension is controlled on carvedilol and losartan furosemide and spironolactone.  She will continue all 4 in the current dosage    Orders:  -     Lipid Panel; Future  -     Comprehensive Metabolic Panel; Future  -     Magnesium; Future  -     CBC & Differential; Future  -     TSH; Future  -     Duplex Venous Lower Extremity - Bilateral CAR; Future    2. Chronic atrial fibrillation (HCC)  Assessment & Plan:  .She has chronic permanent atrial fibrillation with controlled ventricular response.  She will continue carvedilol 25 mg twice a day    Orders:  -     Lipid Panel; Future  -     Comprehensive Metabolic Panel; Future  -     Magnesium; Future  -     CBC & Differential; Future  -     TSH; Future  -     Duplex Venous Lower Extremity - Bilateral CAR; Future    3. Chronic combined systolic and diastolic congestive heart failure (HCC)  Assessment & Plan:  Patient has mild decrease in liver systolic function with chronic combined CHF.  She is on low-dose Cozaar 25 mg half a tablet a day and furosemide 40 mg twice daily.  She is also on spironolactone 1 tablet twice daily.  She is reluctant to increase her dose of losartan since she is worried about hypotension.  We will have her do a blood pressure log    Orders:  -     Lipid Panel; Future  -     Comprehensive Metabolic Panel; Future  -     Magnesium; Future  -     CBC & Differential; Future  -     TSH; Future  -     Duplex Venous Lower Extremity - Bilateral CAR; Future    4. Acute deep vein thrombosis (DVT) of popliteal vein of left lower extremity (HCC)  Assessment & Plan:  I am concerned about the pain and swelling in her left lower extremity.  She is on chronic anticoagulation with apixaban for A. fib, however, I need to exclude the possibility of DVT.  We will obtain venous examination of the lower extremity as soon as possible to rule out DVT.    Orders:  -     Duplex Venous Lower Extremity - Bilateral CAR; Future    5. Morbid (severe) obesity due to  excess calories (HCC)    Other orders  -     apixaban (Eliquis) 5 MG tablet tablet; Take 1 tablet by mouth 2 (Two) Times a Day.  Dispense: 180 tablet; Refill: 3          Follow Up     Return in about 6 months (around 3/19/2023) for dr Suero.  VELE asap.    Patient was given instructions and counseling regarding her condition or for health maintenance advice. Please see specific information pulled into the AVS if appropriate.     Amos Vazqeuz MD  09/19/22 14:36 EDT

## 2022-09-29 ENCOUNTER — HOSPITAL ENCOUNTER (OUTPATIENT)
Dept: CARDIOLOGY | Facility: HOSPITAL | Age: 77
Discharge: HOME | End: 2022-09-29
Admitting: INTERNAL MEDICINE

## 2022-09-29 DIAGNOSIS — I10 PRIMARY HYPERTENSION: ICD-10-CM

## 2022-09-29 DIAGNOSIS — I48.20 CHRONIC ATRIAL FIBRILLATION: ICD-10-CM

## 2022-09-29 DIAGNOSIS — I82.432 ACUTE DEEP VEIN THROMBOSIS (DVT) OF POPLITEAL VEIN OF LEFT LOWER EXTREMITY: ICD-10-CM

## 2022-09-29 DIAGNOSIS — I50.42 CHRONIC COMBINED SYSTOLIC AND DIASTOLIC CONGESTIVE HEART FAILURE: ICD-10-CM

## 2022-09-29 PROCEDURE — 93970 EXTREMITY STUDY: CPT | Performed by: SURGERY

## 2022-09-29 PROCEDURE — 93970 EXTREMITY STUDY: CPT

## 2022-09-30 LAB
BH CV LOWER VASCULAR LEFT COMMON FEMORAL AUGMENT: NORMAL
BH CV LOWER VASCULAR LEFT COMMON FEMORAL COMPETENT: NORMAL
BH CV LOWER VASCULAR LEFT COMMON FEMORAL COMPRESS: NORMAL
BH CV LOWER VASCULAR LEFT COMMON FEMORAL PHASIC: NORMAL
BH CV LOWER VASCULAR LEFT COMMON FEMORAL SPONT: NORMAL
BH CV LOWER VASCULAR LEFT DISTAL FEMORAL COMPRESS: NORMAL
BH CV LOWER VASCULAR LEFT GASTRONEMIUS COMPRESS: NORMAL
BH CV LOWER VASCULAR LEFT GREATER SAPH AK COMPRESS: NORMAL
BH CV LOWER VASCULAR LEFT GREATER SAPH BK COMPRESS: NORMAL
BH CV LOWER VASCULAR LEFT MID FEMORAL AUGMENT: NORMAL
BH CV LOWER VASCULAR LEFT MID FEMORAL COMPETENT: NORMAL
BH CV LOWER VASCULAR LEFT MID FEMORAL COMPRESS: NORMAL
BH CV LOWER VASCULAR LEFT MID FEMORAL PHASIC: NORMAL
BH CV LOWER VASCULAR LEFT MID FEMORAL SPONT: NORMAL
BH CV LOWER VASCULAR LEFT PERONEAL COMPRESS: NORMAL
BH CV LOWER VASCULAR LEFT POPLITEAL AUGMENT: NORMAL
BH CV LOWER VASCULAR LEFT POPLITEAL COMPETENT: NORMAL
BH CV LOWER VASCULAR LEFT POPLITEAL COMPRESS: NORMAL
BH CV LOWER VASCULAR LEFT POPLITEAL PHASIC: NORMAL
BH CV LOWER VASCULAR LEFT POPLITEAL SPONT: NORMAL
BH CV LOWER VASCULAR LEFT POSTERIOR TIBIAL COMPRESS: NORMAL
BH CV LOWER VASCULAR LEFT PROXIMAL FEMORAL COMPRESS: NORMAL
BH CV LOWER VASCULAR LEFT SOLEAL COMPRESS: NORMAL
BH CV LOWER VASCULAR RIGHT COMMON FEMORAL AUGMENT: NORMAL
BH CV LOWER VASCULAR RIGHT COMMON FEMORAL COMPETENT: NORMAL
BH CV LOWER VASCULAR RIGHT COMMON FEMORAL COMPRESS: NORMAL
BH CV LOWER VASCULAR RIGHT COMMON FEMORAL PHASIC: NORMAL
BH CV LOWER VASCULAR RIGHT COMMON FEMORAL SPONT: NORMAL
BH CV LOWER VASCULAR RIGHT DISTAL FEMORAL COMPRESS: NORMAL
BH CV LOWER VASCULAR RIGHT GASTRONEMIUS COMPRESS: NORMAL
BH CV LOWER VASCULAR RIGHT GREATER SAPH AK COMPRESS: NORMAL
BH CV LOWER VASCULAR RIGHT GREATER SAPH BK COMPRESS: NORMAL
BH CV LOWER VASCULAR RIGHT MID FEMORAL AUGMENT: NORMAL
BH CV LOWER VASCULAR RIGHT MID FEMORAL COMPETENT: NORMAL
BH CV LOWER VASCULAR RIGHT MID FEMORAL COMPRESS: NORMAL
BH CV LOWER VASCULAR RIGHT MID FEMORAL PHASIC: NORMAL
BH CV LOWER VASCULAR RIGHT MID FEMORAL SPONT: NORMAL
BH CV LOWER VASCULAR RIGHT PERONEAL COMPRESS: NORMAL
BH CV LOWER VASCULAR RIGHT POPLITEAL AUGMENT: NORMAL
BH CV LOWER VASCULAR RIGHT POPLITEAL COMPETENT: NORMAL
BH CV LOWER VASCULAR RIGHT POPLITEAL COMPRESS: NORMAL
BH CV LOWER VASCULAR RIGHT POPLITEAL PHASIC: NORMAL
BH CV LOWER VASCULAR RIGHT POPLITEAL SPONT: NORMAL
BH CV LOWER VASCULAR RIGHT POSTERIOR TIBIAL COMPRESS: NORMAL
BH CV LOWER VASCULAR RIGHT PROXIMAL FEMORAL COMPRESS: NORMAL
BH CV LOWER VASCULAR RIGHT SOLEAL COMPRESS: NORMAL
MAXIMAL PREDICTED HEART RATE: 145 BPM
STRESS TARGET HR: 123 BPM

## 2022-10-04 ENCOUNTER — APPOINTMENT (OUTPATIENT)
Dept: CARDIOLOGY | Facility: HOSPITAL | Age: 77
End: 2022-10-04

## 2022-10-05 ENCOUNTER — TELEPHONE (OUTPATIENT)
Dept: CARDIOLOGY | Facility: CLINIC | Age: 77
End: 2022-10-05

## 2022-10-05 NOTE — TELEPHONE ENCOUNTER
----- Message from Meera Parker MA sent at 10/5/2022 12:18 PM EDT -----    ----- Message -----  From: Amos Vazquez MD  Sent: 10/3/2022   2:19 PM EDT  To: Tomeka Wilkerson    Inform pt. Of result.

## 2022-12-13 ENCOUNTER — LAB (OUTPATIENT)
Dept: ONCOLOGY | Facility: HOSPITAL | Age: 77
End: 2022-12-13
Payer: MEDICARE

## 2022-12-13 ENCOUNTER — OFFICE VISIT (OUTPATIENT)
Dept: ONCOLOGY | Facility: HOSPITAL | Age: 77
End: 2022-12-13
Payer: MEDICARE

## 2022-12-13 VITALS
WEIGHT: 197.97 LBS | RESPIRATION RATE: 18 BRPM | DIASTOLIC BLOOD PRESSURE: 72 MMHG | HEART RATE: 73 BPM | SYSTOLIC BLOOD PRESSURE: 120 MMHG | TEMPERATURE: 97.8 F | OXYGEN SATURATION: 98 % | BODY MASS INDEX: 32.94 KG/M2

## 2022-12-13 DIAGNOSIS — Z12.31 ENCOUNTER FOR SCREENING MAMMOGRAM FOR MALIGNANT NEOPLASM OF BREAST: ICD-10-CM

## 2022-12-13 DIAGNOSIS — C50.919 MALIGNANT NEOPLASM OF FEMALE BREAST, UNSPECIFIED ESTROGEN RECEPTOR STATUS, UNSPECIFIED LATERALITY, UNSPECIFIED SITE OF BREAST: Primary | ICD-10-CM

## 2022-12-13 PROCEDURE — 99214 OFFICE O/P EST MOD 30 MIN: CPT | Performed by: INTERNAL MEDICINE

## 2022-12-13 PROCEDURE — G0463 HOSPITAL OUTPT CLINIC VISIT: HCPCS | Performed by: INTERNAL MEDICINE

## 2022-12-13 RX ORDER — BENZONATATE 200 MG/1
CAPSULE ORAL
COMMUNITY

## 2022-12-13 RX ORDER — ALLOPURINOL 100 MG/1
TABLET ORAL
COMMUNITY

## 2022-12-13 NOTE — PROGRESS NOTES
Patient  Yareli Phillips    [facility] HEMATOLOGY & ONCOLOGY    Chief Complaint  Breast Cancer    Referring Provider: KISHAN Hyatt  PCP: Evangelina Bajwa APRN    Subjective          Oncology/Hematology History Overview Note     ER+/IL+/HER2+ Breast Cancer:            The breast mass was initially noted on screening mammogram.  Diagnostic     mammogram and ultrasound confirmed a 2.7 x 2.5 cm mass at the 3 o'clock     position.  She underwent biopsy on 4/6/2020.  Pathology was positive for     invasive ductal carcinoma, estrogen receptor positive (75%, moderate),     progesterone receptor positive (40%, moderate) HER-2 equivocal (2+ by IHC), HER-    2 FISH positive, Ki-67 55%.            ECHO Global Hypokinesis 45-50%            Cycle 1 of neoadjuvant TCH on 4/30/2020 -AUC of 4      Cycle 2 of TCH on 5/22/20 (60mg/kg Taxol)       Cycle 3 on 6/12/2020      Cycle 4 on 7/2/2020 - further chemotherapy was halted due to patient     intolerance.            Left breast lumpectomy 8/18/2020: Patient had a residual 15 mm tumor, grade 2     with no associated DCIS.  Lymph vascular invasion was present.  Margins are     negative.  4 lymph nodes were examined and all 4 were found to be neck.  Final     stage [id number].            Since the patient had residual disease on lumpectomy her treatment was switched     to Kadcyla which she will give her 1 year.  First cycle on 10/20/2020.  Decrease    the dose of Kadcyla after her first cycle due to nausea, fatigue, and decreased     appetite.  Cycle 2 on 11/10/2020.            After 2 cycles of Kadcyla patient was transitioned back 2 Herceptin due to     cytopenias. C7 with Herceptin on 12/1/20. Transitioned back to Kadcyla at a     reduced dose after developing LE edema of Herceptin but developed further     swelling and stopped anticancer therapy entirely.            Started anastrozole in January 2021         Malignant tumor of breast (HCC)    5/8/2020 Initial Diagnosis    Malignant tumor of breast (CMS/HCC)         HPI  Patient comes in today for 6-month follow-up while on anastrozole.  She is having some episodes of shortness of breath with activity.  She noticed this while decorating her [place] tree.  She was seeing Dr. Vazquez in cardiology but will be getting a new doctor soon because he is moving into more administration.  She also has knee pain due to her history of arthritis and gout.  She gets shots in her knee which helped improve the pain.  She has lost about 12 pounds intentionally by watching her diet.  She gets labs through her PCP including CBC and CMP.  I reviewed these and they are essentially normal.    Review of Systems   Constitutional: Positive for fatigue. Negative for appetite change, diaphoresis, fever, unexpected weight gain and unexpected weight loss.   HENT: Negative for hearing loss, mouth sores, sore throat, swollen glands, trouble swallowing and voice change.    Eyes: Negative for blurred vision.   Respiratory: Negative for cough, shortness of breath and wheezing.    Cardiovascular: Negative for chest pain and palpitations.   Gastrointestinal: Negative for abdominal pain, blood in stool, constipation, diarrhea, nausea and vomiting.   Endocrine: Negative for cold intolerance and heat intolerance.   Genitourinary: Negative for difficulty urinating, dysuria, frequency, hematuria and urinary incontinence.   Musculoskeletal: Negative for arthralgias, back pain and myalgias.   Skin: Negative for rash, skin lesions and wound.   Neurological: Negative for dizziness, seizures, weakness, numbness and headache.   Hematological: Does not bruise/bleed easily.   Psychiatric/Behavioral: Negative for depressed mood. The patient is not nervous/anxious.        Past Medical History:   Diagnosis Date   • Atrial fibrillation (HCC)    • Breast cancer (HCC)    • CHF (congestive heart failure) (HCC)    • Hypertension    • Presence of cardiac  pacemaker 2/7/2020     Past Surgical History:   Procedure Laterality Date   • BREAST LUMPECTOMY     • CYST REMOVAL     • PORTACATH PLACEMENT       Social History     Socioeconomic History   • Marital status:    Tobacco Use   • Smoking status: Never   • Smokeless tobacco: Never   Vaping Use   • Vaping Use: Never used   Substance and Sexual Activity   • Alcohol use: Never   • Drug use: Never   • Sexual activity: Defer     Family History   Problem Relation Age of Onset   • Breast cancer Mother    • Bone cancer Mother    • Bone cancer Father        Objective   Physical Exam  General: Alert, cooperative, no acute distress  Eyes: Anicteric sclera, PERRLA  Respiratory: normal respiratory effort  Cardiovascular: no lower extremity edema  Skin: Normal tone, no rash, no lesions  Psychiatric: Appropriate affect, intact judgment  Neurologic: No focal sensory or motor deficits, normal cognition   Musculoskeletal: Normal muscle strength and tone  Extremities: No clubbing, cyanosis, or deformities    Vitals:    12/13/22 1006   BP: 120/72   Pulse: 73   Resp: 18   Temp: 97.8 °F (36.6 °C)   SpO2: 98%   Weight: 89.8 kg (197 lb 15.6 oz)   PainSc:   8   PainLoc: Knee     ECOG score: 1         PHQ-9 Total Score:         Result Review :   The following data was reviewed by: Jocelyn Camp MD PhD on 12/13/2022:  Lab Results   Component Value Date    HGB 12.0 07/21/2022    HCT 36.7 07/21/2022    .4 (H) 07/21/2022     (L) 07/21/2022    WBC 2.16 (L) 07/21/2022    NEUTROABS 1.22 (L) 07/21/2022    LYMPHSABS 0.62 (L) 07/21/2022    MONOSABS 0.24 07/21/2022    EOSABS 0.07 07/21/2022    BASOSABS 0.01 07/21/2022     Lab Results   Component Value Date    GLUCOSE 91 07/21/2022    BUN 24 (H) 07/21/2022    CREATININE 0.99 07/21/2022     07/21/2022    K 4.9 07/21/2022     07/21/2022    CO2 29.3 (H) 07/21/2022    CALCIUM 9.9 07/21/2022    PROTEINTOT 6.4 07/21/2022    ALBUMIN 4.00 07/21/2022    BILITOT 0.6 07/21/2022     ALKPHOS 99 07/21/2022    AST 26 07/21/2022    ALT 19 07/21/2022          Assessment and Plan    Diagnoses and all orders for this visit:    1. Malignant neoplasm of female breast, unspecified estrogen receptor status, unspecified laterality, unspecified site of breast (HCC) (Primary)    2. Encounter for screening mammogram for malignant neoplasm of breast  -     Mammo screening digital tomosynthesis bilateral w CAD; Future      ER+/HER2+ Breast Cancer: The patient completed chemotherapy with herceptin and is now on adjuvant endocrine therapy with anastrozole. She is tolerating it well and will continue this for a total of 5 years.  I will plan for her next mammogram in July and f/u after.  Her DEXA scan is not due until 2024.     LARISSA: The pt will continue oral iron.  I will add iron studies to her repeat labs which will be done prior to her next f/u appt.       Patient was given instructions and counseling regarding her condition or for health maintenance advice. Please see specific information pulled into the AVS if appropriate.

## 2023-02-27 DIAGNOSIS — I48.20 CHRONIC ATRIAL FIBRILLATION: ICD-10-CM

## 2023-02-27 RX ORDER — CARVEDILOL 25 MG/1
25 TABLET ORAL EVERY 12 HOURS SCHEDULED
Qty: 180 TABLET | Refills: 3 | Status: SHIPPED | OUTPATIENT
Start: 2023-02-27

## 2023-03-08 DIAGNOSIS — I50.42 CHRONIC COMBINED SYSTOLIC AND DIASTOLIC CONGESTIVE HEART FAILURE: ICD-10-CM

## 2023-03-08 RX ORDER — SPIRONOLACTONE 25 MG/1
25 TABLET ORAL EVERY 12 HOURS SCHEDULED
Qty: 180 TABLET | Refills: 3 | Status: SHIPPED | OUTPATIENT
Start: 2023-03-08

## 2023-03-27 ENCOUNTER — OFFICE VISIT (OUTPATIENT)
Dept: CARDIOLOGY | Facility: CLINIC | Age: 78
End: 2023-03-27
Payer: MEDICARE

## 2023-03-27 VITALS
BODY MASS INDEX: 34.32 KG/M2 | HEART RATE: 67 BPM | WEIGHT: 206 LBS | HEIGHT: 65 IN | DIASTOLIC BLOOD PRESSURE: 67 MMHG | SYSTOLIC BLOOD PRESSURE: 104 MMHG

## 2023-03-27 DIAGNOSIS — I48.20 CHRONIC ATRIAL FIBRILLATION: Primary | ICD-10-CM

## 2023-03-27 DIAGNOSIS — I50.42 CHRONIC COMBINED SYSTOLIC AND DIASTOLIC CONGESTIVE HEART FAILURE: ICD-10-CM

## 2023-03-27 DIAGNOSIS — I10 PRIMARY HYPERTENSION: ICD-10-CM

## 2023-03-27 PROCEDURE — 3074F SYST BP LT 130 MM HG: CPT | Performed by: INTERNAL MEDICINE

## 2023-03-27 PROCEDURE — 3078F DIAST BP <80 MM HG: CPT | Performed by: INTERNAL MEDICINE

## 2023-03-27 PROCEDURE — 99214 OFFICE O/P EST MOD 30 MIN: CPT | Performed by: INTERNAL MEDICINE

## 2023-03-27 NOTE — PROGRESS NOTES
Chief Complaint  Hypertension and Congestive Heart Failure    Subjective        Yareli Phillips presents to [facility] CARDIOLOGY  History of present illness:    Patient states overall from heart standpoint she is doing pretty well.  She denies any palpitations or bleeding problems.  She was taken off the Lasix by her primary care physician when she got very dehydrated.  She had 1 episode about 2 weeks ago where she was short of breath and she took the Lasix, urinated a lot, and the shortness of breath resolved.  She has not had it since.      Past Medical History:   Diagnosis Date   • Atrial fibrillation (HCC)    • Breast cancer (HCC)    • CHF (congestive heart failure) (HCC)    • Hypertension    • Presence of cardiac pacemaker 2/7/2020         Past Surgical History:   Procedure Laterality Date   • BREAST LUMPECTOMY     • CYST REMOVAL     • PORTACATH PLACEMENT            Social History     Socioeconomic History   • Marital status:    Tobacco Use   • Smoking status: Never   • Smokeless tobacco: Never   Vaping Use   • Vaping Use: Never used   Substance and Sexual Activity   • Alcohol use: Never   • Drug use: Never   • Sexual activity: Defer         Family History   Problem Relation Age of Onset   • Breast cancer Mother    • Bone cancer Mother    • Bone cancer Father           Allergies   Allergen Reactions   • Morphine Nausea And Vomiting   • Meperidine Rash            Current Outpatient Medications:   •  acetaminophen (TYLENOL) 500 MG tablet, Take 1 tablet by mouth Every 6 (Six) Hours As Needed for Mild Pain., Disp: , Rfl:   •  allopurinol (ZYLOPRIM) 100 MG tablet, allopurinol 100 mg tablet  Take 1 tablet every day by oral route., Disp: , Rfl:   •  anastrozole (ARIMIDEX) 1 MG tablet, Take 1 tablet by mouth Daily., Disp: 90 tablet, Rfl: 3  •  apixaban (Eliquis) 5 MG tablet tablet, Take 1 tablet by mouth 2 (Two) Times a Day., Disp: 180 tablet, Rfl: 3  •  benzonatate (TESSALON) 200 MG capsule,  "benzonatate 200 mg capsule  Take 1 capsule 3 times a day by oral route as needed.  for cough, Disp: , Rfl:   •  carvedilol (COREG) 25 MG tablet, Take 1 tablet by mouth Every 12 (Twelve) Hours., Disp: 180 tablet, Rfl: 3  •  ferrous gluconate (FERGON) 324 MG tablet, TAKE 1 TABLET BY MOUTH DAILY WITH BREAKFAST, Disp: 30 tablet, Rfl: 5  •  gabapentin (NEURONTIN) 300 MG capsule, Take 1 capsule by mouth 2 (two) times a day for 90 days., Disp: 180 capsule, Rfl: 2  •  losartan (COZAAR) 25 MG tablet, TAKE ONE-HALF TABLET BY MOUTH EVERY NIGHT AT BEDTIME, Disp: 45 tablet, Rfl: 3  •  silver sulfadiazine (SILVADENE, SSD) 1 % cream, silver sulfadiazine 1 % topical cream  APPLY TO AFFECTED AREA BID, Disp: , Rfl:   •  spironolactone (ALDACTONE) 25 MG tablet, Take 1 tablet by mouth Every 12 (Twelve) Hours., Disp: 180 tablet, Rfl: 3  •  traMADol (ULTRAM) 50 MG tablet, Take 1 tablet by mouth 2 (two) times a day., Disp: , Rfl:       ROS:  Cardiac review of systems negative.    Objective     /67   Pulse 67   Ht 165.1 cm (65\")   Wt 93.4 kg (206 lb)   BMI 34.28 kg/m²       General Appearance:   · well developed  · well nourished  HENT:   · oropharynx moist  · lips not cyanotic  Respiratory:  · no respiratory distress  · normal breath sounds  · no rales  Cardiovascular:  · no jugular venous distention  · regular rhythm  · S1 normal, S2 normal  · no S3, no S4   · no murmur  · no rub, no thrill  · No carotid bruit  · pedal pulses normal  · lower extremity edema: none    Musculoskeletal:  · no clubbing of fingers.   · normocephalic, head atraumatic  Skin:   · warm, dry  Psychiatric:  · judgement and insight appropriate  · normal mood and affect    ECHO:    STRESS:    CATH:  No results found for this or any previous visit.    BMP:   Glucose   Date Value Ref Range Status   07/21/2022 91 65 - 99 mg/dL Final     BUN   Date Value Ref Range Status   07/21/2022 24 (H) 8 - 23 mg/dL Final     Creatinine   Date Value Ref Range Status "   07/21/2022 0.99 0.57 - 1.00 mg/dL Final     Sodium   Date Value Ref Range Status   07/21/2022 137 136 - 145 mmol/L Final     Potassium   Date Value Ref Range Status   07/21/2022 4.9 3.5 - 5.2 mmol/L Final     Chloride   Date Value Ref Range Status   07/21/2022 104 98 - 107 mmol/L Final     CO2   Date Value Ref Range Status   07/21/2022 29.3 (H) 22.0 - 29.0 mmol/L Final     Calcium   Date Value Ref Range Status   07/21/2022 9.9 8.6 - 10.5 mg/dL Final     BUN/Creatinine Ratio   Date Value Ref Range Status   07/21/2022 24.2 7.0 - 25.0 Final     Anion Gap   Date Value Ref Range Status   07/21/2022 3.7 (L) 5.0 - 15.0 mmol/L Final     eGFR   Date Value Ref Range Status   07/21/2022 59.6 (L) >60.0 mL/min/1.73 Final     Comment:     National Kidney Foundation and American Society of Nephrology (ASN) Task Force recommended calculation based on the Chronic Kidney Disease Epidemiology Collaboration (CKD-EPI) equation refit without adjustment for race.     LIPIDS:  No results found for: CHOL, TRIG, HDL, LDL, VLDL, LDLHDL      Procedures             ASSESSMENT:  Encounter Diagnoses   Name Primary?   • Chronic atrial fibrillation (HCC) Yes   • Chronic combined systolic and diastolic congestive heart failure (HCC)    • Primary hypertension          PLAN:    1.  Continue the Eliquis.  The patient notes no problems with bleeding.  I did give her samples today and she is going to call in every once in a while to  samples.  If we cannot keep her in samples then we would have to switch her to Xarelto.  2.  Blood pressure and heart rate are under good control.  3.  Last echocardiogram 5/18/2020 showed an EF of 45 to 50% with mild left ventricular and biatrial enlargement.  She had mild mitral and tricuspid regurgitation.  4.  Told patient she could use the Lasix as needed for either shortness of breath or edema.  She was taken off the daily Lasix due to apparent azotemia.  5.  Patient appears overall to be doing well from a  heart standpoint.          Patient was given instructions and counseling regarding her condition or for health maintenance advice. Please see specific information pulled into the AVS if appropriate.         Cuong Suero MD   3/27/2023  15:43 EDT

## 2023-04-03 DIAGNOSIS — D50.8 OTHER IRON DEFICIENCY ANEMIA: ICD-10-CM

## 2023-04-03 RX ORDER — DOXYCYCLINE HYCLATE 50 MG/1
324 CAPSULE, GELATIN COATED ORAL
Qty: 30 TABLET | Refills: 5 | Status: SHIPPED | OUTPATIENT
Start: 2023-04-03

## 2023-04-13 RX ORDER — LOSARTAN POTASSIUM 25 MG/1
TABLET ORAL
Qty: 90 TABLET | Refills: 3 | Status: SHIPPED | OUTPATIENT
Start: 2023-04-13

## 2023-05-30 ENCOUNTER — TELEPHONE (OUTPATIENT)
Dept: CARDIOLOGY | Facility: CLINIC | Age: 78
End: 2023-05-30

## 2023-05-30 NOTE — TELEPHONE ENCOUNTER
Caller: Yareli Phillips    Relationship: Self    Best call back number: [phone]    What is the best time to reach you: ANYTIME    Who are you requesting to speak with (clinical staff, provider,  specific staff member): SONIA    Do you know the name of the person who called:     What was the call regarding: PT NEEDS ELIQUIS SAMPLES - ONLY HAS ABOUT 7 PILLS LEFT    Is it okay if the provider responds through MyChart: NO, CALL.

## 2023-08-01 ENCOUNTER — OFFICE VISIT (OUTPATIENT)
Dept: ONCOLOGY | Facility: HOSPITAL | Age: 78
End: 2023-08-01
Payer: MEDICARE

## 2023-08-01 ENCOUNTER — LAB (OUTPATIENT)
Dept: ONCOLOGY | Facility: HOSPITAL | Age: 78
End: 2023-08-01
Payer: MEDICARE

## 2023-08-01 VITALS
OXYGEN SATURATION: 99 % | HEART RATE: 78 BPM | WEIGHT: 203.04 LBS | TEMPERATURE: 97.8 F | BODY MASS INDEX: 33.83 KG/M2 | RESPIRATION RATE: 18 BRPM | DIASTOLIC BLOOD PRESSURE: 68 MMHG | HEIGHT: 65 IN | SYSTOLIC BLOOD PRESSURE: 126 MMHG

## 2023-08-01 DIAGNOSIS — C50.919 MALIGNANT NEOPLASM OF FEMALE BREAST, UNSPECIFIED ESTROGEN RECEPTOR STATUS, UNSPECIFIED LATERALITY, UNSPECIFIED SITE OF BREAST: Primary | ICD-10-CM

## 2023-08-01 DIAGNOSIS — D50.8 OTHER IRON DEFICIENCY ANEMIA: ICD-10-CM

## 2023-08-01 PROBLEM — M10.9 GOUT: Status: ACTIVE | Noted: 2022-09-27

## 2023-08-01 LAB
ALBUMIN SERPL-MCNC: 4.1 G/DL (ref 3.5–5.2)
ALBUMIN/GLOB SERPL: 1.6 G/DL
ALP SERPL-CCNC: 74 U/L (ref 39–117)
ALT SERPL W P-5'-P-CCNC: 17 U/L (ref 1–33)
ANION GAP SERPL CALCULATED.3IONS-SCNC: 9.6 MMOL/L (ref 5–15)
AST SERPL-CCNC: 23 U/L (ref 1–32)
BASOPHILS # BLD AUTO: 0.02 10*3/MM3 (ref 0–0.2)
BASOPHILS NFR BLD AUTO: 0.5 % (ref 0–1.5)
BILIRUB SERPL-MCNC: 0.7 MG/DL (ref 0–1.2)
BUN SERPL-MCNC: 20 MG/DL (ref 8–23)
BUN/CREAT SERPL: 18.2 (ref 7–25)
CALCIUM SPEC-SCNC: 10.4 MG/DL (ref 8.6–10.5)
CHLORIDE SERPL-SCNC: 102 MMOL/L (ref 98–107)
CO2 SERPL-SCNC: 27.4 MMOL/L (ref 22–29)
CREAT SERPL-MCNC: 1.1 MG/DL (ref 0.57–1)
DEPRECATED RDW RBC AUTO: 51.3 FL (ref 37–54)
EGFRCR SERPLBLD CKD-EPI 2021: 52.2 ML/MIN/1.73
EOSINOPHIL # BLD AUTO: 0.06 10*3/MM3 (ref 0–0.4)
EOSINOPHIL NFR BLD AUTO: 1.5 % (ref 0.3–6.2)
ERYTHROCYTE [DISTWIDTH] IN BLOOD BY AUTOMATED COUNT: 13.3 % (ref 12.3–15.4)
FERRITIN SERPL-MCNC: 468.7 NG/ML (ref 13–150)
GLOBULIN UR ELPH-MCNC: 2.5 GM/DL
GLUCOSE SERPL-MCNC: 87 MG/DL (ref 65–99)
HCT VFR BLD AUTO: 38.1 % (ref 34–46.6)
HGB BLD-MCNC: 12.3 G/DL (ref 12–15.9)
IMM GRANULOCYTES # BLD AUTO: 0.02 10*3/MM3 (ref 0–0.05)
IMM GRANULOCYTES NFR BLD AUTO: 0.5 % (ref 0–0.5)
IRON 24H UR-MRATE: 152 MCG/DL (ref 37–145)
IRON SATN MFR SERPL: 43 % (ref 20–50)
LYMPHOCYTES # BLD AUTO: 0.97 10*3/MM3 (ref 0.7–3.1)
LYMPHOCYTES NFR BLD AUTO: 24.8 % (ref 19.6–45.3)
MCH RBC QN AUTO: 33.6 PG (ref 26.6–33)
MCHC RBC AUTO-ENTMCNC: 32.3 G/DL (ref 31.5–35.7)
MCV RBC AUTO: 104.1 FL (ref 79–97)
MONOCYTES # BLD AUTO: 0.35 10*3/MM3 (ref 0.1–0.9)
MONOCYTES NFR BLD AUTO: 9 % (ref 5–12)
NEUTROPHILS NFR BLD AUTO: 2.49 10*3/MM3 (ref 1.7–7)
NEUTROPHILS NFR BLD AUTO: 63.7 % (ref 42.7–76)
NRBC BLD AUTO-RTO: 0 /100 WBC (ref 0–0.2)
PLATELET # BLD AUTO: 124 10*3/MM3 (ref 140–450)
PMV BLD AUTO: 10.1 FL (ref 6–12)
POTASSIUM SERPL-SCNC: 5.1 MMOL/L (ref 3.5–5.2)
PROT SERPL-MCNC: 6.6 G/DL (ref 6–8.5)
RBC # BLD AUTO: 3.66 10*6/MM3 (ref 3.77–5.28)
SODIUM SERPL-SCNC: 139 MMOL/L (ref 136–145)
TIBC SERPL-MCNC: 358 MCG/DL (ref 298–536)
TRANSFERRIN SERPL-MCNC: 240 MG/DL (ref 200–360)
WBC NRBC COR # BLD: 3.91 10*3/MM3 (ref 3.4–10.8)

## 2023-08-01 PROCEDURE — 36415 COLL VENOUS BLD VENIPUNCTURE: CPT

## 2023-08-01 PROCEDURE — 80053 COMPREHEN METABOLIC PANEL: CPT

## 2023-08-01 PROCEDURE — 82728 ASSAY OF FERRITIN: CPT

## 2023-08-01 PROCEDURE — G0463 HOSPITAL OUTPT CLINIC VISIT: HCPCS | Performed by: INTERNAL MEDICINE

## 2023-08-01 PROCEDURE — 83540 ASSAY OF IRON: CPT

## 2023-08-01 PROCEDURE — 84466 ASSAY OF TRANSFERRIN: CPT

## 2023-08-01 PROCEDURE — 85025 COMPLETE CBC W/AUTO DIFF WBC: CPT

## 2023-08-01 RX ORDER — ANASTROZOLE 1 MG/1
1 TABLET ORAL DAILY
Qty: 90 TABLET | Refills: 3 | Status: SHIPPED | OUTPATIENT
Start: 2023-08-01

## 2023-08-01 NOTE — PROGRESS NOTES
Patient  Yareli Phillips    [facility] HEMATOLOGY & ONCOLOGY    Chief Complaint  Malignant neoplasm of female breast, unspecified estrogen r    Referring Provider: KISHAN Hyatt  PCP: Evangelina Bajwa APRN    Subjective          Oncology/Hematology History Overview Note     ER+/WA+/HER2+ Breast Cancer:            The breast mass was initially noted on screening mammogram.  Diagnostic     mammogram and ultrasound confirmed a 2.7 x 2.5 cm mass at the 3 o'clock     position.  She underwent biopsy on 4/6/2020.  Pathology was positive for     invasive ductal carcinoma, estrogen receptor positive (75%, moderate),     progesterone receptor positive (40%, moderate) HER-2 equivocal (2+ by IHC), HER-    2 FISH positive, Ki-67 55%.            ECHO Global Hypokinesis 45-50%            Cycle 1 of neoadjuvant TCH on 4/30/2020 -AUC of 4      Cycle 2 of TCH on 5/22/20 (60mg/kg Taxol)       Cycle 3 on 6/12/2020      Cycle 4 on 7/2/2020 - further chemotherapy was halted due to patient     intolerance.            Left breast lumpectomy 8/18/2020: Patient had a residual 15 mm tumor, grade 2     with no associated DCIS.  Lymph vascular invasion was present.  Margins are     negative.  4 lymph nodes were examined and all 4 were found to be neck.  Final     stage [id number].            Since the patient had residual disease on lumpectomy her treatment was switched     to Kadcyla which she will give her 1 year.  First cycle on 10/20/2020.  Decrease    the dose of Kadcyla after her first cycle due to nausea, fatigue, and decreased     appetite.  Cycle 2 on 11/10/2020.            After 2 cycles of Kadcyla patient was transitioned back 2 Herceptin due to     cytopenias. C7 with Herceptin on 12/1/20. Transitioned back to Kadcyla at a     reduced dose after developing LE edema of Herceptin but developed further     swelling and stopped anticancer therapy entirely.            Started anastrozole in January  2021         Malignant tumor of breast   5/8/2020 Initial Diagnosis    Malignant tumor of breast (CMS/HCC)         History of Present Illness  Patient comes in today for follow-up while on anastrozole.  She recently had a mammogram on 7/25/2023 which was negative.  She brought up the fact that her alkaline phosphatase has been mildly elevated.  She follows with her primary care provider who made note of it but was not concerned.  We also discussed the fact that the patient is on oral iron supplementation.  She is tolerating it well.    Review of Systems   Constitutional:  Positive for fatigue (5/10). Negative for appetite change, diaphoresis, fever, unexpected weight gain and unexpected weight loss.   HENT:  Negative for hearing loss, mouth sores, sore throat, swollen glands, trouble swallowing and voice change.    Eyes:  Negative for blurred vision.   Respiratory:  Negative for cough, shortness of breath and wheezing.    Cardiovascular:  Negative for chest pain and palpitations.   Gastrointestinal:  Negative for abdominal pain, blood in stool, constipation, diarrhea, nausea and vomiting.   Endocrine: Negative for cold intolerance and heat intolerance.   Genitourinary:  Negative for difficulty urinating, dysuria, frequency, hematuria and urinary incontinence.   Musculoskeletal:  Negative for arthralgias, back pain and myalgias.   Skin:  Negative for rash, skin lesions and wound.   Neurological:  Positive for numbness (Neuropathy). Negative for dizziness, seizures, weakness and headache.   Hematological:  Does not bruise/bleed easily.   Psychiatric/Behavioral:  Negative for depressed mood. The patient is not nervous/anxious.    All other systems reviewed and are negative.    Past Medical History:   Diagnosis Date    Atrial fibrillation     Breast cancer     CHF (congestive heart failure)     Hypertension     Presence of cardiac pacemaker 2/7/2020     Past Surgical History:   Procedure Laterality Date    BREAST  "LUMPECTOMY      CYST REMOVAL      PORTACATH PLACEMENT       Social History     Socioeconomic History    Marital status:    Tobacco Use    Smoking status: Never    Smokeless tobacco: Never   Vaping Use    Vaping Use: Never used   Substance and Sexual Activity    Alcohol use: Never    Drug use: Never    Sexual activity: Defer     Family History   Problem Relation Age of Onset    Breast cancer Mother     Bone cancer Mother     Bone cancer Father        Objective   Physical Exam  General: Alert, cooperative, no acute distress  Eyes: Anicteric sclera, PERRLA  Respiratory: normal respiratory effort  Cardiovascular: no lower extremity edema  Skin: Normal tone, no rash, no lesions  Psychiatric: Appropriate affect, intact judgment  Neurologic: No focal sensory or motor deficits, normal cognition   Musculoskeletal: Normal muscle strength and tone  Extremities: No clubbing, cyanosis, or deformities    Vitals:    08/01/23 1313   BP: 126/68   Pulse: 78   Resp: 18   Temp: 97.8 °F (36.6 °C)   SpO2: 99%   Weight: 92.1 kg (203 lb 0.7 oz)   Height: 165.1 cm (65\")   PainSc:   5   PainLoc: Generalized  Comment: Neuropathy     ECOG score: 1         PHQ-9 Total Score:         Result Review :   The following data was reviewed by: Jocelyn Camp MD PhD on 08/01/2023:  Lab Results   Component Value Date    HGB 12.3 08/01/2023    HCT 38.1 08/01/2023    .1 (H) 08/01/2023     (L) 08/01/2023    WBC 3.91 08/01/2023    NEUTROABS 2.49 08/01/2023    LYMPHSABS 0.97 08/01/2023    MONOSABS 0.35 08/01/2023    EOSABS 0.06 08/01/2023    BASOSABS 0.02 08/01/2023     Lab Results   Component Value Date    GLUCOSE 87 08/01/2023    BUN 20 08/01/2023    CREATININE 1.10 (H) 08/01/2023     08/01/2023    K 5.1 08/01/2023     08/01/2023    CO2 27.4 08/01/2023    CALCIUM 10.4 08/01/2023    PROTEINTOT 6.6 08/01/2023    ALBUMIN 4.1 08/01/2023    BILITOT 0.7 08/01/2023    ALKPHOS 74 08/01/2023    AST 23 08/01/2023    ALT 17 " 08/01/2023     Lab Results   Component Value Date    IRON 152 (H) 08/01/2023    LABIRON 43 08/01/2023    TRANSFERRIN 240 08/01/2023    TIBC 358 08/01/2023     Lab Results   Component Value Date    FERRITIN 468.70 (H) 08/01/2023    [id number] 332 07/21/2022    FOLATE 16.90 07/21/2022     No results found for: PSA, CEA, AFP, ,        Assessment and Plan    Diagnoses and all orders for this visit:    1. Malignant neoplasm of female breast, unspecified estrogen receptor status, unspecified laterality, unspecified site of breast (Primary)    2. Other iron deficiency anemia  -     CBC & Differential; Future  -     Comprehensive Metabolic Panel; Future  -     Ferritin; Future  -     Iron Profile; Future    Other orders  -     anastrozole (ARIMIDEX) 1 MG tablet; Take 1 tablet by mouth Daily.  Dispense: 90 tablet; Refill: 3        ER+/HER2+ Breast Cancer: The patient completed chemotherapy with herceptin and is now on adjuvant endocrine therapy with anastrozole. She is tolerating it well and will continue this for a total of 5 years.  Screening mammogram was negative.  The patient will follow-up in 6 months.  Her DEXA scan is not due until 2024.     LARISSA: The pt will continue oral iron.  I will repeat labs when she comes for follow-up in 6 months including CBC, CMP, iron panel, and ferritin.    Patient was given instructions and counseling regarding her condition or for health maintenance advice. Please see specific information pulled into the AVS if appropriate.     Jocelyn Camp MD PhD    9/13/2023

## 2023-08-17 ENCOUNTER — TELEPHONE (OUTPATIENT)
Dept: ONCOLOGY | Facility: HOSPITAL | Age: 78
End: 2023-08-17
Payer: MEDICARE

## 2023-08-17 ENCOUNTER — TELEPHONE (OUTPATIENT)
Dept: CARDIOLOGY | Facility: CLINIC | Age: 78
End: 2023-08-17
Payer: MEDICARE

## 2023-08-17 NOTE — TELEPHONE ENCOUNTER
Caller: Yareli Phillips    Relationship: Self    Best call back number: [phone]    What is the best time to reach you: ANY    Who are you requesting to speak with (clinical staff, provider,  specific staff member): CLINICAL        What was the call regarding: PATIENT CALLED TO GO OVER LAB RESULTS    Is it okay if the provider responds through MyChart: NO

## 2023-08-17 NOTE — TELEPHONE ENCOUNTER
Caller: Yareli Phillips    Relationship: Self    Best call back number: [phone]    What medications are you currently taking:   Current Outpatient Medications on File Prior to Visit   Medication Sig Dispense Refill    acetaminophen (TYLENOL) 500 MG tablet Take 1 tablet by mouth Every 6 (Six) Hours As Needed for Mild Pain.      allopurinol (ZYLOPRIM) 100 MG tablet allopurinol 100 mg tablet   Take 1 tablet every day by oral route.      anastrozole (ARIMIDEX) 1 MG tablet Take 1 tablet by mouth Daily. 90 tablet 3    apixaban (Eliquis) 5 MG tablet tablet Take 1 tablet by mouth 2 (Two) Times a Day. 180 tablet 3    apixaban (ELIQUIS) 5 MG tablet tablet Take 1 tablet by mouth 2 (Two) Times a Day. [id number]  [id number]  4Boxes 112 tablet 0    benzonatate (TESSALON) 200 MG capsule benzonatate 200 mg capsule   Take 1 capsule 3 times a day by oral route as needed.   for cough      carvedilol (COREG) 25 MG tablet Take 1 tablet by mouth Every 12 (Twelve) Hours. 180 tablet 3    ferrous gluconate (FERGON) 324 MG tablet TAKE 1 TABLET BY MOUTH DAILY WITH BREAKFAST 30 tablet 5    gabapentin (NEURONTIN) 300 MG capsule Take 1 capsule by mouth 2 (two) times a day for 90 days. 180 capsule 2    losartan (COZAAR) 25 MG tablet TAKE 1/2 TABLET BY MOUTH EVERY NIGHT AT BEDTIME 90 tablet 3    silver sulfadiazine (SILVADENE, SSD) 1 % cream silver sulfadiazine 1 % topical cream   APPLY TO AFFECTED AREA BID      spironolactone (ALDACTONE) 25 MG tablet Take 1 tablet by mouth Every 12 (Twelve) Hours. 180 tablet 3    traMADol (ULTRAM) 50 MG tablet Take 1 tablet by mouth 2 (two) times a day.       No current facility-administered medications on file prior to visit.          When did you start taking these medications: UNSURE    Which medication are you concerned about: TERRI    Who prescribed you this medication: DR. RICHARDS    What are your concerns: PATIENT IS CALLING DUE TO DR. RICHARDS PUTTING PATIENT ON ELIQUIS. SHE IS NEEDING TO  SAMPLES  IF POSSIBLE. PLEASE ADVISE, THANK YOU.    How long have you had these concerns: 8.17.23

## 2023-10-05 ENCOUNTER — OFFICE VISIT (OUTPATIENT)
Dept: CARDIOLOGY | Facility: CLINIC | Age: 78
End: 2023-10-05
Payer: MEDICARE

## 2023-10-05 VITALS
HEART RATE: 92 BPM | WEIGHT: 200 LBS | DIASTOLIC BLOOD PRESSURE: 71 MMHG | SYSTOLIC BLOOD PRESSURE: 108 MMHG | BODY MASS INDEX: 33.32 KG/M2 | HEIGHT: 65 IN

## 2023-10-05 DIAGNOSIS — I48.11 LONGSTANDING PERSISTENT ATRIAL FIBRILLATION: Primary | ICD-10-CM

## 2023-10-05 DIAGNOSIS — R06.09 DYSPNEA ON EXERTION: ICD-10-CM

## 2023-10-05 DIAGNOSIS — I10 PRIMARY HYPERTENSION: ICD-10-CM

## 2023-10-05 PROCEDURE — 3078F DIAST BP <80 MM HG: CPT | Performed by: INTERNAL MEDICINE

## 2023-10-05 PROCEDURE — 3074F SYST BP LT 130 MM HG: CPT | Performed by: INTERNAL MEDICINE

## 2023-10-05 PROCEDURE — 99213 OFFICE O/P EST LOW 20 MIN: CPT | Performed by: INTERNAL MEDICINE

## 2023-10-05 NOTE — PROGRESS NOTES
Chief Complaint  Atrial Fibrillation and Hypertension    Subjective        Yareli Phillips presents to [facility] CARDIOLOGY  History of present illness:    Patient occasionally notices shortness of breath and weight increase.  She will just take the Lasix for a couple days and she will have significant urine output and her weight will come back to normal.  Also her shortness of breath will resolve.  She then goes off the Lasix.  With some atypical chest pain and left shoulder pain.  This can occur just sitting in a recliner.  She notes no significant bleeding problems.      Past Medical History:   Diagnosis Date    Atrial fibrillation     Breast cancer     CHF (congestive heart failure)     Hypertension     Presence of cardiac pacemaker 2/7/2020         Past Surgical History:   Procedure Laterality Date    BREAST LUMPECTOMY      CYST REMOVAL      PORTACATH PLACEMENT            Social History     Socioeconomic History    Marital status:    Tobacco Use    Smoking status: Never    Smokeless tobacco: Never   Vaping Use    Vaping Use: Never used   Substance and Sexual Activity    Alcohol use: Never    Drug use: Never    Sexual activity: Defer         Family History   Problem Relation Age of Onset    Breast cancer Mother     Bone cancer Mother     Bone cancer Father           Allergies   Allergen Reactions    Morphine Nausea And Vomiting    Meperidine Rash            Current Outpatient Medications:     acetaminophen (TYLENOL) 500 MG tablet, Take 1 tablet by mouth Every 6 (Six) Hours As Needed for Mild Pain., Disp: , Rfl:     allopurinol (ZYLOPRIM) 100 MG tablet, allopurinol 100 mg tablet  Take 1 tablet every day by oral route., Disp: , Rfl:     anastrozole (ARIMIDEX) 1 MG tablet, Take 1 tablet by mouth Daily., Disp: 90 tablet, Rfl: 3    apixaban (Eliquis) 5 MG tablet tablet, Take 1 tablet by mouth 2 (Two) Times a Day., Disp: 180 tablet, Rfl: 3    apixaban (ELIQUIS) 5 MG tablet tablet, Take 1 tablet  "by mouth 2 (Two) Times a Day. LOT# [id number] EXP APR2025 4 BOXES  LOT# [id number]  EPR APR 2025   4 BOXES LOT# [id number]  EXP  JUN2025   4 BOXES, Disp: 56 tablet, Rfl: 0    carvedilol (COREG) 25 MG tablet, Take 1 tablet by mouth Every 12 (Twelve) Hours., Disp: 180 tablet, Rfl: 3    ferrous gluconate (FERGON) 324 MG tablet, TAKE 1 TABLET BY MOUTH DAILY WITH BREAKFAST, Disp: 30 tablet, Rfl: 5    gabapentin (NEURONTIN) 300 MG capsule, Take 1 capsule by mouth 2 (two) times a day for 90 days., Disp: 180 capsule, Rfl: 2    losartan (COZAAR) 25 MG tablet, TAKE 1/2 TABLET BY MOUTH EVERY NIGHT AT BEDTIME, Disp: 90 tablet, Rfl: 3    silver sulfadiazine (SILVADENE, SSD) 1 % cream, silver sulfadiazine 1 % topical cream  APPLY TO AFFECTED AREA BID, Disp: , Rfl:     spironolactone (ALDACTONE) 25 MG tablet, Take 1 tablet by mouth Every 12 (Twelve) Hours., Disp: 180 tablet, Rfl: 3    traMADol (ULTRAM) 50 MG tablet, Take 1 tablet by mouth 2 (two) times a day., Disp: , Rfl:       ROS:  Cardiac review of systems negative.    Objective     /71   Pulse 92   Ht 165.1 cm (65\")   Wt 90.7 kg (200 lb)   BMI 33.28 kg/m²       General Appearance:   well developed  well nourished  HENT:   oropharynx moist  lips not cyanotic  Respiratory:  no respiratory distress  normal breath sounds  no rales  Cardiovascular:  no jugular venous distention  regular rhythm  S1 normal, S2 normal  no S3, no S4   no murmur  no rub, no thrill  No carotid bruit  pedal pulses normal  lower extremity edema: none    Musculoskeletal:  no clubbing of fingers.   normocephalic, head atraumatic  Skin:   warm, dry  Psychiatric:  judgement and insight appropriate  normal mood and affect    ECHO:    STRESS:    CATH:  No results found for this or any previous visit.    BMP:     Glucose   Date Value Ref Range Status   08/01/2023 87 65 - 99 mg/dL Final     BUN   Date Value Ref Range Status   08/01/2023 20 8 - 23 mg/dL Final     Creatinine   Date Value Ref Range Status "   08/01/2023 1.10 (H) 0.57 - 1.00 mg/dL Final     Sodium   Date Value Ref Range Status   08/01/2023 139 136 - 145 mmol/L Final     Potassium   Date Value Ref Range Status   08/01/2023 5.1 3.5 - 5.2 mmol/L Final     Chloride   Date Value Ref Range Status   08/01/2023 102 98 - 107 mmol/L Final     CO2   Date Value Ref Range Status   08/01/2023 27.4 22.0 - 29.0 mmol/L Final     Calcium   Date Value Ref Range Status   08/01/2023 10.4 8.6 - 10.5 mg/dL Final     BUN/Creatinine Ratio   Date Value Ref Range Status   08/01/2023 18.2 7.0 - 25.0 Final     Anion Gap   Date Value Ref Range Status   08/01/2023 9.6 5.0 - 15.0 mmol/L Final     eGFR   Date Value Ref Range Status   08/01/2023 52.2 (L) >60.0 mL/min/1.73 Final     LIPIDS:  No results found for: CHOL, TRIG, HDL, LDL, VLDL, LDLHDL      Procedures             ASSESSMENT:  Diagnoses and all orders for this visit:    1. Longstanding persistent atrial fibrillation (Primary)    2. Primary hypertension    3. Dyspnea on exertion    Other orders  -     apixaban (ELIQUIS) 5 MG tablet tablet; Take 1 tablet by mouth 2 (Two) Times a Day. LOT# [id number]  EXP APR2025  4 BOXES    LOT# [id number]  [facility] APR 2025   4 BOXES  LOT# [id number]  EXP  JUN2025   4 BOXES  Dispense: 56 tablet; Refill: 0         PLAN:    1.  Patient can continue to use the Lasix just as needed.  She uses it for dyspnea on exertion or weight gain.  When she was on it daily she became dehydrated  2.  Continue the Eliquis.  The patient notes no problems with bleeding.  3.  Blood pressures under good control.  4.  Encouraged the patient to remain as active as she can be and [place] if any exertional chest pain relieved with rest.      Return in about 6 months (around 4/5/2024).     Patient was given instructions and counseling regarding her condition or for health maintenance advice. Please see specific information pulled into the AVS if appropriate.         Cuong Suero MD   10/5/2023  17:14 EDT

## 2024-02-01 ENCOUNTER — OFFICE VISIT (OUTPATIENT)
Dept: ONCOLOGY | Facility: HOSPITAL | Age: 79
End: 2024-02-01
Payer: MEDICARE

## 2024-02-01 VITALS
WEIGHT: 205.69 LBS | TEMPERATURE: 98.2 F | DIASTOLIC BLOOD PRESSURE: 70 MMHG | OXYGEN SATURATION: 97 % | SYSTOLIC BLOOD PRESSURE: 134 MMHG | HEART RATE: 90 BPM | BODY MASS INDEX: 34.27 KG/M2 | HEIGHT: 65 IN | RESPIRATION RATE: 18 BRPM

## 2024-02-01 DIAGNOSIS — Z78.0 POST-MENOPAUSAL: ICD-10-CM

## 2024-02-01 DIAGNOSIS — Z17.0 MALIGNANT NEOPLASM OF LEFT BREAST IN FEMALE, ESTROGEN RECEPTOR POSITIVE, UNSPECIFIED SITE OF BREAST: Primary | ICD-10-CM

## 2024-02-01 DIAGNOSIS — Z12.31 ENCOUNTER FOR SCREENING MAMMOGRAM FOR MALIGNANT NEOPLASM OF BREAST: ICD-10-CM

## 2024-02-01 DIAGNOSIS — C50.912 MALIGNANT NEOPLASM OF LEFT BREAST IN FEMALE, ESTROGEN RECEPTOR POSITIVE, UNSPECIFIED SITE OF BREAST: Primary | ICD-10-CM

## 2024-02-01 PROCEDURE — G0463 HOSPITAL OUTPT CLINIC VISIT: HCPCS | Performed by: INTERNAL MEDICINE

## 2024-02-01 RX ORDER — ANASTROZOLE 1 MG/1
1 TABLET ORAL DAILY
Qty: 90 TABLET | Refills: 3 | Status: SHIPPED | OUTPATIENT
Start: 2024-02-01

## 2024-02-01 NOTE — ASSESSMENT & PLAN NOTE
Patient is on adjuvant anastrozole started 1/21.  Status post treatments as outlined including HER2 based therapy.  I see no evidence of disease recurrence by history, physical examination.  Continue anastrozole for minimum of 5 years.  Refills provided today.  I will see her back in 6 months for continued treatment with lab work and mammogram prior

## 2024-02-01 NOTE — PROGRESS NOTES
Chief Complaint  Breast Cancer    Evangelina Bajwa, Evangelina Rodriguez, KISHAN    Jeramy Phillips presents to [facility] for ongoing treatment of her breast cancer.  She is on adjuvant anastrozole started 1/21.  She is compliant with the regimen.  She denies any problems or side effects from the medication.  She denies new masses or adenopathy.  No unusual aches or pains.  Her biggest complaint is her underlying heart disease which limits her activity.  She follows with cardiology routinely.    Oncology/Hematology History Overview Note     ER+/UT+/HER2+ Breast Cancer:            The breast mass was initially noted on screening mammogram.  Diagnostic     mammogram and ultrasound confirmed a 2.7 x 2.5 cm mass at the 3 o'clock     position.  She underwent biopsy on 4/6/2020.  Pathology was positive for     invasive ductal carcinoma, estrogen receptor positive (75%, moderate),     progesterone receptor positive (40%, moderate) HER-2 equivocal (2+ by IHC), HER-    2 FISH positive, Ki-67 55%.            ECHO Global Hypokinesis 45-50%            Cycle 1 of neoadjuvant TCH on 4/30/2020 -AUC of 4      Cycle 2 of TCH on 5/22/20 (60mg/kg Taxol)       Cycle 3 on 6/12/2020      Cycle 4 on 7/2/2020 - further chemotherapy was halted due to patient     intolerance.            Left breast lumpectomy 8/18/2020: Patient had a residual 15 mm tumor, grade 2     with no associated DCIS.  Lymph vascular invasion was present.  Margins are     negative.  4 lymph nodes were examined and all 4 were found to be neck.  Final     stage [id number].            Since the patient had residual disease on lumpectomy her treatment was switched     to Kadcyla which she will give her 1 year.  First cycle on 10/20/2020.  Decrease    the dose of Kadcyla after her first cycle due to nausea, fatigue, and decreased     appetite.  Cycle 2 on 11/10/2020.            After 2 cycles of Kadcyla patient  was transitioned back 2 Herceptin due to     cytopenias. C7 with Herceptin on 12/1/20. Transitioned back to Kadcyla at a     reduced dose after developing LE edema of Herceptin but developed further     swelling and stopped anticancer therapy entirely.            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 Negative for appetite change, diaphoresis, fever, unexpected weight gain and unexpected weight loss.   HENT:  Negative for hearing loss, mouth sores, sore throat, swollen glands, trouble swallowing and voice change.    Eyes:  Negative for blurred vision.   Respiratory:  Negative for cough, shortness of breath and wheezing.    Cardiovascular:  Negative for chest pain and palpitations.   Gastrointestinal:  Negative for abdominal pain, blood in stool, constipation, diarrhea, nausea and vomiting.   Endocrine: Negative for cold intolerance and heat intolerance.   Genitourinary:  Negative for difficulty urinating, dysuria, frequency, hematuria and urinary incontinence.   Musculoskeletal:  Negative for arthralgias, back pain and myalgias.   Skin:  Negative for rash, skin lesions and wound.   Neurological:  Positive for dizziness and numbness (Leatha foot). Negative for seizures, weakness and headache.   Hematological:  Does not bruise/bleed easily.   Psychiatric/Behavioral:  Negative for depressed mood. The patient is not nervous/anxious.      Current Outpatient Medications on File Prior to Visit   Medication Sig Dispense Refill    acetaminophen (TYLENOL) 500 MG tablet Take 1 tablet by mouth Every 6 (Six) Hours As Needed for Mild Pain.      allopurinol (ZYLOPRIM) 100 MG tablet allopurinol 100 mg tablet   Take 1 tablet every day by oral route.      apixaban (Eliquis) 5 MG tablet tablet Take 1 tablet by mouth 2 (Two) Times  a Day. 180 tablet 3    apixaban (ELIQUIS) 5 MG tablet tablet Take 1 tablet by mouth 2 (Two) Times a Day. LOT# [id number]  EXP APR2025  4 BOXES    LOT# [id number]  EPR APR 2025   4 BOXES      LOT# [id number]  EXP  [id number]            8Boxes  LOT# [id number]  EXP  JUN2025   4 BOXES    LOT#  [id number]  EXP  JUN 2025   8 BOXES 112 tablet 0    carvedilol (COREG) 25 MG tablet Take 1 tablet by mouth Every 12 (Twelve) Hours. 180 tablet 3    ferrous gluconate (FERGON) 324 MG tablet TAKE 1 TABLET BY MOUTH DAILY WITH BREAKFAST 30 tablet 5    losartan (COZAAR) 25 MG tablet TAKE 1/2 TABLET BY MOUTH EVERY NIGHT AT BEDTIME 90 tablet 3    silver sulfadiazine (SILVADENE, SSD) 1 % cream silver sulfadiazine 1 % topical cream   APPLY TO AFFECTED AREA BID      spironolactone (ALDACTONE) 25 MG tablet Take 1 tablet by mouth Every 12 (Twelve) Hours. 180 tablet 3    traMADol (ULTRAM) 50 MG tablet Take 1 tablet by mouth 2 (two) times a day.      [DISCONTINUED] anastrozole (ARIMIDEX) 1 MG tablet Take 1 tablet by mouth Daily. 90 tablet 3    gabapentin (NEURONTIN) 300 MG capsule Take 1 capsule by mouth 2 (two) times a day for 90 days. (Patient taking differently: Take 2 capsules by mouth 2 (two) times a day.) 180 capsule 2     No current facility-administered medications on file prior to visit.       Allergies   Allergen Reactions    Morphine Nausea And Vomiting    Meperidine Rash     Past Medical History:   Diagnosis Date    Atrial fibrillation     Breast cancer     CHF (congestive heart failure)     Hypertension     Presence of cardiac pacemaker 2/7/2020     Past Surgical History:   Procedure Laterality Date    BREAST LUMPECTOMY      CYST REMOVAL      PORTACATH PLACEMENT       Social History     Socioeconomic History    Marital status:    Tobacco Use    Smoking status: Never    Smokeless tobacco: Never   Vaping Use    Vaping Use: Never used   Substance and Sexual Activity    Alcohol use: Never    Drug use: Never    Sexual activity: Defer  "    Family History   Problem Relation Age of Onset    Breast cancer Mother     Bone cancer Mother     Bone cancer Father        Objective   Physical Exam  Vitals reviewed. Exam conducted with a chaperone present.   Constitutional:       General: She is not in acute distress.     Appearance: Normal appearance.   Cardiovascular:      Rate and Rhythm: Normal rate. Rhythm irregular.      Heart sounds: Normal heart sounds. No murmur heard.     No gallop.   Pulmonary:      Effort: Pulmonary effort is normal.      Breath sounds: Normal breath sounds.   Abdominal:      General: Abdomen is flat. Bowel sounds are normal.      Palpations: Abdomen is soft.   Musculoskeletal:      Cervical back: Neck supple.   Lymphadenopathy:      Cervical: No cervical adenopathy.   Neurological:      Mental Status: She is alert.   Psychiatric:         Mood and Affect: Mood normal.         Behavior: Behavior normal.         Vitals:    02/01/24 1337   BP: 134/70   Pulse: 90   Resp: 18   Temp: 98.2 °F (36.8 °C)   SpO2: 97%   Weight: 93.3 kg (205 lb 11 oz)   Height: 165.1 cm (65\")   PainSc: 0-No pain     ECOG score: 1         PHQ-9 Total Score:                    Result Review :   The following data was reviewed by: Isaias David MD on 02/01/2024:  Lab Results   Component Value Date    HGB 12.3 08/01/2023    HCT 38.1 08/01/2023    .1 (H) 08/01/2023     (L) 08/01/2023    WBC 3.91 08/01/2023    NEUTROABS 2.49 08/01/2023    LYMPHSABS 0.97 08/01/2023    MONOSABS 0.35 08/01/2023    EOSABS 0.06 08/01/2023    BASOSABS 0.02 08/01/2023     Lab Results   Component Value Date    GLUCOSE 87 08/01/2023    BUN 20 08/01/2023    CREATININE 1.10 (H) 08/01/2023     08/01/2023    K 5.1 08/01/2023     08/01/2023    CO2 27.4 08/01/2023    CALCIUM 10.4 08/01/2023    PROTEINTOT 6.6 08/01/2023    ALBUMIN 4.1 08/01/2023    BILITOT 0.7 08/01/2023    ALKPHOS 74 08/01/2023    AST 23 08/01/2023    ALT 17 08/01/2023     Lab Results   Component " "Value Date    FREET4 1.1 11/17/2020    TSH 3.000 07/21/2022     Lab Results   Component Value Date    IRON 152 (H) 08/01/2023    LABIRON 43 08/01/2023    TRANSFERRIN 240 08/01/2023    TIBC 358 08/01/2023     Lab Results   Component Value Date    FERRITIN 468.70 (H) 08/01/2023    RHUYYZRP15 332 07/21/2022    FOLATE 16.90 07/21/2022     No results found for: \"PSA\", \"CEA\", \"AFP\", \"\", \"\"          Assessment and Plan    Diagnoses and all orders for this visit:    1. 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  Status post treatments as outlined including HER2 based therapy.  I see no evidence of disease recurrence by history, physical examination.  Continue anastrozole for minimum of 5 years.  Refills provided today.  I will see her back in 6 months for continued treatment with lab work and mammogram prior    Orders:  -     anastrozole (ARIMIDEX) 1 MG tablet; Take 1 tablet by mouth Daily.  Dispense: 90 tablet; Refill: 3  -     CBC & Differential; Future  -     Comprehensive Metabolic Panel; Future    2. Encounter for screening mammogram for malignant neoplasm of breast  -     Mammo Screening Digital Tomosynthesis Bilateral With CAD; Future    3. Post-menopausal  Assessment & Plan:  DEXA scan will be obtained before next visit    Orders:  -     DEXA Bone Density Axial; Future            Patient Follow Up: 6 months    Patient was given instructions and counseling regarding her condition or for health maintenance advice. Please see specific information pulled into the AVS if appropriate.     Isaias David MD    2/1/2024            "

## 2024-02-26 DIAGNOSIS — I48.20 CHRONIC ATRIAL FIBRILLATION: ICD-10-CM

## 2024-02-26 RX ORDER — CARVEDILOL 25 MG/1
25 TABLET ORAL EVERY 12 HOURS
Qty: 180 TABLET | Refills: 3 | Status: SHIPPED | OUTPATIENT
Start: 2024-02-26

## 2024-03-05 DIAGNOSIS — I50.42 CHRONIC COMBINED SYSTOLIC AND DIASTOLIC CONGESTIVE HEART FAILURE: ICD-10-CM

## 2024-03-05 RX ORDER — SPIRONOLACTONE 25 MG/1
25 TABLET ORAL EVERY 12 HOURS
Qty: 180 TABLET | Refills: 3 | Status: SHIPPED | OUTPATIENT
Start: 2024-03-05

## 2024-04-09 ENCOUNTER — OFFICE VISIT (OUTPATIENT)
Dept: CARDIOLOGY | Facility: CLINIC | Age: 79
End: 2024-04-09
Payer: MEDICARE

## 2024-04-09 VITALS
DIASTOLIC BLOOD PRESSURE: 80 MMHG | SYSTOLIC BLOOD PRESSURE: 127 MMHG | HEART RATE: 76 BPM | HEIGHT: 65 IN | BODY MASS INDEX: 35.72 KG/M2 | WEIGHT: 214.4 LBS

## 2024-04-09 DIAGNOSIS — I48.20 CHRONIC ATRIAL FIBRILLATION: Primary | ICD-10-CM

## 2024-04-09 DIAGNOSIS — I50.42 CHRONIC COMBINED SYSTOLIC AND DIASTOLIC CONGESTIVE HEART FAILURE: ICD-10-CM

## 2024-04-09 DIAGNOSIS — I10 PRIMARY HYPERTENSION: ICD-10-CM

## 2024-04-09 PROCEDURE — 3079F DIAST BP 80-89 MM HG: CPT

## 2024-04-09 PROCEDURE — 1159F MED LIST DOCD IN RCRD: CPT

## 2024-04-09 PROCEDURE — 3074F SYST BP LT 130 MM HG: CPT

## 2024-04-09 PROCEDURE — G2211 COMPLEX E/M VISIT ADD ON: HCPCS

## 2024-04-09 PROCEDURE — 1160F RVW MEDS BY RX/DR IN RCRD: CPT

## 2024-04-09 PROCEDURE — 99213 OFFICE O/P EST LOW 20 MIN: CPT

## 2024-04-09 RX ORDER — FUROSEMIDE 20 MG/1
20 TABLET ORAL DAILY PRN
COMMUNITY

## 2024-04-09 NOTE — PROGRESS NOTES
Chief Complaint  Hypertension and Follow-up (6 mo f/u. )    Subjective        History of Present Illness  Yareli Phillips presents to [facility] for follow up.  Patient is a 78-year-old female with past medical history outlined below, significant for systolic and diastolic congestive heart failure, hypertension, atrial fibrillation who presents for routine follow-up.  She feels good for the most part.  Her only complaint is back pain and joint pain.  She denies chest pain or discomfort.  She has no shortness of breath.  She denies any dizziness, lightheadedness or syncope.  She occasionally gets swelling of the lower extremities which is relieved with her as needed Lasix.      Past Medical History:   Diagnosis Date    Atrial fibrillation     Breast cancer     CHF (congestive heart failure)     Hypertension     Presence of cardiac pacemaker 2/7/2020       ALLERGY  Allergies   Allergen Reactions    Morphine Nausea And Vomiting    Meperidine Rash        Past Surgical History:   Procedure Laterality Date    BREAST LUMPECTOMY      CYST REMOVAL      PORTACATH PLACEMENT          Social History     Socioeconomic History    Marital status:    Tobacco Use    Smoking status: Never    Smokeless tobacco: Never   Vaping Use    Vaping status: Never Used   Substance and Sexual Activity    Alcohol use: Never    Drug use: Never    Sexual activity: Defer       Family History   Problem Relation Age of Onset    Breast cancer Mother     Bone cancer Mother     Bone cancer Father         Current Outpatient Medications on File Prior to Visit   Medication Sig    acetaminophen (TYLENOL) 500 MG tablet Take 1 tablet by mouth Every 6 (Six) Hours As Needed for Mild Pain.    allopurinol (ZYLOPRIM) 100 MG tablet allopurinol 100 mg tablet   Take 1 tablet every day by oral route.    anastrozole (ARIMIDEX) 1 MG tablet Take 1 tablet by mouth Daily.    apixaban (Eliquis) 5 MG tablet tablet Take 1 tablet by mouth 2 (Two)  "Times a Day.    carvedilol (COREG) 25 MG tablet TAKE 1 TABLET BY MOUTH EVERY 12 HOURS    ferrous gluconate (FERGON) 324 MG tablet TAKE 1 TABLET BY MOUTH DAILY WITH BREAKFAST    losartan (COZAAR) 25 MG tablet TAKE 1/2 TABLET BY MOUTH EVERY NIGHT AT BEDTIME    silver sulfadiazine (SILVADENE, SSD) 1 % cream silver sulfadiazine 1 % topical cream   APPLY TO AFFECTED AREA BID    spironolactone (ALDACTONE) 25 MG tablet TAKE 1 TABLET BY MOUTH EVERY 12 HOURS    traMADol (ULTRAM) 50 MG tablet Take 1 tablet by mouth 2 (two) times a day.    furosemide (LASIX) 20 MG tablet Take 1 tablet by mouth Daily As Needed (swelling).    [DISCONTINUED] apixaban (ELIQUIS) 5 MG tablet tablet Take 1 tablet by mouth 2 (Two) Times a Day. LOT# [id number]  EXP APR2025  4 BOXES    LOT# [id number]  EPR APR 2025   4 BOXES    LOT#  [id number]   EXP  JUL2025   8 BOXES      LOT# [id number]  EXP  JUN2025            8Boxes  LOT# [id number]  EXP  JUN2025   4 BOXES    LOT#  [id number]  EXP  JUN 2025   8 BOXES (Patient not taking: Reported on 4/9/2024)    [DISCONTINUED] gabapentin (NEURONTIN) 300 MG capsule Take 1 capsule by mouth 2 (two) times a day for 90 days. (Patient taking differently: Take 2 capsules by mouth 2 (two) times a day.)     No current facility-administered medications on file prior to visit.       Objective   Vitals:    04/09/24 1513   BP: 127/80   Pulse: 76   Weight: 97.3 kg (214 lb 6.4 oz)   Height: 165.1 cm (65\")       Physical Exam  Constitutional:       General: She is awake. She is not in acute distress.     Appearance: Normal appearance.   HENT:      Head: Normocephalic.      Nose: Nose normal. No congestion.   Eyes:      Extraocular Movements: Extraocular movements intact.      Conjunctiva/sclera: Conjunctivae normal.      Pupils: Pupils are equal, round, and reactive to light.   Neck:      Thyroid: No thyromegaly.      Vascular: No JVD.   Cardiovascular:      Rate and Rhythm: Normal rate and regular rhythm.      Chest Wall: PMI is not displaced. "      Pulses: Normal pulses.      Heart sounds: Normal heart sounds, S1 normal and S2 normal. No murmur heard.     No friction rub. No gallop. No S3 or S4 sounds.   Pulmonary:      Effort: Pulmonary effort is normal.      Breath sounds: Normal breath sounds. No wheezing, rhonchi or rales.   Abdominal:      General: Bowel sounds are normal.      Palpations: Abdomen is soft.      Tenderness: There is no abdominal tenderness.   Musculoskeletal:      Cervical back: No tenderness.      Right lower leg: No edema.      Left lower leg: No edema.   Lymphadenopathy:      Cervical: No cervical adenopathy.   Skin:     General: Skin is warm and dry.      Capillary Refill: Capillary refill takes less than 2 seconds.      Coloration: Skin is not cyanotic.      Findings: No petechiae or rash.      Nails: There is no clubbing.   Neurological:      Mental Status: She is alert.   Psychiatric:         Mood and Affect: Mood normal.         Behavior: Behavior is cooperative.           Result Review     The following data was reviewed by KISHAN Buchanan on 04/09/24.      CMP          8/1/2023    14:26   CMP   Glucose 87    BUN 20    Creatinine 1.10    EGFR 52.2    Sodium 139    Potassium 5.1    Chloride 102    Calcium 10.4    Total Protein 6.6    Albumin 4.1    Globulin 2.5    Total Bilirubin 0.7    Alkaline Phosphatase 74    AST (SGOT) 23    ALT (SGPT) 17    Albumin/Globulin Ratio 1.6    BUN/Creatinine Ratio 18.2    Anion Gap 9.6      CBC w/diff          8/1/2023    14:26   CBC w/Diff   WBC 3.91    RBC 3.66    Hemoglobin 12.3    Hematocrit 38.1    .1    MCH 33.6    MCHC 32.3    RDW 13.3    Platelets 124    Neutrophil Rel % 63.7    Immature Granulocyte Rel % 0.5    Lymphocyte Rel % 24.8    Monocyte Rel % 9.0    Eosinophil Rel % 1.5    Basophil Rel % 0.5               No results found for this or any previous visit.          Procedures    Assessment & Plan  Diagnoses and all orders for this visit:    1. Chronic atrial  fibrillation (Primary)    2. Chronic combined systolic and diastolic congestive heart failure    3. Primary hypertension    Other orders  -     MAMMO Scan      1.  Continue the Eliquis.  Patient notes no issues with bleeding.  She denies any palpitations.  2.  Patient is euvolemic and well compensated on exam.  Continue the Lasix as needed.  She uses it for dyspnea on exertion or edema.  When she was on it daily she became dehydrated.  3.  Blood pressure is under good control.      The medical services provided during this encounter are part of ongoing care related to this patient's single serious condition or complex condition.          Follow Up   Return in about 6 months (around 10/9/2024) for With Dr. Suero.    Patient was given instructions and counseling regarding her condition or for health maintenance advice. Please see specific information pulled into the AVS if appropriate.     Radha Keith, KISHAN  04/09/24  15:43 EDT    Dictated Utilizing Dragon Dictation

## 2024-04-17 RX ORDER — LOSARTAN POTASSIUM 25 MG/1
TABLET ORAL
Qty: 90 TABLET | Refills: 3 | Status: SHIPPED | OUTPATIENT
Start: 2024-04-17

## 2024-05-01 ENCOUNTER — TELEPHONE (OUTPATIENT)
Dept: ONCOLOGY | Facility: HOSPITAL | Age: 79
End: 2024-05-01
Payer: MEDICARE

## 2024-05-01 NOTE — TELEPHONE ENCOUNTER
Caller: Yareli Phillips    Relationship: Self    Best call back number: [phone]    What is the best time to reach you: ANYTIME    Who are you requesting to speak with (clinical staff, provider,  specific staff member): SONIA    What was the call regarding: PATIENT WOULD LIKE TO SPEAK WITH SONIA

## 2024-05-24 ENCOUNTER — TELEPHONE (OUTPATIENT)
Dept: CARDIOLOGY | Facility: CLINIC | Age: 79
End: 2024-05-24
Payer: MEDICARE

## 2024-05-24 NOTE — TELEPHONE ENCOUNTER
Procedure: L4/5 SANTOS    Medication Directive: Eliquis 3-5 days  prior    PMH: AFIB, CHF, HTN    Last Seen:  04/09/24

## 2024-05-24 NOTE — TELEPHONE ENCOUNTER
The patient has undergone cardiovascular evaluation from a cardiac standpoint only. Patient is low risk and is ok to proceed.  Okay to hold Eliquis for 3 to 5 days prior to the procedure.

## 2024-08-01 ENCOUNTER — OFFICE VISIT (OUTPATIENT)
Dept: ONCOLOGY | Facility: HOSPITAL | Age: 79
End: 2024-08-01
Payer: MEDICARE

## 2024-08-01 VITALS
SYSTOLIC BLOOD PRESSURE: 134 MMHG | TEMPERATURE: 98 F | OXYGEN SATURATION: 100 % | BODY MASS INDEX: 33.94 KG/M2 | HEART RATE: 83 BPM | HEIGHT: 65 IN | WEIGHT: 203.71 LBS | RESPIRATION RATE: 18 BRPM | DIASTOLIC BLOOD PRESSURE: 74 MMHG

## 2024-08-01 DIAGNOSIS — C50.912 MALIGNANT NEOPLASM OF LEFT BREAST IN FEMALE, ESTROGEN RECEPTOR POSITIVE, UNSPECIFIED SITE OF BREAST: Primary | ICD-10-CM

## 2024-08-01 DIAGNOSIS — Z17.0 MALIGNANT NEOPLASM OF LEFT BREAST IN FEMALE, ESTROGEN RECEPTOR POSITIVE, UNSPECIFIED SITE OF BREAST: Primary | ICD-10-CM

## 2024-08-01 PROCEDURE — G0463 HOSPITAL OUTPT CLINIC VISIT: HCPCS | Performed by: INTERNAL MEDICINE

## 2024-08-01 RX ORDER — BENZOCAINE/MENTHOL 6 MG-10 MG
LOZENGE MUCOUS MEMBRANE SEE ADMIN INSTRUCTIONS
COMMUNITY
Start: 2024-07-17

## 2024-08-01 RX ORDER — GABAPENTIN 400 MG/1
CAPSULE ORAL
COMMUNITY
Start: 2024-04-11

## 2024-08-01 RX ORDER — ANASTROZOLE 1 MG/1
1 TABLET ORAL DAILY
Qty: 90 TABLET | Refills: 3 | Status: SHIPPED | OUTPATIENT
Start: 2024-08-01

## 2024-08-01 NOTE — PROGRESS NOTES
Chief Complaint  FOLLOW UP 2 - BREAST CA    Evangelina Bajwa, Evangelina Rodriguez, KISHAN Deshpande          Yareli Phillips presents to [facility] for ongoing treatment of her breast cancer.  She is on adjuvant anastrozole.  She is compliant with the regimen.  She denies new masses, adenopathy, unusual aches or pains.  She is trying exercise.  She reports occasional hot flashes.    Oncology/Hematology History Overview Note     ER+/IL+/HER2+ Breast Cancer:            The breast mass was initially noted on screening mammogram.  Diagnostic     mammogram and ultrasound confirmed a 2.7 x 2.5 cm mass at the 3 o'clock     position.  She underwent biopsy on 4/6/2020.  Pathology was positive for     invasive ductal carcinoma, estrogen receptor positive (75%, moderate),     progesterone receptor positive (40%, moderate) HER-2 equivocal (2+ by IHC), HER-    2 FISH positive, Ki-67 55%.            ECHO Global Hypokinesis 45-50%            Cycle 1 of neoadjuvant TCH on 4/30/2020 -AUC of 4      Cycle 2 of TCH on 5/22/20 (60mg/kg Taxol)       Cycle 3 on 6/12/2020      Cycle 4 on 7/2/2020 - further chemotherapy was halted due to patient     intolerance.            Left breast lumpectomy 8/18/2020: Patient had a residual 15 mm tumor, grade 2     with no associated DCIS.  Lymph vascular invasion was present.  Margins are     negative.  4 lymph nodes were examined and all 4 were found to be neck.  Final     stage [id number].            Since the patient had residual disease on lumpectomy her treatment was switched     to Kadcyla which she will give her 1 year.  First cycle on 10/20/2020.  Decrease    the dose of Kadcyla after her first cycle due to nausea, fatigue, and decreased     appetite.  Cycle 2 on 11/10/2020.            After 2 cycles of Kadcyla patient was transitioned back 2 Herceptin due to     cytopenias. C7 with Herceptin on 12/1/20. Transitioned back to Kadcyla at a     reduced  dose after developing LE edema of Herceptin but developed further     swelling and stopped anticancer therapy entirely.            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      allopurinol (ZYLOPRIM) 100 MG tablet allopurinol 100 mg tablet   Take 1 tablet every day by oral route.      apixaban (Eliquis) 5 MG tablet tablet Take 1 tablet by mouth 2 (Two) Times a Day. 56 tablet 0    carvedilol (COREG) 25 MG tablet TAKE 1 TABLET BY MOUTH EVERY 12 HOURS 180 tablet 3    gabapentin (NEURONTIN) 400 MG capsule Take 1 capsule twice a day by oral route in the evening.      hydrocortisone 1 % cream Apply  topically to the appropriate area as directed See Admin Instructions. Apply topically to the affected area twice daily      losartan (COZAAR) 25 MG tablet TAKE 1/2 TABLET BY MOUTH EVERY NIGHT AT BEDTIME 90 tablet 3    spironolactone (ALDACTONE) 25 MG tablet TAKE 1 TABLET BY MOUTH EVERY 12 HOURS 180 tablet 3    traMADol (ULTRAM) 50 MG tablet Take 1 tablet by mouth 2 (two) times a day.      apixaban (ELIQUIS) 5 MG tablet tablet Take 1 tablet by mouth 2 (Two) Times a Day. LOT#  [id number]  EXP  SEP 2025  8 BOXES (Patient not taking: Reported on 8/1/2024) 112 tablet 0    ferrous gluconate (FERGON) 324 MG tablet TAKE 1 TABLET BY MOUTH DAILY WITH BREAKFAST (Patient not taking: Reported on 8/1/2024) 30 tablet 5    furosemide (LASIX) 20 MG tablet Take 1 tablet by mouth Daily As Needed (swelling). (Patient not taking: Reported on 8/1/2024)      silver sulfadiazine (SILVADENE, SSD) 1 % cream silver sulfadiazine 1 % topical cream   APPLY TO AFFECTED AREA BID (Patient not taking: Reported on  "8/1/2024)       No current facility-administered medications on file prior to visit.       Allergies   Allergen Reactions    Morphine Nausea And Vomiting    Meperidine Rash     Past Medical History:   Diagnosis Date    Atrial fibrillation     Breast cancer     CHF (congestive heart failure)     Hypertension     Presence of cardiac pacemaker 2/7/2020     Past Surgical History:   Procedure Laterality Date    BREAST LUMPECTOMY      CYST REMOVAL      PORTACATH PLACEMENT       Social History     Socioeconomic History    Marital status:    Tobacco Use    Smoking status: Never    Smokeless tobacco: Never   Vaping Use    Vaping status: Never Used   Substance and Sexual Activity    Alcohol use: Never    Drug use: Never    Sexual activity: Defer     Family History   Problem Relation Age of Onset    Breast cancer Mother     Bone cancer Mother     Bone cancer Father        Objective   Physical Exam  Vitals reviewed. Exam conducted with a chaperone present.   Cardiovascular:      Rate and Rhythm: Normal rate and regular rhythm.      Heart sounds: Normal heart sounds. No murmur heard.     No gallop.   Pulmonary:      Effort: Pulmonary effort is normal.      Breath sounds: Normal breath sounds.   Chest:   Breasts:     Right: Normal.      Left: Skin change (See diagram) present.       Abdominal:      General: Bowel sounds are normal.   Musculoskeletal:      Right lower leg: No edema.      Left lower leg: No edema.   Lymphadenopathy:      Cervical: No cervical adenopathy.      Upper Body:      Right upper body: No supraclavicular or axillary adenopathy.      Left upper body: No supraclavicular or axillary adenopathy.   Psychiatric:         Mood and Affect: Mood normal.         Behavior: Behavior normal.         Vitals:    08/01/24 1345   BP: 134/74   Pulse: 83   Resp: 18   Temp: 98 °F (36.7 °C)   TempSrc: Temporal   SpO2: 100%   Weight: 92.4 kg (203 lb 11.3 oz)   Height: 165.1 cm (65\")   PainSc:   1     ECOG score: 0     " "    PHQ-9 Total Score:                    Result Review :   The following data was reviewed by: Isaias David MD on 08/01/2024:  Lab Results   Component Value Date    HGB 12.3 08/01/2023    HCT 38.1 08/01/2023    .1 (H) 08/01/2023     (L) 08/01/2023    WBC 3.91 08/01/2023    NEUTROABS 2.49 08/01/2023    LYMPHSABS 0.97 08/01/2023    MONOSABS 0.35 08/01/2023    EOSABS 0.06 08/01/2023    BASOSABS 0.02 08/01/2023     Lab Results   Component Value Date    GLUCOSE 87 08/01/2023    BUN 20 08/01/2023    CREATININE 1.10 (H) 08/01/2023     08/01/2023    K 5.1 08/01/2023     08/01/2023    CO2 27.4 08/01/2023    CALCIUM 10.4 08/01/2023    PROTEINTOT 6.6 08/01/2023    ALBUMIN 4.1 08/01/2023    BILITOT 0.7 08/01/2023    ALKPHOS 74 08/01/2023    AST 23 08/01/2023    ALT 17 08/01/2023     Lab Results   Component Value Date    FREET4 1.1 11/17/2020    TSH 3.000 07/21/2022     Lab Results   Component Value Date    IRON 152 (H) 08/01/2023    LABIRON 43 08/01/2023    TRANSFERRIN 240 08/01/2023    TIBC 358 08/01/2023     Lab Results   Component Value Date    FERRITIN 468.70 (H) 08/01/2023    OQITBERD05 332 07/21/2022    FOLATE 16.90 07/21/2022     No results found for: \"PSA\", \"CEA\", \"AFP\", \"\", \"\"          Assessment and Plan    Diagnoses and all orders for this visit:    1. 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  She is compliant with the regimen.  She notes occasional hot flashes.  I see no evidence of disease recurrence by history or physical examination.  She is up-to-date on mammogram.  Continue anastrozole for minimum 5 years.  Refills provided today.  I will see her back in 6 months for continued treatment.    Orders:  -     anastrozole (ARIMIDEX) 1 MG tablet; Take 1 tablet by mouth Daily.  Dispense: 90 tablet; Refill: 3            Patient Follow Up: 6 " months    Patient was given instructions and counseling regarding her condition or for health maintenance advice. Please see specific information pulled into the AVS if appropriate.     Isaias David MD    8/2/2024

## 2024-08-02 NOTE — ASSESSMENT & PLAN NOTE
Patient is on adjuvant anastrozole started 1/21.  She is compliant with the regimen.  She notes occasional hot flashes.  I see no evidence of disease recurrence by history or physical examination.  She is up-to-date on mammogram.  Continue anastrozole for minimum 5 years.  Refills provided today.  I will see her back in 6 months for continued treatment.

## 2024-10-17 ENCOUNTER — OFFICE VISIT (OUTPATIENT)
Dept: CARDIOLOGY | Facility: CLINIC | Age: 79
End: 2024-10-17
Payer: MEDICARE

## 2024-10-17 VITALS
SYSTOLIC BLOOD PRESSURE: 145 MMHG | HEART RATE: 81 BPM | HEIGHT: 65 IN | BODY MASS INDEX: 34.26 KG/M2 | WEIGHT: 205.6 LBS | DIASTOLIC BLOOD PRESSURE: 84 MMHG

## 2024-10-17 DIAGNOSIS — I48.20 CHRONIC ATRIAL FIBRILLATION: Primary | ICD-10-CM

## 2024-10-17 DIAGNOSIS — I10 PRIMARY HYPERTENSION: ICD-10-CM

## 2024-10-17 DIAGNOSIS — I50.42 CHRONIC COMBINED SYSTOLIC AND DIASTOLIC CONGESTIVE HEART FAILURE: ICD-10-CM

## 2024-10-17 NOTE — ASSESSMENT & PLAN NOTE
Stable, euvolemic on exam.  Continue spironolactone, losartan, carvedilol.  May consider repeat echocardiogram at next follow-up visit.

## 2024-10-17 NOTE — PROGRESS NOTES
Chief Complaint  Hypertension, Congestive Heart Failure, and Follow-up (6 mo f/u. )    Subjective        History of Present Illness  Yareli Phillips presents to [facility] CARDIOLOGY for follow up.   Patient is a 79-year-old female with past medical history outlined below, significant for hypertension, chronic atrial fibrillation, chronic combined systolic and diastolic heart failure who presents for follow-up.  She is doing well from a cardiac standpoint.  She denies any chest pain or discomfort, dyspnea, palpitations, edema or syncope.  Past Medical History:   Diagnosis Date    Atrial fibrillation     Breast cancer     CHF (congestive heart failure)     Hypertension     Presence of cardiac pacemaker 2/7/2020       ALLERGY  Allergies   Allergen Reactions    Morphine Nausea And Vomiting    Meperidine Rash        Past Surgical History:   Procedure Laterality Date    BREAST LUMPECTOMY      CYST REMOVAL      PORTACATH PLACEMENT          Social History     Socioeconomic History    Marital status:    Tobacco Use    Smoking status: Never    Smokeless tobacco: Never   Vaping Use    Vaping status: Never Used   Substance and Sexual Activity    Alcohol use: Never    Drug use: Never    Sexual activity: Defer       Family History   Problem Relation Age of Onset    Breast cancer Mother     Bone cancer Mother     Bone cancer Father         Current Outpatient Medications on File Prior to Visit   Medication Sig    acetaminophen (TYLENOL) 500 MG tablet Take 1 tablet by mouth Every 6 (Six) Hours As Needed for Mild Pain.    allopurinol (ZYLOPRIM) 100 MG tablet allopurinol 100 mg tablet   Take 1 tablet every day by oral route.    anastrozole (ARIMIDEX) 1 MG tablet Take 1 tablet by mouth Daily.    carvedilol (COREG) 25 MG tablet TAKE 1 TABLET BY MOUTH EVERY 12 HOURS    gabapentin (NEURONTIN) 400 MG capsule Take 1 capsule twice a day by oral route in the evening.    hydrocortisone 1 % cream Apply  topically to the  "appropriate area as directed See Admin Instructions. Apply topically to the affected area twice daily    losartan (COZAAR) 25 MG tablet TAKE 1/2 TABLET BY MOUTH EVERY NIGHT AT BEDTIME    spironolactone (ALDACTONE) 25 MG tablet TAKE 1 TABLET BY MOUTH EVERY 12 HOURS    traMADol (ULTRAM) 50 MG tablet Take 1 tablet by mouth 2 (two) times a day.    [DISCONTINUED] apixaban (Eliquis) 5 MG tablet tablet Take 1 tablet by mouth 2 (Two) Times a Day.    [DISCONTINUED] apixaban (ELIQUIS) 5 MG tablet tablet Take 1 tablet by mouth 2 (Two) Times a Day. LOT#  [id number]  EXP  SEP 2025  8 BOXES  LOT# [id number]   EXP OCT2025   8 BOXES    [DISCONTINUED] ferrous gluconate (FERGON) 324 MG tablet TAKE 1 TABLET BY MOUTH DAILY WITH BREAKFAST (Patient not taking: Reported on 10/17/2024)    [DISCONTINUED] furosemide (LASIX) 20 MG tablet Take 1 tablet by mouth Daily As Needed (swelling). (Patient not taking: Reported on 10/17/2024)    [DISCONTINUED] silver sulfadiazine (SILVADENE, SSD) 1 % cream silver sulfadiazine 1 % topical cream   APPLY TO AFFECTED AREA BID (Patient not taking: Reported on 10/17/2024)     No current facility-administered medications on file prior to visit.       Objective   Vitals:    10/17/24 1359   BP: 145/84   Pulse: 81   Weight: 93.3 kg (205 lb 9.6 oz)   Height: 165.1 cm (65\")       Physical Exam  Constitutional:       General: She is awake. She is not in acute distress.     Appearance: Normal appearance.   HENT:      Head: Normocephalic.      Nose: Nose normal. No congestion.   Eyes:      Extraocular Movements: Extraocular movements intact.      Conjunctiva/sclera: Conjunctivae normal.      Pupils: Pupils are equal, round, and reactive to light.   Neck:      Thyroid: No thyromegaly.      Vascular: No JVD.   Cardiovascular:      Rate and Rhythm: Normal rate and regular rhythm.      Chest Wall: PMI is not displaced.      Pulses: Normal pulses.      Heart sounds: Normal heart sounds, S1 normal and S2 normal. No murmur " heard.     No friction rub. No gallop. No S3 or S4 sounds.   Pulmonary:      Effort: Pulmonary effort is normal.      Breath sounds: Normal breath sounds. No wheezing, rhonchi or rales.   Abdominal:      General: Bowel sounds are normal.      Palpations: Abdomen is soft.      Tenderness: There is no abdominal tenderness.   Musculoskeletal:      Cervical back: No tenderness.      Right lower leg: No edema.      Left lower leg: No edema.   Lymphadenopathy:      Cervical: No cervical adenopathy.   Skin:     General: Skin is warm and dry.      Capillary Refill: Capillary refill takes less than 2 seconds.      Coloration: Skin is not cyanotic.      Findings: No petechiae or rash.      Nails: There is no clubbing.   Neurological:      Mental Status: She is alert.   Psychiatric:         Mood and Affect: Mood normal.         Behavior: Behavior is cooperative.       Procedures    Assessment & Plan  Chronic atrial fibrillation  Continue Eliquis 5 mg twice daily for stroke risk reduction.  Continue carvedilol 25 mg twice daily for rate control.  Chronic combined systolic and diastolic congestive heart failure  Stable, euvolemic on exam.  Continue spironolactone, losartan, carvedilol.  May consider repeat echocardiogram at next follow-up visit.  Primary hypertension  Well-controlled on current regimen which will be continued.    The medical services provided during this encounter are part of ongoing care related to this patient's single serious condition or complex condition.    Follow Up   Return in about 6 months (around 4/17/2025) for With Dr. Suero.    Patient was given instructions and counseling regarding her condition or for health maintenance advice. Please see specific information pulled into the AVS if appropriate.     KISHAN Buchanan  10/17/24  15:10 EDT    Dictated Utilizing Dragon Dictation

## 2024-10-17 NOTE — ASSESSMENT & PLAN NOTE
Continue Eliquis 5 mg twice daily for stroke risk reduction.  Continue carvedilol 25 mg twice daily for rate control.

## 2024-10-18 NOTE — TELEPHONE ENCOUNTER
Caller: YANDEL    Relationship: SELF    Best call back number: [phone]     Requested Prescriptions:   Requested Prescriptions     Pending Prescriptions Disp Refills    apixaban (Eliquis) 5 MG tablet tablet 180 tablet 3     Sig: Take 1 tablet by mouth 2 (Two) Times a Day.        Pharmacy where request should be sent: [id number] - [place] [address] - [phone] [facility] [phone] FX     Last office visit with prescribing clinician: 10/17/2024   Last telemedicine visit with prescribing clinician: Visit date not found   Next office visit with prescribing clinician: Visit date not found     Additional details provided by patient: 30 DAY SUPPLY PLEASE.  MEDICATION WAS SENT TO WRONG PHARMACY. THANK YOU!    Does the patient have less than a 3 day supply:  [x] Yes  [] No    Would you like a call back once the refill request has been completed: [] Yes [] No    If the office needs to give you a call back, can they leave a voicemail: [] Yes [] No    Artur Rosen Rep   10/18/24 13:11 EDT

## 2024-10-21 RX ORDER — APIXABAN 5 MG/1
TABLET, FILM COATED ORAL
Qty: 60 TABLET | OUTPATIENT
Start: 2024-10-21

## 2025-02-04 ENCOUNTER — TELEPHONE (OUTPATIENT)
Dept: ONCOLOGY | Facility: HOSPITAL | Age: 80
End: 2025-02-04
Payer: MEDICARE

## 2025-02-04 ENCOUNTER — OFFICE VISIT (OUTPATIENT)
Dept: ONCOLOGY | Facility: HOSPITAL | Age: 80
End: 2025-02-04
Payer: MEDICARE

## 2025-02-04 VITALS
DIASTOLIC BLOOD PRESSURE: 67 MMHG | SYSTOLIC BLOOD PRESSURE: 116 MMHG | OXYGEN SATURATION: 96 % | RESPIRATION RATE: 16 BRPM | TEMPERATURE: 97.9 F | HEIGHT: 65 IN | HEART RATE: 83 BPM | WEIGHT: 205.5 LBS | BODY MASS INDEX: 34.24 KG/M2

## 2025-02-04 DIAGNOSIS — Z17.0 MALIGNANT NEOPLASM OF LEFT BREAST IN FEMALE, ESTROGEN RECEPTOR POSITIVE, UNSPECIFIED SITE OF BREAST: ICD-10-CM

## 2025-02-04 DIAGNOSIS — N93.9 VAGINAL SPOTTING: ICD-10-CM

## 2025-02-04 DIAGNOSIS — Z12.31 ENCOUNTER FOR SCREENING MAMMOGRAM FOR MALIGNANT NEOPLASM OF BREAST: Primary | ICD-10-CM

## 2025-02-04 DIAGNOSIS — C50.912 MALIGNANT NEOPLASM OF LEFT BREAST IN FEMALE, ESTROGEN RECEPTOR POSITIVE, UNSPECIFIED SITE OF BREAST: ICD-10-CM

## 2025-02-04 PROCEDURE — G0463 HOSPITAL OUTPT CLINIC VISIT: HCPCS | Performed by: NURSE PRACTITIONER

## 2025-02-04 RX ORDER — ANASTROZOLE 1 MG/1
1 TABLET ORAL DAILY
Qty: 90 TABLET | Refills: 2 | Status: CANCELLED | OUTPATIENT
Start: 2025-02-04

## 2025-02-04 NOTE — TELEPHONE ENCOUNTER
Called the [facility] for lab results as requested by Vianca and patient. I called and left voicemail.  
3

## 2025-02-04 NOTE — PROGRESS NOTES
Chief Complaint/Reason for Referral:  Follow-up (Breast cancer- left )    Provider, No Known  Evangelina Bajwa APRN    Records Obtained:  Records of the patients history including those obtained from  [facility] and patient information were reviewed and summarized in detail.    Subjective    History of Present Illness  Ms. Yareli Phillips presents for 6 month follow up  for ongoing treatment of her breast cancer.      She is on adjuvant anastrozole.  She is compliant with the regimen.  Reports she is having increased arthralgia's. These have been ongoing but she feels are worsening recently. We discussed to stop the Anastrozole to see if symptoms improve, but she did not wish to do this at this time. She reports is taking Caltrate daily.     Reports she has noticed some vaginal bleeding and went to see GYN and was told had pre-cancerous cells. Vaginal bleeding may occur  once daily and worse with standing. She did not go back to the GYN. She reports she will be seeing Dr. Bronson in April for the vaginal bleeding.     She is due for mammogram in August. Bone density is up to date. Reports she had lab work at her PCP office a few weeks ago and asked for the lab work to be sent here. I do not see the lab results scanned into the EPIC she is speaking of.        Cancer Staging   No matching staging information was found for the patient.       Treatment intent: curative    Oncology/Hematology History Overview Note     ER+/KY+/HER2+ Breast Cancer:            The breast mass was initially noted on screening mammogram.  Diagnostic     mammogram and ultrasound confirmed a 2.7 x 2.5 cm mass at the 3 o'clock     position.  She underwent biopsy on 4/6/2020.  Pathology was positive for     invasive ductal carcinoma, estrogen receptor positive (75%, moderate),     progesterone receptor positive (40%, moderate) HER-2 equivocal (2+ by IHC), HER-    2 FISH positive, Ki-67 55%.            ECHO Global Hypokinesis 45-50%            Cycle 1  of neoadjuvant TCH on 4/30/2020 -AUC of 4      Cycle 2 of TCH on 5/22/20 (60mg/kg Taxol)       Cycle 3 on 6/12/2020      Cycle 4 on 7/2/2020 - further chemotherapy was halted due to patient     intolerance.            Left breast lumpectomy 8/18/2020: Patient had a residual 15 mm tumor, grade 2     with no associated DCIS.  Lymph vascular invasion was present.  Margins are     negative.  4 lymph nodes were examined and all 4 were found to be neck.  Final     stage [id number].            Since the patient had residual disease on lumpectomy her treatment was switched     to Kadcyla which she will give her 1 year.  First cycle on 10/20/2020.  Decrease    the dose of Kadcyla after her first cycle due to nausea, fatigue, and decreased     appetite.  Cycle 2 on 11/10/2020.            After 2 cycles of Kadcyla patient was transitioned back 2 Herceptin due to     cytopenias. C7 with Herceptin on 12/1/20. Transitioned back to Kadcyla at a     reduced dose after developing LE edema of Herceptin but developed further     swelling and stopped anticancer therapy entirely.            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   HENT: Negative.     Eyes: Negative.    Respiratory: Negative.     Cardiovascular: Negative.    Gastrointestinal: Negative.    Endocrine: Negative.    Genitourinary: Negative.    Musculoskeletal:         Leg pain   Skin: Negative.    Allergic/Immunologic: Negative.    Neurological:  Positive for weakness.   Hematological:  Bruises/bleeds easily.   Psychiatric/Behavioral: Negative.     All other systems reviewed and are negative.      Current Outpatient Medications on File Prior to Visit   Medication Sig Dispense Refill    acetaminophen (TYLENOL) 500 MG tablet Take 1 tablet by mouth Every 6 (Six) Hours As  Needed for Mild Pain.      allopurinol (ZYLOPRIM) 100 MG tablet allopurinol 100 mg tablet   Take 1 tablet every day by oral route.      anastrozole (ARIMIDEX) 1 MG tablet Take 1 tablet by mouth Daily. 90 tablet 3    apixaban (Eliquis) 5 MG tablet tablet Take 1 tablet by mouth 2 (Two) Times a Day. 180 tablet 3    carvedilol (COREG) 25 MG tablet TAKE 1 TABLET BY MOUTH EVERY 12 HOURS 180 tablet 3    gabapentin (NEURONTIN) 400 MG capsule Take 1 capsule twice a day by oral route in the evening.      hydrocortisone 1 % cream Apply  topically to the appropriate area as directed See Admin Instructions. Apply topically to the affected area twice daily      losartan (COZAAR) 25 MG tablet TAKE 1/2 TABLET BY MOUTH EVERY NIGHT AT BEDTIME 90 tablet 3    spironolactone (ALDACTONE) 25 MG tablet TAKE 1 TABLET BY MOUTH EVERY 12 HOURS 180 tablet 3    traMADol (ULTRAM) 50 MG tablet Take 1 tablet by mouth 2 (two) times a day.       No current facility-administered medications on file prior to visit.       Allergies   Allergen Reactions    Morphine Nausea And Vomiting    Meperidine Rash     Past Medical History:   Diagnosis Date    Atrial fibrillation     Breast cancer     CHF (congestive heart failure)     Hypertension     Presence of cardiac pacemaker 2/7/2020     Past Surgical History:   Procedure Laterality Date    BREAST LUMPECTOMY      CYST REMOVAL      PORTACATH PLACEMENT       Social History     Socioeconomic History    Marital status:    Tobacco Use    Smoking status: Never    Smokeless tobacco: Never   Vaping Use    Vaping status: Never Used   Substance and Sexual Activity    Alcohol use: Never    Drug use: Never    Sexual activity: Defer     Family History   Problem Relation Age of Onset    Breast cancer Mother     Bone cancer Mother     Bone cancer Father      Immunization History   Administered Date(s) Administered    31-influenza Vac Quardvalent Preservativ 11/07/2017    COVID-19 (MODERNA) 1st,2nd,3rd Dose Monovalent  "02/25/2021, 03/18/2021    COVID-19 (PFIZER) Purple Cap Monovalent 02/25/2021, 03/18/2021    FLUAD TRI 65YR+ 12/06/2024    Flu Vaccine Split Quad 10/09/2018    Fluad Quad 65+ 10/25/2023    Fluzone High-Dose 65+yrs 10/28/2021, 09/29/2022    Fluzone Quad >6mos (Multi-dose) 09/20/2019    Hepatitis A 08/08/2018    Influenza MDCK Quadrivalent with Preserative 10/09/2018    Influenza, Unspecified 09/20/2019    Pneumococcal Conjugate 13-Valent (PCV13) 10/25/2016    Pneumococcal Polysaccharide (PPSV23) 04/27/2022       Tobacco Use: Low Risk  (2/4/2025)    Patient History     Smoking Tobacco Use: Never     Smokeless Tobacco Use: Never     Passive Exposure: Not on file       Objective     Physical Exam  Vitals and nursing note reviewed.   Constitutional:       Appearance: Normal appearance.   HENT:      Head: Normocephalic.      Nose: Nose normal.      Mouth/Throat:      Mouth: Mucous membranes are moist.   Eyes:      Extraocular Movements: Extraocular movements intact.   Pulmonary:      Effort: Pulmonary effort is normal. No respiratory distress.   Musculoskeletal:         General: Normal range of motion.      Cervical back: Normal range of motion and neck supple.   Skin:     General: Skin is warm and dry.   Neurological:      General: No focal deficit present.      Mental Status: She is alert and oriented to person, place, and time.   Psychiatric:         Mood and Affect: Mood normal.         Behavior: Behavior normal.         Thought Content: Thought content normal.         Judgment: Judgment normal.         Vitals:    02/04/25 1242   BP: 116/67   Pulse: 83   Resp: 16   Temp: 97.9 °F (36.6 °C)   TempSrc: Temporal   SpO2: 96%   Weight: 93.2 kg (205 lb 8 oz)   Height: 165.1 cm (65\")   PainSc:   6   PainLoc: Leg  Comment: both legs       Wt Readings from Last 3 Encounters:   02/04/25 93.2 kg (205 lb 8 oz)   10/17/24 93.3 kg (205 lb 9.6 oz)   08/01/24 92.4 kg (203 lb 11.3 oz)                 ECOG score: 2         ECOG: (1) " "Restricted in Physically Strenuous Activity, Ambulatory & Able to Do Work of Light Nature  Fall Risk Assessment was completed, and patient is at low risk for falls.  PHQ-9 Total Score:         The patient is  experiencing fatigue. Fatigue score: 6    PT/OT Functional Screening: PT fx screen : No needs identified  Speech Functional Screening: Speech fx screen : No needs identified  Rehab to be ordered: Rehab to be ordered : No needs identified        Result Review :  The following data was reviewed by: KISHAN Johns on 02/04/2025:  Lab Results   Component Value Date    HGB 12.3 08/01/2023    HCT 38.1 08/01/2023    .1 (H) 08/01/2023     (L) 08/01/2023    WBC 3.91 08/01/2023    NEUTROABS 2.49 08/01/2023    LYMPHSABS 0.97 08/01/2023    MONOSABS 0.35 08/01/2023    EOSABS 0.06 08/01/2023    BASOSABS 0.02 08/01/2023     Lab Results   Component Value Date    GLUCOSE 87 08/01/2023    BUN 20 08/01/2023    CREATININE 1.10 (H) 08/01/2023     08/01/2023    K 5.1 08/01/2023     08/01/2023    CO2 27.4 08/01/2023    CALCIUM 10.4 08/01/2023    PROTEINTOT 6.6 08/01/2023    ALBUMIN 4.1 08/01/2023    BILITOT 0.7 08/01/2023    ALKPHOS 74 08/01/2023    AST 23 08/01/2023    ALT 17 08/01/2023     Lab Results   Component Value Date    Ferritin 468.70 (H) 08/01/2023    Folate 16.90 07/21/2022     Lab Results   Component Value Date    IRON 152 (H) 08/01/2023    LABIRON 43 08/01/2023    TRANSFERRIN 240 08/01/2023    TIBC 358 08/01/2023     Lab Results   Component Value Date    FERRITIN 468.70 (H) 08/01/2023    GUMZDSEA25 332 07/21/2022    FOLATE 16.90 07/21/2022     No results found for: \"PSA\", \"CEA\", \"AFP\", \"\", \"\"    No Images in the past 120 days found..    Results             Assessment and Plan:  Diagnoses and all orders for this visit:    1. Encounter for screening mammogram for malignant neoplasm of breast (Primary)  -     Mammo Screening Digital Tomosynthesis Bilateral With CAD; " Future    2. Malignant neoplasm of left breast in female, estrogen receptor positive, unspecified site of breast    3. Vaginal spotting    Left invasive breast cancer diagnosed in August of 2020. Completed lumpectomy / radiation and neoadjuvant chemotherapy / Herceptin  and then switched to Kadcyla. Taking Anastrozole daily as prescribed and has noticed more arthralgia's recently. She has been taking the Anastrozole since January of 2021. We discussed to discontinue for 2 weeks to see if arthralgia's improved and could consider starting a different aromatase inhibitor. She did not wish to change meds right now. Continue Caltrate. Bone density is up to date. Mammogram due in August and this has been ordered. Patient will call if she is not tolerating the Anastrozole and can stop for 2 weeks to see if arthraligia's improve and then consider changing to Letrozole. Does not need refills at this time. She will call. Will see can get copy of lab work done at PCP office: KISHAN Izaguirre.     Vaginal bleeding / spotting: saw GYN previously but then did not go back. Has follow up with Dr. Bronson in April (first available) further evaluation. Told previously she had pre-cancerous cells.       I spent 40 minutes caring for Yareli on this date of service. This time includes time spent by me in the following activities:preparing for the visit, reviewing tests, counseling and educating the patient/family/caregiver, ordering medications, tests, or procedures, referring and communicating with other health care professionals , documenting information in the medical record, and independently interpreting results and communicating that information with the patient/family/caregiver    Patient Follow Up:  6 months with MD.   Patient was given instructions and counseling regarding her condition or for health maintenance advice. Please see specific information pulled into the AVS if appropriate.     Milena Tirado,  APRN    2/4/2025    .tob

## 2025-02-20 DIAGNOSIS — I48.20 CHRONIC ATRIAL FIBRILLATION: ICD-10-CM

## 2025-02-20 RX ORDER — CARVEDILOL 25 MG/1
25 TABLET ORAL EVERY 12 HOURS
Qty: 180 TABLET | Refills: 3 | Status: SHIPPED | OUTPATIENT
Start: 2025-02-20

## 2025-03-09 DIAGNOSIS — I50.42 CHRONIC COMBINED SYSTOLIC AND DIASTOLIC CONGESTIVE HEART FAILURE: ICD-10-CM

## 2025-03-10 RX ORDER — SPIRONOLACTONE 25 MG/1
25 TABLET ORAL EVERY 12 HOURS
Qty: 180 TABLET | Refills: 3 | Status: SHIPPED | OUTPATIENT
Start: 2025-03-10

## 2025-04-01 ENCOUNTER — OFFICE VISIT (OUTPATIENT)
Dept: OBSTETRICS AND GYNECOLOGY | Age: 80
End: 2025-04-01
Payer: MEDICARE

## 2025-04-01 VITALS
SYSTOLIC BLOOD PRESSURE: 121 MMHG | DIASTOLIC BLOOD PRESSURE: 78 MMHG | HEIGHT: 65 IN | BODY MASS INDEX: 34.82 KG/M2 | HEART RATE: 81 BPM | WEIGHT: 209 LBS

## 2025-04-01 DIAGNOSIS — Z12.4 SCREENING FOR CERVICAL CANCER: ICD-10-CM

## 2025-04-01 DIAGNOSIS — N95.0 PMB (POSTMENOPAUSAL BLEEDING): Primary | ICD-10-CM

## 2025-04-01 PROBLEM — M54.16 LUMBAR RADICULOPATHY: Status: ACTIVE | Noted: 2024-04-30

## 2025-04-01 PROBLEM — Z78.0 POST-MENOPAUSAL: Status: RESOLVED | Noted: 2024-02-01 | Resolved: 2025-04-01

## 2025-04-01 PROCEDURE — 99459 PELVIC EXAMINATION: CPT | Performed by: OBSTETRICS & GYNECOLOGY

## 2025-04-01 PROCEDURE — 3078F DIAST BP <80 MM HG: CPT | Performed by: OBSTETRICS & GYNECOLOGY

## 2025-04-01 PROCEDURE — 3074F SYST BP LT 130 MM HG: CPT | Performed by: OBSTETRICS & GYNECOLOGY

## 2025-04-01 PROCEDURE — 99203 OFFICE O/P NEW LOW 30 MIN: CPT | Performed by: OBSTETRICS & GYNECOLOGY

## 2025-04-01 PROCEDURE — 1159F MED LIST DOCD IN RCRD: CPT | Performed by: OBSTETRICS & GYNECOLOGY

## 2025-04-01 PROCEDURE — G0123 SCREEN CERV/VAG THIN LAYER: HCPCS | Performed by: OBSTETRICS & GYNECOLOGY

## 2025-04-01 PROCEDURE — 1160F RVW MEDS BY RX/DR IN RCRD: CPT | Performed by: OBSTETRICS & GYNECOLOGY

## 2025-04-01 PROCEDURE — 87624 HPV HI-RISK TYP POOLED RSLT: CPT | Performed by: OBSTETRICS & GYNECOLOGY

## 2025-04-01 NOTE — ASSESSMENT & PLAN NOTE
The etiology of the patient's postmenopausal bleeding is unclear at this time.  Her bleeding has definitely been persistent for almost a year now.  Her endometrium was not overly thickened however it did rise above the 4 mm indication for biopsy.  I suspect that likely the patient's history of ablation prevented her endometrial biopsy.  Is also concerning that there is possible cervical dysplasia.  We discussed the need for further evaluation regarding this.  A Pap smear was collected today.  I recommended repeat pelvic ultrasound.  Again did the persistence of the patient's bleeding she may require hysteroscopy D&C.  If that were the case I would also consider doing a colposcopy with LEEP due to the suspected presence of cervical dysplasia.

## 2025-04-01 NOTE — PROGRESS NOTES
"[facility]  Gynecological Visit    CC: Postmenopausal bleeding    Subjective:   79 y.o. who presents for evaluation of postmenopausal bleeding.  The patient states that her problem started a little less than a year ago in 2024.  She reports she had a fall for which she was admitted to the hospital.  It was in the hospital that her bleeding started.  At that time it was about the the amount of a menstrual flow.  This lasted for several days and then stopped.  She saw a gynecologist after leaving the hospital who attempted endometrial biopsy.  The patient has a history of an endometrial ablation.  The provider was only able to get cervical tissue which showed precancerous cells.  The patient reports she never followed up after that because the provider recommended she see a doctor in [place] regarding those precancerous cells.  Patient reports she continues to have off-and-on light spotting.  She has never had any further heavy bleeding.  She states she only notices bright red blood when she is up and active.  If she is not doing strenuous activity she notices no blood or even discolored vaginal discharge.    History:   Past medical history, medications, allergies, surgical history, social history, and obstetrical history all reviewed and updated.    Last Completed Pap Smear    This patient has no relevant Health Maintenance data.       Objective:/78   Pulse 81   Ht 165.1 cm (65\")   Wt 94.8 kg (209 lb)   Breastfeeding No   BMI 34.78 kg/m²     Physical Exam  Vitals and nursing note reviewed. Exam conducted with a chaperone present.   Constitutional:       General: She is not in acute distress.     Appearance: Normal appearance. She is not ill-appearing.   HENT:      Head: Normocephalic and atraumatic.   Genitourinary:     General: Normal vulva.      Exam position: Lithotomy position.      Labia:         Right: No rash, tenderness, lesion or injury.         Left: No rash, " tenderness, lesion or injury.       Vagina: No signs of injury. Prolapsed vaginal walls present. No vaginal discharge, erythema, tenderness, bleeding or lesions.      Cervix: No cervical motion tenderness, discharge, friability, lesion, erythema or cervical bleeding.      Uterus: Not deviated, not enlarged and not fixed.       Adnexa:         Right: No mass, tenderness or fullness.          Left: No mass, tenderness or fullness.        Comments: There is significant atrophy of the vagina and cervix.  There is a grade 2 cystocele present.  No bleeding was noted until I performed a Pap smear.  Upon collecting the Pap smear there was some bleeding from the external cervix.  Musculoskeletal:      Right lower leg: No edema.      Left lower leg: No edema.   Skin:     General: Skin is warm and dry.      Findings: No rash.   Neurological:      Mental Status: She is alert and oriented to person, place, and time.   Psychiatric:         Mood and Affect: Mood normal.         Behavior: Behavior normal.         Thought Content: Thought content normal.         Judgment: Judgment normal.     Office visit 7/31/2024 reviewed Dr. Emmanuel Gallegos  Endometrial biopsy pathology 7/31/2024 reviewed: LSIL possible HSIL.  No endometrial tissue noted  Pelvic ultrasound 7/16/2024 reviewed    Assessment and Plan:  Diagnoses and all orders for this visit:    1. PMB (postmenopausal bleeding) (Primary)  Assessment & Plan:  The etiology of the patient's postmenopausal bleeding is unclear at this time.  Her bleeding has definitely been persistent for almost a year now.  Her endometrium was not overly thickened however it did rise above the 4 mm indication for biopsy.  I suspect that likely the patient's history of ablation prevented her endometrial biopsy.  Is also concerning that there is possible cervical dysplasia.  We discussed the need for further evaluation regarding this.  A Pap smear was collected today.  I recommended repeat pelvic ultrasound.   Again did the persistence of the patient's bleeding she may require hysteroscopy D&C.  If that were the case I would also consider doing a colposcopy with LEEP due to the suspected presence of cervical dysplasia.    Orders:  -     Cancel: US Pelvis Complete; Future  -     US Pelvis Complete; Future    2. Screening for cervical cancer  -     IgP, Aptima HPV; Future  -     IgP, Aptima HPV        Follow Up:  Return in about 2 weeks (around 4/15/2025).    Dany Bronson MD  04/01/2025

## 2025-04-04 LAB
CYTOLOGIST CVX/VAG CYTO: NORMAL
CYTOLOGY CVX/VAG DOC CYTO: NORMAL
CYTOLOGY CVX/VAG DOC THIN PREP: NORMAL
DX ICD CODE: NORMAL
HPV I/H RISK 4 DNA CVX QL PROBE+SIG AMP: NEGATIVE
Lab: NORMAL
OTHER STN SPEC: NORMAL
SERVICE CMNT-IMP: NORMAL
STAT OF ADQ CVX/VAG CYTO-IMP: NORMAL

## 2025-04-15 ENCOUNTER — OFFICE VISIT (OUTPATIENT)
Dept: OBSTETRICS AND GYNECOLOGY | Age: 80
End: 2025-04-15
Payer: MEDICARE

## 2025-04-15 VITALS
BODY MASS INDEX: 34.82 KG/M2 | HEART RATE: 80 BPM | WEIGHT: 209 LBS | SYSTOLIC BLOOD PRESSURE: 129 MMHG | DIASTOLIC BLOOD PRESSURE: 83 MMHG | HEIGHT: 65 IN

## 2025-04-15 DIAGNOSIS — N95.0 PMB (POSTMENOPAUSAL BLEEDING): Primary | ICD-10-CM

## 2025-04-15 DIAGNOSIS — I48.20 CHRONIC ATRIAL FIBRILLATION: ICD-10-CM

## 2025-04-15 DIAGNOSIS — I50.42 CHRONIC COMBINED SYSTOLIC AND DIASTOLIC CONGESTIVE HEART FAILURE: ICD-10-CM

## 2025-04-15 RX ORDER — SODIUM CHLORIDE 9 MG/ML
40 INJECTION, SOLUTION INTRAVENOUS AS NEEDED
OUTPATIENT
Start: 2025-04-15

## 2025-04-15 RX ORDER — ONDANSETRON 2 MG/ML
4 INJECTION INTRAMUSCULAR; INTRAVENOUS EVERY 6 HOURS PRN
OUTPATIENT
Start: 2025-04-15

## 2025-04-15 RX ORDER — SODIUM CHLORIDE 0.9 % (FLUSH) 0.9 %
3 SYRINGE (ML) INJECTION EVERY 12 HOURS SCHEDULED
OUTPATIENT
Start: 2025-04-15

## 2025-04-15 RX ORDER — SODIUM CHLORIDE, SODIUM LACTATE, POTASSIUM CHLORIDE, CALCIUM CHLORIDE 600; 310; 30; 20 MG/100ML; MG/100ML; MG/100ML; MG/100ML
125 INJECTION, SOLUTION INTRAVENOUS CONTINUOUS
OUTPATIENT
Start: 2025-04-15 | End: 2025-04-16

## 2025-04-15 RX ORDER — SODIUM CHLORIDE 0.9 % (FLUSH) 0.9 %
10 SYRINGE (ML) INJECTION AS NEEDED
OUTPATIENT
Start: 2025-04-15

## 2025-04-15 NOTE — PROGRESS NOTES
"[facility]  Gynecological Visit    CC: Follow-up ultrasound    Subjective:   79 y.o. who presents in follow-up of an ultrasound regards to postmenopausal bleeding.  The patient reports that she continues to have very small amounts of intermittent vaginal bleeding but this seems to be decreasing.  She has no other complaints.    History:   Past medical history, medications, allergies, surgical history, social history, and obstetrical history all reviewed and updated.    Last Completed Pap Smear    This patient has no relevant Health Maintenance data.       Objective:/83   Pulse 80   Ht 165.1 cm (65\")   Wt 94.8 kg (209 lb)   Breastfeeding No   BMI 34.78 kg/m²     Physical Exam  Vitals and nursing note reviewed.   Constitutional:       General: She is not in acute distress.     Appearance: Normal appearance. She is not ill-appearing.   HENT:      Head: Normocephalic and atraumatic.   Neck:      Thyroid: No thyroid mass or thyromegaly.   Abdominal:      General: Abdomen is flat. There is no distension.      Palpations: Abdomen is soft. There is no mass.      Tenderness: There is no abdominal tenderness. There is no guarding or rebound.   Musculoskeletal:         General: No swelling.      Right lower leg: No edema.      Left lower leg: No edema.   Skin:     General: Skin is warm and dry.      Findings: No rash.   Neurological:      Mental Status: She is alert and oriented to person, place, and time.   Psychiatric:         Mood and Affect: Mood normal.         Behavior: Behavior normal.         Thought Content: Thought content normal.       Assessment and Plan:  Diagnoses and all orders for this visit:    1. PMB (postmenopausal bleeding) (Primary)  Assessment & Plan:  The patient's ultrasound was reviewed today.  The endometrial thickness is reassuring at 3.8 mm.  No other significant findings were noted.  The patient's continued symptoms, however, are concerning.  She underwent " endometrial sampling in the office with Dr. Gallegos, however no endometrial tissue was obtained.  At this point because of the recurrent nature of the symptoms I cannot definitively say that it is impossible for her to have endometrial pathology including malignancy.  I would recommend the patient undergo further evaluation with definitive endometrial sampling.  Since an office evaluation has failed I would recommend a hysteroscopy D&C to evaluate the uterine cavity, and achieve endometrial sampling.  The risks, benefits, and alternatives to the procedure have been reviewed the patient.  The risks included, but not limited to, infection, bleeding, hemorrhage, blood transfusion. Injury to nearby structures including: Bowel, bladder, pelvic blood vessels and nerves, ureters, and other nearby structures.  We discussed the risks of anesthesia.  The risks of postoperative complications, such as: Venous thromboembolism, myocardial infarction, stroke, and death.  The patient expressed her understanding of the risks, benefits, and alternatives to the procedure, and wishes to proceed.    Orders:  -     Case Request; Standing  -     sodium chloride 0.9 % flush 3 mL  -     sodium chloride 0.9 % flush 10 mL  -     sodium chloride 0.9 % infusion 40 mL  -     lactated ringers infusion  -     ondansetron (ZOFRAN) injection 4 mg  -     Case Request    2. Chronic combined systolic and diastolic congestive heart failure    3. Chronic atrial fibrillation  Assessment & Plan:  The patient is on Eliquis for chronic atrial fibrillation.  She will need to stop the Eliquis prior to surgery.  We will have her see her cardiologist to get recommendations on what they would like to do regarding her anticoagulation.  Certainly a bridge to something like Lovenox would be reasonable for this procedure given its a very low risk for excessive bleeding.  The patient will also need general cardiac clearance as she also has a history of congestive heart  failure.  From my standpoint the patient will be able to resume her Eliquis the day after surgery.  For now she remain on her Eliquis and the remainder of her cardiac meds.  We will await cardiology's recommendations.      Other orders  -     Follow Anesthesia Guidelines / Protocol; Future  -     Follow Anesthesia Guidelines / Protocol; Standing  -     Verify / Perform Chlorhexidine Skin Prep; Standing  -     Provide NPO Instructions to Patient; Future  -     Chlorhexidine Skin Prep; Future  -     Notify Physician - Standard; Standing  -     Type & Screen; Standing  -     Insert Peripheral IV; Standing  -     Saline Lock & Maintain IV Access; Standing  -     Place Sequential Compression Device; Standing  -     Maintain Sequential Compression Device; Standing  -     CBC & Differential; Standing  -     Comprehensive Metabolic Panel; Standing  -     aPTT; Standing  -     Protime-INR; Standing        Follow Up:  Return for 2 Week Postop Visit.    Dany Bronson MD  04/15/2025

## 2025-04-17 ENCOUNTER — TELEPHONE (OUTPATIENT)
Dept: CARDIOLOGY | Facility: CLINIC | Age: 80
End: 2025-04-17
Payer: MEDICARE

## 2025-04-17 NOTE — TELEPHONE ENCOUNTER
Procedure: Hysteroscopy, D & C    Medication Directive: Eliquis    PMH: AFIB< CHF, HTN    Last Seen: 10/17/24

## 2025-04-18 NOTE — ASSESSMENT & PLAN NOTE
The patient is on Eliquis for chronic atrial fibrillation.  She will need to stop the Eliquis prior to surgery.  We will have her see her cardiologist to get recommendations on what they would like to do regarding her anticoagulation.  Certainly a bridge to something like Lovenox would be reasonable for this procedure given its a very low risk for excessive bleeding.  The patient will also need general cardiac clearance as she also has a history of congestive heart failure.  From my standpoint the patient will be able to resume her Eliquis the day after surgery.  For now she remain on her Eliquis and the remainder of her cardiac meds.  We will await cardiology's recommendations.

## 2025-04-18 NOTE — ASSESSMENT & PLAN NOTE
The patient's ultrasound was reviewed today.  The endometrial thickness is reassuring at 3.8 mm.  No other significant findings were noted.  The patient's continued symptoms, however, are concerning.  She underwent endometrial sampling in the office with Dr. Gallegos, however no endometrial tissue was obtained.  At this point because of the recurrent nature of the symptoms I cannot definitively say that it is impossible for her to have endometrial pathology including malignancy.  I would recommend the patient undergo further evaluation with definitive endometrial sampling.  Since an office evaluation has failed I would recommend a hysteroscopy D&C to evaluate the uterine cavity, and achieve endometrial sampling.  The risks, benefits, and alternatives to the procedure have been reviewed the patient.  The risks included, but not limited to, infection, bleeding, hemorrhage, blood transfusion. Injury to nearby structures including: Bowel, bladder, pelvic blood vessels and nerves, ureters, and other nearby structures.  We discussed the risks of anesthesia.  The risks of postoperative complications, such as: Venous thromboembolism, myocardial infarction, stroke, and death.  The patient expressed her understanding of the risks, benefits, and alternatives to the procedure, and wishes to proceed.

## 2025-04-22 ENCOUNTER — OFFICE VISIT (OUTPATIENT)
Dept: CARDIOLOGY | Facility: CLINIC | Age: 80
End: 2025-04-22
Payer: MEDICARE

## 2025-04-22 VITALS
SYSTOLIC BLOOD PRESSURE: 157 MMHG | HEIGHT: 65 IN | HEART RATE: 82 BPM | DIASTOLIC BLOOD PRESSURE: 86 MMHG | BODY MASS INDEX: 34.32 KG/M2 | WEIGHT: 206 LBS

## 2025-04-22 DIAGNOSIS — Z01.810 PRE-OPERATIVE CARDIOVASCULAR EXAMINATION: ICD-10-CM

## 2025-04-22 DIAGNOSIS — I10 PRIMARY HYPERTENSION: ICD-10-CM

## 2025-04-22 DIAGNOSIS — I48.20 CHRONIC ATRIAL FIBRILLATION: ICD-10-CM

## 2025-04-22 DIAGNOSIS — I50.42 CHRONIC COMBINED SYSTOLIC AND DIASTOLIC CONGESTIVE HEART FAILURE: Primary | ICD-10-CM

## 2025-04-22 PROCEDURE — 99214 OFFICE O/P EST MOD 30 MIN: CPT

## 2025-04-22 PROCEDURE — 1159F MED LIST DOCD IN RCRD: CPT

## 2025-04-22 PROCEDURE — 3077F SYST BP >= 140 MM HG: CPT

## 2025-04-22 PROCEDURE — 3079F DIAST BP 80-89 MM HG: CPT

## 2025-04-22 PROCEDURE — 1160F RVW MEDS BY RX/DR IN RCRD: CPT

## 2025-04-22 PROCEDURE — G2211 COMPLEX E/M VISIT ADD ON: HCPCS

## 2025-04-22 NOTE — ASSESSMENT & PLAN NOTE
Blood pressure is mildly elevated today but patient is having some sciatica pain.  She reports good blood pressure control at home.  Continue current regimen.

## 2025-04-22 NOTE — PROGRESS NOTES
Chief Complaint  Follow-up (6 month), Atrial Fibrillation, Congestive Heart Failure, and Hypertension    Subjective        History of Present Illness  Yareli Phillips presents to [facility] for follow up.   Patient is a 79-year-old female with past medical history outlined below, significant for hypertension, chronic atrial fibrillation, chronic combined systolic and diastolic heart failure who presents for follow-up. She is doing well from a cardiac standpoint. She denies any chest pain or discomfort, dyspnea, palpitations, edema or syncope.     Past Medical History:   Diagnosis Date    Atrial fibrillation     Breast cancer     CHF (congestive heart failure)     Fibroid     Hypertension     Presence of cardiac pacemaker 02/07/2020       ALLERGY  Allergies   Allergen Reactions    Morphine Nausea And Vomiting    Meperidine Rash        Past Surgical History:   Procedure Laterality Date    BREAST LUMPECTOMY      CYST REMOVAL      PORTACATH PLACEMENT          Social History     Socioeconomic History    Marital status:     Number of children: 1   Tobacco Use    Smoking status: Never    Smokeless tobacco: Never   Vaping Use    Vaping status: Never Used   Substance and Sexual Activity    Alcohol use: Never    Drug use: Never    Sexual activity: Not Currently       Family History   Problem Relation Age of Onset    Breast cancer Mother     Bone cancer Mother     Bone cancer Father         Current Outpatient Medications on File Prior to Visit   Medication Sig    acetaminophen (TYLENOL) 500 MG tablet Take 1 tablet by mouth Every 6 (Six) Hours As Needed for Mild Pain.    allopurinol (ZYLOPRIM) 100 MG tablet allopurinol 100 mg tablet   Take 1 tablet every day by oral route.    anastrozole (ARIMIDEX) 1 MG tablet Take 1 tablet by mouth Daily.    apixaban (Eliquis) 5 MG tablet tablet Take 1 tablet by mouth 2 (Two) Times a Day.    carvedilol (COREG) 25 MG tablet TAKE 1 TABLET BY MOUTH EVERY 12 HOURS  "   gabapentin (NEURONTIN) 400 MG capsule Take 1 capsule twice a day by oral route in the evening.    hydrocortisone 1 % cream Apply  topically to the appropriate area as directed See Admin Instructions. Apply topically to the affected area twice daily    losartan (COZAAR) 25 MG tablet TAKE 1/2 TABLET BY MOUTH EVERY NIGHT AT BEDTIME    spironolactone (ALDACTONE) 25 MG tablet TAKE 1 TABLET BY MOUTH EVERY 12 HOURS    traMADol (ULTRAM) 50 MG tablet Take 1 tablet by mouth 2 (two) times a day.     No current facility-administered medications on file prior to visit.       Objective   Vitals:    04/22/25 1541 04/22/25 1544   BP: 152/98 157/86   BP Location: Right arm Right arm   Patient Position: Sitting Sitting   Cuff Size: Adult Adult   Pulse: 84 82   Weight: 93.4 kg (206 lb)    Height: 165.1 cm (65\")        Physical Exam  Constitutional:       General: She is awake. She is not in acute distress.     Appearance: Normal appearance.   HENT:      Head: Normocephalic.      Nose: Nose normal. No congestion.   Eyes:      Extraocular Movements: Extraocular movements intact.      Conjunctiva/sclera: Conjunctivae normal.      Pupils: Pupils are equal, round, and reactive to light.   Neck:      Thyroid: No thyromegaly.      Vascular: No JVD.   Cardiovascular:      Rate and Rhythm: Normal rate and regular rhythm.      Chest Wall: PMI is not displaced.      Pulses: Normal pulses.      Heart sounds: Normal heart sounds, S1 normal and S2 normal. No murmur heard.     No friction rub. No gallop. No S3 or S4 sounds.   Pulmonary:      Effort: Pulmonary effort is normal.      Breath sounds: Normal breath sounds. No wheezing, rhonchi or rales.   Abdominal:      General: Bowel sounds are normal.      Palpations: Abdomen is soft.      Tenderness: There is no abdominal tenderness.   Musculoskeletal:      Cervical back: No tenderness.      Right lower leg: No edema.      Left lower leg: No edema.   Lymphadenopathy:      Cervical: No cervical " adenopathy.   Skin:     General: Skin is warm and dry.      Capillary Refill: Capillary refill takes less than 2 seconds.      Coloration: Skin is not cyanotic.      Findings: No petechiae or rash.      Nails: There is no clubbing.   Neurological:      Mental Status: She is alert.   Psychiatric:         Mood and Affect: Mood normal.         Behavior: Behavior is cooperative.           Result Review     The following data was reviewed by KISHAN Buchanan on 04/22/25.                   No results found for this or any previous visit.          Procedures      Assessment & Plan  Chronic combined systolic and diastolic congestive heart failure  Euvolemic and well compensated on exam.  Continue current medical therapy.  Check an echocardiogram.  Chronic atrial fibrillation  Rate controlled with carvedilol which will be continued.  Continue Eliquis 5 mg twice daily for stroke risk reduction.  Primary hypertension  Blood pressure is mildly elevated today but patient is having some sciatica pain.  She reports good blood pressure control at home.  Continue current regimen.  Pre-operative cardiovascular examination  Patient is low risk for perioperative cardiovascular complications for upcoming hysterectomy procedure.  She needs to hold her Eliquis for 3 days prior to the procedure.  I have ordered an EKG and echocardiogram which she wants to have done in [place].  She is in pain and does not want to lay flat today.  These should not hold up her clearance.                   The medical services provided during this encounter are part of ongoing care related to this patient's single serious condition or complex condition.  Follow Up   Return in about 6 months (around 10/22/2025) for With Dr. Suero.    Patient was given instructions and counseling regarding her condition or for health maintenance advice. Please see specific information pulled into the AVS if appropriate.     KISHAN Buchanan  04/22/25  15:44  EDT    Dictated Utilizing Dragon Dictation

## 2025-04-22 NOTE — ASSESSMENT & PLAN NOTE
Euvolemic and well compensated on exam.  Continue current medical therapy.  Check an echocardiogram.

## 2025-04-22 NOTE — ASSESSMENT & PLAN NOTE
Rate controlled with carvedilol which will be continued.  Continue Eliquis 5 mg twice daily for stroke risk reduction.

## 2025-04-23 ENCOUNTER — TELEPHONE (OUTPATIENT)
Dept: CARDIOLOGY | Facility: CLINIC | Age: 80
End: 2025-04-23
Payer: MEDICARE

## 2025-04-23 NOTE — TELEPHONE ENCOUNTER
----- Message from Radha Keith sent at 4/21/2025  8:20 AM EDT -----    ----- Message -----  From: Ashley Wheeler RegSched Rep  Sent: 4/15/2025   9:46 AM EDT  To: Cuong Suero MD

## 2025-04-29 RX ORDER — LOSARTAN POTASSIUM 25 MG/1
12.5 TABLET ORAL
Qty: 90 TABLET | Refills: 3 | Status: SHIPPED | OUTPATIENT
Start: 2025-04-29

## 2025-05-27 NOTE — PRE-PROCEDURE INSTRUCTIONS
PATIENT INSTRUCTED TO BE:    - NOTHING TO EAT, DRINK OR CHEW AFTER MIDNIGHT      - TO HOLD ALL VITAMINS, SUPPLEMENTS, NSAIDS FOR ONE WEEK PRIOR TO THEIR SURGICAL PROCEDURE    - INSTRUCTED PT TO USE SURGICAL SOAP 1 TIME THE NIGHT PRIOR TO SURGERY _____5/28______ OR THE AM OF SURGERY ___5/29__________   USE THE SOAP FROM NECK TO TOES, AVOID THEIR FACE, HAIR, AND PRIVATE PARTS. IF USE THE SOAP THE NIGHT PRIOR TO SURGERY, CHANGE BED LINENS AND NO PETS IN THE BED.     INSTRUCTED NO LOTIONS, JEWELRY, PIERCINGS,  NAIL POLISH, OR DEODORANT DAY OF SURGERY    - IF DIABETIC, CHECK BLOOD GLUCOSE IF LESS THAN 70 OR HAVING SYMPTOMS CALL THE PREOP AREA FOR INSTRUCTIONS ON AM OF SURGERY ( [phone])    -INSTRUCTED TO TAKE THE FOLLOWING MEDICATIONS THE DAY OF SURGERY WITH SIPS OF WATER: TYLENOL IF NEEDED, TRAMADOL IF NEEDED, CARVEDILOL, ALLOPURINOL    - DO NOT BRING ANY MEDICATIONS WITH YOU TO THE HOSPITAL THE DAY OF SURGERY, EXCEPT IF USE INHALERS. BRING INHALERS DAY OF SURGERY       - BRING CPAP OR BIPAP TO THE HOSPITAL ONLY IF YOU ARE SPENDING THE NIGHT    - DO NOT SMOKE OR VAPE 24 HOURS PRIOR TO PROCEDURE PER ANESTHESIA REQUEST     -MAKE SURE YOU HAVE A RIDE HOME OR SOMEONE TO STAY WITH YOU THE DAY OF THE PROCEDURE AFTER YOU GO HOME     - FOLLOW ANY OTHER INSTRUCTIONS GIVEN TO YOU BY YOUR SURGEON'S OFFICE.     - DAY OF SURGERY __5/29___, [facility] ( [facility]), COME TO [facility], [facility] FLOOR. CHECK IN AT THE DESK FOR REGISTRATION/SURGERY    - YOU WILL RECEIVE A PHONE CALL THE DAY PRIOR TO SURGERY BETWEEN 1PM AND 4 PM WITH ARRIVAL TIME, IF YOUR SURGERY IS ON A MONDAY YOU WILL RECEIVE A CALL THE FRIDAY PRIOR TO SURGERY DATE    - BRING CASH OR CREDIT CARD FOR COPAYMENT OF MEDICATIONS AFTER SURGERY IF YOU USE THE HOSPITAL PHARMACY (MEDS TO BED)    - PREADMISSION TESTING NURSE MANI OLMSTEAD [phone] IF HAVE ANY QUESTIONS     -PATIENT PROVIDED THE NUMBER FOR PREOP SURGICAL DEPT IF HAD QUESTIONS AFTER  HOURS PRIOR TO SURGERY ( [phone]).  INFORMED PT IF NO ANSWER, LEAVE A MESSAGE AND SOMEONE WILL RETURN THEIR CALL       PATIENT VERBALIZED UNDERSTANDING

## 2025-05-28 ENCOUNTER — ANESTHESIA EVENT (OUTPATIENT)
Dept: PERIOP | Facility: HOSPITAL | Age: 80
End: 2025-05-28
Payer: MEDICARE

## 2025-05-28 PROCEDURE — S0260 H&P FOR SURGERY: HCPCS | Performed by: OBSTETRICS & GYNECOLOGY

## 2025-05-28 NOTE — H&P
[facility]   Gyn History and Physical    Patient Name: Yareli Phillips  : 1945  MRN: [id number]  Primary Care Physician:  Evangelina Bajwa APRN  Date of admission: 2025    Subjective     Chief Complaint: Here for surgery    HPI:  Yareli Phillips is a 79 y.o. female who presents for surgical evaluation of postmenopausal bleeding.  The patient initially presented to me back in April with vaginal bleeding that had been intermittent over almost a year.  She initially saw provider in a nearby town that attempted endometrial biopsy but no tissue was obtained.  The patient has continued to have some off-and-on bleeding.  She has been evaluated with several ultrasounds.  Her most recent shows a normal endometrial thickness however she continues to have off-and-on spotting.    Review of Systems   All systems were reviewed and negative except for: Vaginal spotting    Personal History     Past Medical History:   Diagnosis Date    Atrial fibrillation     FOLLOWS W/DR. RICHARDS    Breast cancer     BREAST LUMPECTOMY    CHF (congestive heart failure)     Fatty tumor     RIGHT UPPER ARM    Fibroid     Gout     Hypertension        Past Surgical History:   Procedure Laterality Date    BREAST LUMPECTOMY      CYST REMOVAL      PORTACATH PLACEMENT         Family History: family history includes Bone cancer in her father and mother; Breast cancer in her mother. Otherwise pertinent FHx was reviewed and not pertinent to current issue.    Social History:  reports that she has never smoked. She has never used smokeless tobacco. She reports that she does not drink alcohol and does not use drugs.    Home Medications:  acetaminophen, allopurinol, anastrozole, apixaban, carvedilol, gabapentin, hydrocortisone, losartan, spironolactone, and traMADol    Allergies:  Allergies   Allergen Reactions    Morphine Nausea And Vomiting    Meperidine Rash       Objective     Vitals:   Temp:  [97.9 °F (36.6 °C)] 97.9 °F (36.6 °C)  Heart Rate:   [81] 81  Resp:  [20] 20  BP: (110)/(75) 110/75  Physical Exam   Constitutional: Awake, alert  Eyes: PERRLA, sclerae anicteric, no conjunctival injection  HENT: NCAT, mucous membranes moist  Neck: Supple, no thyromegaly, no lymphadenopathy, trachea midline  Respiratory: Clear to auscultation bilaterally, nonlabored respirations   Cardiovascular: RRR, no murmurs, rubs, or gallops, palpable pedal pulses bilaterally  Gastrointestinal: Positive bowel sounds, soft, nontender, nondistended  Genitourinary: Normal external genitalia, vagina, and grossly normal-appearing cervix.  There are some atrophic changes.  The uterus is approximately 8 cm in size mobile and nontender.  There are no palpable uterine or adnexal masses.  Musculoskeletal: No bilateral ankle edema, no clubbing or cyanosis to extremities  Psychiatric: Appropriate affect, cooperative  Neurologic: Oriented x 3, strength symmetric in all extremities, speech clear  Skin: No rashes     Result Review:  I have personally reviewed the results from the time of this admission to 5/29/2025 07:41 EDT and agree with these findings:  [x]  Laboratory  []  Microbiology  [x]  Radiology  []  EKG/Telemetry   []  Cardiology/Vascular   [x]  Pathology  [x]  Old records  []  Other:    Assessment / Plan     Assessment:  Active Hospital Problems    Diagnosis     **PMB (postmenopausal bleeding)      Plan:   Given the patient's had recurrent postmenopausal bleeding even in the setting of a normal endometrial thickness I would recommend hysteroscopy D&C for further evaluation.  The risks, benefits, and alternatives to the procedure have been reviewed the patient.  The risks included, but not limited to, infection, bleeding, hemorrhage, blood transfusion. Injury to nearby structures including: Bowel, bladder, pelvic blood vessels and nerves, ureters, and other nearby structures.  We discussed the risks of anesthesia.  The risks of postoperative complications, such as: Venous  thromboembolism, myocardial infarction, stroke, and death.  The patient expressed her understanding of the risks, benefits, and alternatives to the procedure, and wishes to proceed.    Electronically signed by Dany Bronson MD, 05/28/25, 5:52 PM EDT.

## 2025-05-29 ENCOUNTER — HOSPITAL ENCOUNTER (OUTPATIENT)
Facility: HOSPITAL | Age: 80
Setting detail: HOSPITAL OUTPATIENT SURGERY
Discharge: HOME | End: 2025-05-29
Attending: OBSTETRICS & GYNECOLOGY | Admitting: OBSTETRICS & GYNECOLOGY
Payer: MEDICARE

## 2025-05-29 ENCOUNTER — ANESTHESIA (OUTPATIENT)
Dept: PERIOP | Facility: HOSPITAL | Age: 80
End: 2025-05-29
Payer: MEDICARE

## 2025-05-29 VITALS
OXYGEN SATURATION: 90 % | DIASTOLIC BLOOD PRESSURE: 69 MMHG | TEMPERATURE: 97 F | HEIGHT: 65 IN | BODY MASS INDEX: 35.26 KG/M2 | WEIGHT: 211.64 LBS | RESPIRATION RATE: 20 BRPM | HEART RATE: 85 BPM | SYSTOLIC BLOOD PRESSURE: 127 MMHG

## 2025-05-29 DIAGNOSIS — N95.0 PMB (POSTMENOPAUSAL BLEEDING): ICD-10-CM

## 2025-05-29 LAB
ABO GROUP BLD: NORMAL
ABO GROUP BLD: NORMAL
ALBUMIN SERPL-MCNC: 4.1 G/DL (ref 3.5–5.2)
ALBUMIN/GLOB SERPL: 1.5 G/DL
ALP SERPL-CCNC: 67 U/L (ref 39–117)
ALT SERPL W P-5'-P-CCNC: 10 U/L (ref 1–33)
ANION GAP SERPL CALCULATED.3IONS-SCNC: 9.2 MMOL/L (ref 5–15)
APTT PPP: 27.7 SECONDS (ref 24.2–34.2)
AST SERPL-CCNC: 20 U/L (ref 1–32)
BASOPHILS # BLD AUTO: 0.02 10*3/MM3 (ref 0–0.2)
BASOPHILS NFR BLD AUTO: 0.5 % (ref 0–1.5)
BILIRUB SERPL-MCNC: 0.4 MG/DL (ref 0–1.2)
BLD GP AB SCN SERPL QL: NEGATIVE
BUN SERPL-MCNC: 27.6 MG/DL (ref 8–23)
BUN/CREAT SERPL: 24 (ref 7–25)
CALCIUM SPEC-SCNC: 9.8 MG/DL (ref 8.6–10.5)
CHLORIDE SERPL-SCNC: 101 MMOL/L (ref 98–107)
CO2 SERPL-SCNC: 23.8 MMOL/L (ref 22–29)
CREAT SERPL-MCNC: 1.15 MG/DL (ref 0.57–1)
DEPRECATED RDW RBC AUTO: 47.3 FL (ref 37–54)
EGFRCR SERPLBLD CKD-EPI 2021: 49.2 ML/MIN/1.73
EOSINOPHIL # BLD AUTO: 0.08 10*3/MM3 (ref 0–0.4)
EOSINOPHIL NFR BLD AUTO: 1.9 % (ref 0.3–6.2)
ERYTHROCYTE [DISTWIDTH] IN BLOOD BY AUTOMATED COUNT: 12.8 % (ref 12.3–15.4)
GLOBULIN UR ELPH-MCNC: 2.7 GM/DL
GLUCOSE SERPL-MCNC: 94 MG/DL (ref 65–99)
HCT VFR BLD AUTO: 34.6 % (ref 34–46.6)
HGB BLD-MCNC: 11.3 G/DL (ref 12–15.9)
IMM GRANULOCYTES # BLD AUTO: 0.01 10*3/MM3 (ref 0–0.05)
IMM GRANULOCYTES NFR BLD AUTO: 0.2 % (ref 0–0.5)
INR PPP: 1.05 (ref 0.86–1.15)
LYMPHOCYTES # BLD AUTO: 1.07 10*3/MM3 (ref 0.7–3.1)
LYMPHOCYTES NFR BLD AUTO: 24.9 % (ref 19.6–45.3)
MCH RBC QN AUTO: 33.1 PG (ref 26.6–33)
MCHC RBC AUTO-ENTMCNC: 32.7 G/DL (ref 31.5–35.7)
MCV RBC AUTO: 101.5 FL (ref 79–97)
MONOCYTES # BLD AUTO: 0.4 10*3/MM3 (ref 0.1–0.9)
MONOCYTES NFR BLD AUTO: 9.3 % (ref 5–12)
NEUTROPHILS NFR BLD AUTO: 2.71 10*3/MM3 (ref 1.7–7)
NEUTROPHILS NFR BLD AUTO: 63.2 % (ref 42.7–76)
NRBC BLD AUTO-RTO: 0 /100 WBC (ref 0–0.2)
PLATELET # BLD AUTO: 135 10*3/MM3 (ref 140–450)
PMV BLD AUTO: 9.8 FL (ref 6–12)
POTASSIUM SERPL-SCNC: 4.4 MMOL/L (ref 3.5–5.2)
PROT SERPL-MCNC: 6.8 G/DL (ref 6–8.5)
PROTHROMBIN TIME: 14.1 SECONDS (ref 11.8–14.9)
RBC # BLD AUTO: 3.41 10*6/MM3 (ref 3.77–5.28)
RH BLD: NEGATIVE
RH BLD: NEGATIVE
SODIUM SERPL-SCNC: 134 MMOL/L (ref 136–145)
T&S EXPIRATION DATE: NORMAL
WBC NRBC COR # BLD AUTO: 4.29 10*3/MM3 (ref 3.4–10.8)

## 2025-05-29 PROCEDURE — 86850 RBC ANTIBODY SCREEN: CPT | Performed by: OBSTETRICS & GYNECOLOGY

## 2025-05-29 PROCEDURE — 85025 COMPLETE CBC W/AUTO DIFF WBC: CPT | Performed by: OBSTETRICS & GYNECOLOGY

## 2025-05-29 PROCEDURE — 86900 BLOOD TYPING SEROLOGIC ABO: CPT | Performed by: OBSTETRICS & GYNECOLOGY

## 2025-05-29 PROCEDURE — 85610 PROTHROMBIN TIME: CPT | Performed by: OBSTETRICS & GYNECOLOGY

## 2025-05-29 PROCEDURE — 86901 BLOOD TYPING SEROLOGIC RH(D): CPT

## 2025-05-29 PROCEDURE — 25810000003 LACTATED RINGERS PER 1000 ML: Performed by: ANESTHESIOLOGY

## 2025-05-29 PROCEDURE — 86900 BLOOD TYPING SEROLOGIC ABO: CPT

## 2025-05-29 PROCEDURE — 85730 THROMBOPLASTIN TIME PARTIAL: CPT | Performed by: OBSTETRICS & GYNECOLOGY

## 2025-05-29 PROCEDURE — 25010000002 DEXAMETHASONE PER 1 MG: Performed by: NURSE ANESTHETIST, CERTIFIED REGISTERED

## 2025-05-29 PROCEDURE — 25010000002 ONDANSETRON PER 1 MG: Performed by: NURSE ANESTHETIST, CERTIFIED REGISTERED

## 2025-05-29 PROCEDURE — 88305 TISSUE EXAM BY PATHOLOGIST: CPT | Performed by: OBSTETRICS & GYNECOLOGY

## 2025-05-29 PROCEDURE — 25010000002 LIDOCAINE PF 2% 2 % SOLUTION: Performed by: NURSE ANESTHETIST, CERTIFIED REGISTERED

## 2025-05-29 PROCEDURE — 25010000002 LIDOCAINE 1% - EPINEPHRINE 1:100000 1 %-1:100000 SOLUTION: Performed by: OBSTETRICS & GYNECOLOGY

## 2025-05-29 PROCEDURE — 80053 COMPREHEN METABOLIC PANEL: CPT | Performed by: OBSTETRICS & GYNECOLOGY

## 2025-05-29 PROCEDURE — 86901 BLOOD TYPING SEROLOGIC RH(D): CPT | Performed by: OBSTETRICS & GYNECOLOGY

## 2025-05-29 PROCEDURE — 58558 HYSTEROSCOPY BIOPSY: CPT | Performed by: OBSTETRICS & GYNECOLOGY

## 2025-05-29 PROCEDURE — 25010000002 PROPOFOL 10 MG/ML EMULSION: Performed by: NURSE ANESTHETIST, CERTIFIED REGISTERED

## 2025-05-29 PROCEDURE — 25010000002 FENTANYL CITRATE (PF) 50 MCG/ML SOLUTION: Performed by: NURSE ANESTHETIST, CERTIFIED REGISTERED

## 2025-05-29 RX ORDER — SODIUM CHLORIDE 0.9 % (FLUSH) 0.9 %
10 SYRINGE (ML) INJECTION AS NEEDED
Status: DISCONTINUED | OUTPATIENT
Start: 2025-05-29 | End: 2025-05-29 | Stop reason: HOSPADM

## 2025-05-29 RX ORDER — BUPIVACAINE HCL/0.9 % NACL/PF 0.125 %
PLASTIC BAG, INJECTION (ML) EPIDURAL AS NEEDED
Status: DISCONTINUED | OUTPATIENT
Start: 2025-05-29 | End: 2025-05-29 | Stop reason: SURG

## 2025-05-29 RX ORDER — MAGNESIUM HYDROXIDE 1200 MG/15ML
LIQUID ORAL AS NEEDED
Status: DISCONTINUED | OUTPATIENT
Start: 2025-05-29 | End: 2025-05-29 | Stop reason: HOSPADM

## 2025-05-29 RX ORDER — LIDOCAINE HYDROCHLORIDE AND EPINEPHRINE 10; 10 MG/ML; UG/ML
INJECTION, SOLUTION INFILTRATION; PERINEURAL AS NEEDED
Status: DISCONTINUED | OUTPATIENT
Start: 2025-05-29 | End: 2025-05-29 | Stop reason: HOSPADM

## 2025-05-29 RX ORDER — PROPOFOL 10 MG/ML
VIAL (ML) INTRAVENOUS AS NEEDED
Status: DISCONTINUED | OUTPATIENT
Start: 2025-05-29 | End: 2025-05-29 | Stop reason: SURG

## 2025-05-29 RX ORDER — SODIUM CHLORIDE 9 MG/ML
40 INJECTION, SOLUTION INTRAVENOUS AS NEEDED
Status: DISCONTINUED | OUTPATIENT
Start: 2025-05-29 | End: 2025-05-29 | Stop reason: HOSPADM

## 2025-05-29 RX ORDER — IBUPROFEN 600 MG/1
600 TABLET, FILM COATED ORAL EVERY 6 HOURS PRN
Qty: 30 TABLET | Refills: 1 | Status: SHIPPED | OUTPATIENT
Start: 2025-05-29

## 2025-05-29 RX ORDER — SODIUM CHLORIDE, SODIUM LACTATE, POTASSIUM CHLORIDE, CALCIUM CHLORIDE 600; 310; 30; 20 MG/100ML; MG/100ML; MG/100ML; MG/100ML
9 INJECTION, SOLUTION INTRAVENOUS CONTINUOUS PRN
Status: DISCONTINUED | OUTPATIENT
Start: 2025-05-29 | End: 2025-05-29 | Stop reason: HOSPADM

## 2025-05-29 RX ORDER — ONDANSETRON 2 MG/ML
4 INJECTION INTRAMUSCULAR; INTRAVENOUS EVERY 6 HOURS PRN
Status: DISCONTINUED | OUTPATIENT
Start: 2025-05-29 | End: 2025-05-29 | Stop reason: HOSPADM

## 2025-05-29 RX ORDER — LIDOCAINE HYDROCHLORIDE 20 MG/ML
INJECTION, SOLUTION EPIDURAL; INFILTRATION; INTRACAUDAL; PERINEURAL AS NEEDED
Status: DISCONTINUED | OUTPATIENT
Start: 2025-05-29 | End: 2025-05-29 | Stop reason: SURG

## 2025-05-29 RX ORDER — PROMETHAZINE HYDROCHLORIDE 25 MG/1
25 SUPPOSITORY RECTAL ONCE AS NEEDED
Status: DISCONTINUED | OUTPATIENT
Start: 2025-05-29 | End: 2025-05-29 | Stop reason: HOSPADM

## 2025-05-29 RX ORDER — PROMETHAZINE HYDROCHLORIDE 12.5 MG/1
25 TABLET ORAL ONCE AS NEEDED
Status: DISCONTINUED | OUTPATIENT
Start: 2025-05-29 | End: 2025-05-29 | Stop reason: HOSPADM

## 2025-05-29 RX ORDER — OXYCODONE HYDROCHLORIDE 5 MG/1
5 TABLET ORAL
Status: DISCONTINUED | OUTPATIENT
Start: 2025-05-29 | End: 2025-05-29 | Stop reason: HOSPADM

## 2025-05-29 RX ORDER — SCOPOLAMINE 1 MG/3D
1 PATCH, EXTENDED RELEASE TRANSDERMAL ONCE
Status: DISCONTINUED | OUTPATIENT
Start: 2025-05-29 | End: 2025-05-29 | Stop reason: HOSPADM

## 2025-05-29 RX ORDER — ACETAMINOPHEN 500 MG
1000 TABLET ORAL ONCE
Status: DISCONTINUED | OUTPATIENT
Start: 2025-05-29 | End: 2025-05-29 | Stop reason: HOSPADM

## 2025-05-29 RX ORDER — ONDANSETRON 2 MG/ML
INJECTION INTRAMUSCULAR; INTRAVENOUS AS NEEDED
Status: DISCONTINUED | OUTPATIENT
Start: 2025-05-29 | End: 2025-05-29 | Stop reason: SURG

## 2025-05-29 RX ORDER — SODIUM CHLORIDE 0.9 % (FLUSH) 0.9 %
3 SYRINGE (ML) INJECTION EVERY 12 HOURS SCHEDULED
Status: DISCONTINUED | OUTPATIENT
Start: 2025-05-29 | End: 2025-05-29 | Stop reason: HOSPADM

## 2025-05-29 RX ORDER — DEXAMETHASONE SODIUM PHOSPHATE 4 MG/ML
INJECTION, SOLUTION INTRA-ARTICULAR; INTRALESIONAL; INTRAMUSCULAR; INTRAVENOUS; SOFT TISSUE AS NEEDED
Status: DISCONTINUED | OUTPATIENT
Start: 2025-05-29 | End: 2025-05-29 | Stop reason: SURG

## 2025-05-29 RX ORDER — FENTANYL CITRATE 50 UG/ML
INJECTION, SOLUTION INTRAMUSCULAR; INTRAVENOUS AS NEEDED
Status: DISCONTINUED | OUTPATIENT
Start: 2025-05-29 | End: 2025-05-29 | Stop reason: SURG

## 2025-05-29 RX ORDER — SODIUM CHLORIDE, SODIUM LACTATE, POTASSIUM CHLORIDE, CALCIUM CHLORIDE 600; 310; 30; 20 MG/100ML; MG/100ML; MG/100ML; MG/100ML
125 INJECTION, SOLUTION INTRAVENOUS CONTINUOUS
Status: DISCONTINUED | OUTPATIENT
Start: 2025-05-29 | End: 2025-05-29 | Stop reason: HOSPADM

## 2025-05-29 RX ORDER — ONDANSETRON 2 MG/ML
4 INJECTION INTRAMUSCULAR; INTRAVENOUS ONCE AS NEEDED
Status: DISCONTINUED | OUTPATIENT
Start: 2025-05-29 | End: 2025-05-29 | Stop reason: HOSPADM

## 2025-05-29 RX ADMIN — ONDANSETRON 4 MG: 2 INJECTION INTRAMUSCULAR; INTRAVENOUS at 09:09

## 2025-05-29 RX ADMIN — PROPOFOL 120 MG: 10 INJECTION, EMULSION INTRAVENOUS at 09:03

## 2025-05-29 RX ADMIN — Medication 100 MCG: at 09:11

## 2025-05-29 RX ADMIN — SODIUM CHLORIDE, POTASSIUM CHLORIDE, SODIUM LACTATE AND CALCIUM CHLORIDE 9 ML/HR: 600; 310; 30; 20 INJECTION, SOLUTION INTRAVENOUS at 07:57

## 2025-05-29 RX ADMIN — DEXAMETHASONE SODIUM PHOSPHATE 4 MG: 4 INJECTION, SOLUTION INTRAMUSCULAR; INTRAVENOUS at 09:09

## 2025-05-29 RX ADMIN — PROPOFOL 140 MCG/KG/MIN: 10 INJECTION, EMULSION INTRAVENOUS at 09:04

## 2025-05-29 RX ADMIN — SCOLOPAMINE TRANSDERMAL SYSTEM 1 PATCH: 1 PATCH, EXTENDED RELEASE TRANSDERMAL at 07:57

## 2025-05-29 RX ADMIN — LIDOCAINE HYDROCHLORIDE 60 MG: 20 INJECTION, SOLUTION INTRAVENOUS at 09:03

## 2025-05-29 RX ADMIN — FENTANYL CITRATE 100 MCG: 50 INJECTION, SOLUTION INTRAMUSCULAR; INTRAVENOUS at 09:03

## 2025-05-29 RX ADMIN — Medication 100 MCG: at 09:06

## 2025-05-29 NOTE — ANESTHESIA POSTPROCEDURE EVALUATION
Patient: Yareli Phillips    Procedure Summary       Date: 05/29/25 Room / Location: [facility] OR  / [facility]    Anesthesia Start: 0856 Anesthesia Stop: 0944    Procedure: HYSTEROSCOPY ENDOMETRIAL BIOPSY / POLYPECTOMY (Bilateral: Vagina) Diagnosis:       PMB (postmenopausal bleeding)      (PMB (postmenopausal bleeding) [N95.0])    Surgeons: Dany Bronson MD Provider: Luis Abbott MD    Anesthesia Type: general ASA Status: 3            Anesthesia Type: general    Vitals  Vitals Value Taken Time   /69 05/29/25 10:10   Temp 36.1 °C (97 °F) 05/29/25 09:57   Pulse 85 05/29/25 10:10   Resp 20 05/29/25 09:57   SpO2 90 % 05/29/25 10:10           Post Anesthesia Care and Evaluation    Patient location during evaluation: bedside  Patient participation: complete - patient participated  Level of consciousness: awake  Pain management: adequate    Airway patency: patent  PONV Status: none  Cardiovascular status: acceptable and stable  Respiratory status: acceptable  Hydration status: acceptable

## 2025-05-29 NOTE — OP NOTE
[facility]  DILATATION AND CURETTAGE HYSTEROSCOPY WITH MYOSURE  Procedure Report    Patient Name:  Yareli Phillips  YOB: 1945  MRN: [id number]    Date of Surgery:  5/29/2025     Pre-op Diagnosis:   PMB (postmenopausal bleeding) [N95.0]       Post-Op Diagnosis Codes:     * PMB (postmenopausal bleeding) [N95.0]    Procedure(s):  HYSTEROSCOPY ENDOMETRIAL BIOPSY / POLYPECTOMY    Staff:  Surgeon(s):  Dany Bronson MD       Anesthesia: General    Estimated Blood Loss:  10 mL    Specimen:          Specimens       ID Source Type Tests Collected By Collected At Frozen?    A Endometrium Curettings TISSUE PATHOLOGY EXAM   Dany Bronson MD 5/29/25 0925           Findings: Atrophic vagina and cervix.  The endocervical canal was very stenotic.  The uterine cavity was small but appeared normal.  There was no evidence of a submucous myoma, congenital anomaly, or endometrial polyp.  The endometrium appeared very atrophic    Complications: None    Description of Procedure: After reviewing the informed consent, including the risks, benefits and alternatives to the procedure, the patient expressed her understanding and wished to proceed.  She was taken to the operating room with an I.V. in place and running.  She was placed on the operating table in the dorsal supine position. She was given a general anesthetic and an LMA was placed.  She was re-positioned with her arms out to the side, and her legs in Yellofin stirrups.  We took care in positioning both the arms and the legs, padding any potential pressure points.  The patient was prepped and draped in the usual sterile fashion.  An in and out catheterization was performed to drain the patient's bladder.  A surgical time-out was performed.  I inserted a Vargas retractor into the posterior vagina, and a Tone retractor into the anterior vagina. I grasped the cervix with the single tooth tenaculum.  I carried out my paracervical block with 10 ml  of 0.5% Marcaine with Epinephrine. I introduced a 5 mm 30 degree diagnostic hysteroscope, with normal saline as my distension media, through the external cervical.    The cervical canal and internal os were very stenotic.  I was able to pass the hysteroscope easily.  The hysteroscope was removed and I started with the smallest dilator.  I was able to carefully dilate the cervix to allow entry of the hysteroscope into the uterine cavity.    I surveyed the uterine cavity.  There was no evidence of submucous myoma, congenital anomaly, or endometrial polyp.  The endometrium appeared very atrophic.  I performed a systematic sharp curettage of the entire endometrium. This specimen was passed off as endometrial curettings.  I re-introduced the hysteroscope. There was a good curettage of the entire endometrium, and no evidence of any uterine injury. The hysteroscope was removed. I removed the tenaculum.  The tenaculum sites were hemostatic. The other instruments were removed. Normal saline was used as my uterine distention media, and there was no significant fluid deficit.    The patient was taken out of lithotomy position, awoken from her anesthesia and then taken to the recovery room in satisfactory condition.  All counts were correct x 2.  The patient received [default value] as her preoperative antibiotics. The patient will be discharged home when she meets discharge criteria. She will follow-up with me in 2-3 weeks.  She was instructed to call the office for temperature than greater than 100 degrees Fahrenheit, shortness of breath or chest pain, excessive nausea or vomiting with inability to tolerate oral intake, pain that is worsening despite current pain medications, heavy vaginal bleeding, or other concerns.        Dany Bronson MD     Date: 5/29/2025  Time: 09:37 EDT

## 2025-05-29 NOTE — ANESTHESIA PREPROCEDURE EVALUATION
Anesthesia Evaluation     Patient summary reviewed and Nursing notes reviewed   no history of anesthetic complications:   NPO Solid Status: > 8 hours  NPO Liquid Status: > 2 hours           Airway   Mallampati: II  TM distance: >3 FB  Neck ROM: full  No difficulty expected  Dental - normal exam     Pulmonary - negative pulmonary ROS and normal exam    breath sounds clear to auscultation  Cardiovascular   Exercise tolerance: good (4-7 METS)    ECG reviewed  PT is on anticoagulation therapy  Rhythm: irregular  Rate: normal    (+) hypertension, dysrhythmias Atrial Fib, CHF , hyperlipidemia      Neuro/Psych  (+) numbness  GI/Hepatic/Renal/Endo    (+) obesity, renal disease- CRI    Musculoskeletal     Abdominal    Substance History - negative use     OB/GYN negative ob/gyn ROS         Other   arthritis,     ROS/Med Hx Other: >4METS.HX CHF,AFIB,HTN,GOUT.  ECHO 5/12/25 EF 45%,MILD AI,MILD GLOBAL HYPOKINESIS.   CARDS OV 5/8/25.   CARDS CLEARANCE,ACCEPTABLE RISK,MED DIRECTIVE 4/22/25. KT     EKG 5/8/25- afib, LAFB    Off Eliquis for 3 days              Anesthesia Plan    ASA 3     general     (Patient understands anesthesia not responsible for dental damage.)  intravenous induction     Anesthetic plan, risks, benefits, and alternatives have been provided, discussed and informed consent has been obtained with: patient.    Use of blood products discussed with patient .    Plan discussed with CRNA.    CODE STATUS:

## 2025-05-29 NOTE — DISCHARGE INSTRUCTIONS
No driving while taking pain medications  No tub baths, tampons, douching and nothing in vagina until follow-up  Call for temperature greater than 100 °F, shortness of breath or chest pain, heavy vaginal bleeding soaking a pad in less than 1 hour, redness swelling or drainage from the incisions, excessive nausea or vomiting, or pain that is worsening despite current pain medications     DISCHARGE INSTRUCTIONS  GYNECOLOGICAL  PROCEDURES      For your surgery you had:  General anesthesia (you may have a sore throat for the first 24 hours)  You may experience dizziness, drowsiness, or lightheadedness for several hours following surgery.  Do not stay alone today or tonight.  Limit your activity for 24 hours.  Resume your diet slowly.  Follow any special dietary instructions you may have been given by your doctor.  You should not drive or operate machinery, drink alcohol, or sign legally binding documents for 24 hours or while you are taking pain medication.  Last dose of pain medication was given at:  .  NOTIFY YOUR DOCTOR IF YOU EXPERIENCE ANY OF THE FOLLOWING:  Temperature greater than 101 degrees Fahrenheit  Shaking Chills  Redness or excessive drainage from incision  Nausea, vomiting and/or pain that is not controlled by prescribed medications  Increase in bleeding or bleeding that is excessive  Unable to urinate in 6 hours after surgery  If unable to reach your doctor, please go to the closest Emergency Room [] .  [] Nothing in the vagina for 2 weeks to include intercourse, douches, or tampons.  [] Vaginal bleeding may be expected for several days with flow decreasing with time and never any heavier than a normal   period.  If you have foul smelling discharge, notify your physician.  Medications per physician instructions as indicated on Discharge Medication Information Sheet.  SPECIAL INSTRUCTIONS:

## 2025-06-02 LAB
CYTO UR: NORMAL
LAB AP CASE REPORT: NORMAL
LAB AP CLINICAL INFORMATION: NORMAL
PATH REPORT.FINAL DX SPEC: NORMAL
PATH REPORT.GROSS SPEC: NORMAL

## 2025-06-11 ENCOUNTER — OFFICE VISIT (OUTPATIENT)
Dept: OBSTETRICS AND GYNECOLOGY | Age: 80
End: 2025-06-11
Payer: MEDICARE

## 2025-06-11 VITALS
BODY MASS INDEX: 34.99 KG/M2 | HEART RATE: 79 BPM | HEIGHT: 65 IN | SYSTOLIC BLOOD PRESSURE: 127 MMHG | DIASTOLIC BLOOD PRESSURE: 76 MMHG | WEIGHT: 210 LBS

## 2025-06-11 DIAGNOSIS — Z48.89 POSTOPERATIVE VISIT: Primary | ICD-10-CM

## 2025-06-11 DIAGNOSIS — N95.0 PMB (POSTMENOPAUSAL BLEEDING): ICD-10-CM

## 2025-06-11 PROCEDURE — 99024 POSTOP FOLLOW-UP VISIT: CPT | Performed by: OBSTETRICS & GYNECOLOGY

## 2025-06-11 PROCEDURE — 3074F SYST BP LT 130 MM HG: CPT | Performed by: OBSTETRICS & GYNECOLOGY

## 2025-06-11 PROCEDURE — 3078F DIAST BP <80 MM HG: CPT | Performed by: OBSTETRICS & GYNECOLOGY

## 2025-06-11 NOTE — ASSESSMENT & PLAN NOTE
The patient's postmenopausal bleeding has been thoroughly evaluated.  She had a normal endometrial cavity.  On the D&C specimen there was no endometrium present for evaluation which means that the endometrium was so atrophic I could not sufficiently sample it.  This really excludes the chance of endometrial malignancy.  I suspect that her menopausal status along with the need for anticoagulation is likely causing bleeding from the vagina and cervix that she is seeing.  The amount of bleeding she is seen is also compatible with that etiology.  I think unless there is a significant increase in menstrual bleeding in regards to flow or persistence of the bleeding further evaluation is not warranted.  I have informed the patient if she does notice any increase that she should follow-up with me.

## 2025-06-11 NOTE — PROGRESS NOTES
"[facility]  Post Operative Visit      CC: Post operative follow up    Subjective:   Pain:  No  Vaginal bleeding: Spotting  Vaginal discharge:  No  Fever/chills:  No  Good appetite:  Yes  Normal bladder function:  Yes  Normal bowel function:  Yes    The patient reports she is still just having occasional light intermittent spotting.  She reports it may occur once a day.  She states is very light mostly just pink on the tissue when she wipes.    /76   Pulse 79   Ht 165.1 cm (65\")   Wt 95.3 kg (210 lb)   Breastfeeding No   BMI 34.95 kg/m²     Physical Exam  Vitals and nursing note reviewed.   Constitutional:       General: She is not in acute distress.     Appearance: Normal appearance. She is not ill-appearing.   Neurological:      Mental Status: She is alert and oriented to person, place, and time.   Psychiatric:         Mood and Affect: Mood normal.         Behavior: Behavior normal.         Thought Content: Thought content normal.         Judgment: Judgment normal.       Operative report, surgical findings and any pathology reviewed.    Assessment and Plan:  Diagnoses and all orders for this visit:    1. Postoperative visit (Primary)  Assessment & Plan:  Stable postop course  May resume normal activities        2. PMB (postmenopausal bleeding)  Assessment & Plan:  The patient's postmenopausal bleeding has been thoroughly evaluated.  She had a normal endometrial cavity.  On the D&C specimen there was no endometrium present for evaluation which means that the endometrium was so atrophic I could not sufficiently sample it.  This really excludes the chance of endometrial malignancy.  I suspect that her menopausal status along with the need for anticoagulation is likely causing bleeding from the vagina and cervix that she is seeing.  The amount of bleeding she is seen is also compatible with that etiology.  I think unless there is a significant increase in menstrual bleeding in regards to " flow or persistence of the bleeding further evaluation is not warranted.  I have informed the patient if she does notice any increase that she should follow-up with me.           Any available photos and/or pathology were reviewed.  All questions answered.   Ok to resume normal activities  Ok to resume intercourse  Return to school/work without limitations    Follow Up:  Return if symptoms worsen or fail to improve.    Dany Bronson MD  06/11/2025

## 2025-08-26 ENCOUNTER — OFFICE VISIT (OUTPATIENT)
Dept: ONCOLOGY | Facility: HOSPITAL | Age: 80
End: 2025-08-26
Payer: MEDICARE

## 2025-08-26 VITALS
OXYGEN SATURATION: 98 % | TEMPERATURE: 98 F | WEIGHT: 206.35 LBS | BODY MASS INDEX: 34.38 KG/M2 | HEIGHT: 65 IN | HEART RATE: 86 BPM | DIASTOLIC BLOOD PRESSURE: 81 MMHG | SYSTOLIC BLOOD PRESSURE: 103 MMHG | RESPIRATION RATE: 18 BRPM

## 2025-08-26 DIAGNOSIS — Z17.0 MALIGNANT NEOPLASM OF LEFT BREAST IN FEMALE, ESTROGEN RECEPTOR POSITIVE, UNSPECIFIED SITE OF BREAST: ICD-10-CM

## 2025-08-26 DIAGNOSIS — C50.912 MALIGNANT NEOPLASM OF LEFT BREAST IN FEMALE, ESTROGEN RECEPTOR POSITIVE, UNSPECIFIED SITE OF BREAST: ICD-10-CM

## 2025-08-26 PROCEDURE — G0463 HOSPITAL OUTPT CLINIC VISIT: HCPCS | Performed by: INTERNAL MEDICINE

## 2025-08-26 RX ORDER — ANASTROZOLE 1 MG/1
1 TABLET ORAL DAILY
Qty: 90 TABLET | Refills: 3 | Status: SHIPPED | OUTPATIENT
Start: 2025-08-26

## 2025-08-26 RX ORDER — METHYLPREDNISOLONE ACETATE 80 MG/ML
INJECTION, SUSPENSION INTRA-ARTICULAR; INTRALESIONAL; INTRAMUSCULAR; SOFT TISSUE
COMMUNITY
Start: 2025-06-30

## (undated) DEVICE — SOL NACL 0.9PCT 1000ML

## (undated) DEVICE — LITHOTOMY-YELLOW FINS: Brand: MEDLINE INDUSTRIES, INC.

## (undated) DEVICE — 1000ML,PRESSURE INFUSER W/STOPCOCK: Brand: MEDLINE

## (undated) DEVICE — SLV SCD KN/LEN ADJ EXPRSS BLENDED MD 1P/U

## (undated) DEVICE — CATH URETH INTRMIT ALLPURP LTX 16F RED

## (undated) DEVICE — GLV SURG BIOGEL LTX PF 7

## (undated) DEVICE — VAGINAL PREP TRAY: Brand: MEDLINE INDUSTRIES, INC.

## (undated) DEVICE — GLOVE,SURG,SENSICARE SLT,LF,PF,7: Brand: MEDLINE